# Patient Record
Sex: FEMALE | Race: WHITE | NOT HISPANIC OR LATINO | Employment: OTHER | URBAN - METROPOLITAN AREA
[De-identification: names, ages, dates, MRNs, and addresses within clinical notes are randomized per-mention and may not be internally consistent; named-entity substitution may affect disease eponyms.]

---

## 2017-02-03 ENCOUNTER — CLINICAL SUPPORT (OUTPATIENT)
Dept: ORTHOPEDIC SURGERY | Facility: CLINIC | Age: 72
End: 2017-02-03

## 2017-02-03 DIAGNOSIS — M25.561 ACUTE PAIN OF RIGHT KNEE: Primary | ICD-10-CM

## 2017-02-03 PROCEDURE — 20610 DRAIN/INJ JOINT/BURSA W/O US: CPT | Performed by: NURSE PRACTITIONER

## 2017-02-03 RX ORDER — BUPIVACAINE HYDROCHLORIDE 5 MG/ML
2 INJECTION, SOLUTION PERINEURAL
Status: COMPLETED | OUTPATIENT
Start: 2017-02-03 | End: 2017-02-03

## 2017-02-03 RX ORDER — METHYLPREDNISOLONE ACETATE 80 MG/ML
80 INJECTION, SUSPENSION INTRA-ARTICULAR; INTRALESIONAL; INTRAMUSCULAR; SOFT TISSUE
Status: COMPLETED | OUTPATIENT
Start: 2017-02-03 | End: 2017-02-03

## 2017-02-03 RX ORDER — LIDOCAINE HYDROCHLORIDE 10 MG/ML
4 INJECTION, SOLUTION INFILTRATION; PERINEURAL
Status: COMPLETED | OUTPATIENT
Start: 2017-02-03 | End: 2017-02-03

## 2017-02-03 RX ADMIN — METHYLPREDNISOLONE ACETATE 80 MG: 80 INJECTION, SUSPENSION INTRA-ARTICULAR; INTRALESIONAL; INTRAMUSCULAR; SOFT TISSUE at 15:34

## 2017-02-03 RX ADMIN — BUPIVACAINE HYDROCHLORIDE 2 ML: 5 INJECTION, SOLUTION PERINEURAL at 15:34

## 2017-02-03 RX ADMIN — LIDOCAINE HYDROCHLORIDE 4 ML: 10 INJECTION, SOLUTION INFILTRATION; PERINEURAL at 15:34

## 2017-02-03 NOTE — PROGRESS NOTES
2/3/2017    Lizzie Bose is here today for worsening knee pain. Pt has undergone injection of the knee in the past with good resolution of symptoms. Pt is requesting a repeat injection.     KNEE Injection Procedure Note:    Large Joint Arthrocentesis  Date/Time: 2/3/2017 3:34 PM  Consent given by: patient  Site marked: site marked  Timeout: Immediately prior to procedure a time out was called to verify the correct patient, procedure, equipment, support staff and site/side marked as required   Supporting Documentation  Indications: pain and joint swelling   Procedure Details  Location: knee - R knee  Preparation: Patient was prepped and draped in the usual sterile fashion  Needle size: 18 G  Approach: anterolateral  Medications administered: 4 mL lidocaine 1 %; 80 mg methylPREDNISolone acetate 80 MG/ML; 2 mL bupivacaine            At the conclusion of the injection I discussed the importance of continued quad strengthening exercises on a daily basis. I will see the patient back if the symptoms should fail to improve or worsen.    Karina Solo, APRN  2/3/2017

## 2017-03-20 ENCOUNTER — OFFICE VISIT (OUTPATIENT)
Dept: PSYCHIATRY | Facility: HOSPITAL | Age: 72
End: 2017-03-20

## 2017-03-20 DIAGNOSIS — F10.21 ALCOHOL DEPENDENCE IN REMISSION (HCC): ICD-10-CM

## 2017-03-20 DIAGNOSIS — F41.1 GENERALIZED ANXIETY DISORDER: Primary | ICD-10-CM

## 2017-03-20 PROCEDURE — 90791 PSYCH DIAGNOSTIC EVALUATION: CPT | Performed by: SOCIAL WORKER

## 2017-03-20 NOTE — PROGRESS NOTES
1:20pm-2:15pm  Initial note:  Client was referred from the [facility] where she has been seeing Modesta Pickard for 7 years.  Modesta will no longer be seeing clients so she referred her here.  She came to the [facility] after realizing that her drinking was out of control.  She has been sober for seven years, has a sponsor, but no longer goes to .  She has been retired for approximately 6 years but states that she has been too busy for meetings-always has other things to do.  Recommended that she consider a weekly meeting.  Client is  with 2 sons, 33 and 30.  She miscarried between the boys.  Always wanted a daughter.  Will see Modesta again later in the week and discuss plan for ongoing therapy.

## 2017-04-06 ENCOUNTER — OFFICE VISIT (OUTPATIENT)
Dept: PSYCHIATRY | Facility: HOSPITAL | Age: 72
End: 2017-04-06

## 2017-04-06 DIAGNOSIS — F10.21 ALCOHOL DEPENDENCE IN REMISSION (HCC): ICD-10-CM

## 2017-04-06 DIAGNOSIS — F41.1 GENERALIZED ANXIETY DISORDER: Primary | ICD-10-CM

## 2017-04-06 PROCEDURE — 90834 PSYTX W PT 45 MINUTES: CPT | Performed by: SOCIAL WORKER

## 2017-04-06 NOTE — PROGRESS NOTES
"1:00pm-1:50pm  Client states she got lost again coming to the office.  She was very disorganized and anxious.  Toone that she has an appointment with the referring therapist when she leaves here.  She is angry that she was not given more time to process their termination.  She also feels \"cast aside\" that maybe Modesta did not care about her as much as she thought she did.  Thoughts very chaotic.  Not sure she wants to enter this relationship given the circumstances but did make another appointment.  "

## 2017-04-20 ENCOUNTER — OFFICE VISIT (OUTPATIENT)
Dept: PSYCHIATRY | Facility: HOSPITAL | Age: 72
End: 2017-04-20

## 2017-04-20 DIAGNOSIS — F41.1 GENERALIZED ANXIETY DISORDER: Primary | ICD-10-CM

## 2017-04-20 DIAGNOSIS — F10.21 ALCOHOL DEPENDENCE IN REMISSION (HCC): ICD-10-CM

## 2017-04-20 PROCEDURE — 90834 PSYTX W PT 45 MINUTES: CPT | Performed by: SOCIAL WORKER

## 2017-04-20 NOTE — PROGRESS NOTES
1:05pm-1:55pm  Client still struggling with the loss of her therapist but will continue to see her for another month.  While on the trip out to see her son she noticed problems with her -cognitive impairment that scared her.  He had surgery in January, did have problems post anesthesia but she did not expect this.  He was better by the time they left.  Client feels pressure to do more than her energy level allows and is not giving self a break to do some of the things she would like to do-like spending time in the garden.

## 2017-05-04 ENCOUNTER — OFFICE VISIT (OUTPATIENT)
Dept: PSYCHIATRY | Facility: HOSPITAL | Age: 72
End: 2017-05-04

## 2017-05-04 DIAGNOSIS — F10.21 ALCOHOL DEPENDENCE IN REMISSION (HCC): ICD-10-CM

## 2017-05-04 DIAGNOSIS — F41.1 GENERALIZED ANXIETY DISORDER: Primary | ICD-10-CM

## 2017-05-04 PROCEDURE — 90834 PSYTX W PT 45 MINUTES: CPT | Performed by: SOCIAL WORKER

## 2017-05-18 ENCOUNTER — OFFICE VISIT (OUTPATIENT)
Dept: PSYCHIATRY | Facility: HOSPITAL | Age: 72
End: 2017-05-18

## 2017-05-18 DIAGNOSIS — F10.21 ALCOHOL DEPENDENCE IN REMISSION (HCC): ICD-10-CM

## 2017-05-18 DIAGNOSIS — F41.1 GENERALIZED ANXIETY DISORDER: Primary | ICD-10-CM

## 2017-05-18 PROCEDURE — 90834 PSYTX W PT 45 MINUTES: CPT | Performed by: SOCIAL WORKER

## 2017-06-02 ENCOUNTER — APPOINTMENT (OUTPATIENT)
Dept: PSYCHIATRY | Facility: HOSPITAL | Age: 72
End: 2017-06-02

## 2017-06-09 ENCOUNTER — OFFICE VISIT (OUTPATIENT)
Dept: PSYCHIATRY | Facility: HOSPITAL | Age: 72
End: 2017-06-09

## 2017-06-09 DIAGNOSIS — F10.21 ALCOHOL DEPENDENCE IN REMISSION (HCC): ICD-10-CM

## 2017-06-09 DIAGNOSIS — F41.1 GENERALIZED ANXIETY DISORDER: Primary | ICD-10-CM

## 2017-06-09 PROCEDURE — 90834 PSYTX W PT 45 MINUTES: CPT | Performed by: SOCIAL WORKER

## 2017-06-09 NOTE — PROGRESS NOTES
1:00pm-1:50pm  Client has continued to see her therapist at the [facility] but will wrap up in July.  She has had two sessions with her  and that has been helpful.  The next session will be individual.  Encouraged her to use that time to process her grief.

## 2020-01-16 ENCOUNTER — OFFICE VISIT (OUTPATIENT)
Dept: ORTHOPEDIC SURGERY | Facility: CLINIC | Age: 75
End: 2020-01-16

## 2020-01-16 VITALS — TEMPERATURE: 98.9 F | WEIGHT: 149.8 LBS | BODY MASS INDEX: 24.96 KG/M2 | HEIGHT: 65 IN

## 2020-01-16 DIAGNOSIS — M25.562 CHRONIC PAIN OF BOTH KNEES: Primary | ICD-10-CM

## 2020-01-16 DIAGNOSIS — G89.29 CHRONIC PAIN OF BOTH KNEES: Primary | ICD-10-CM

## 2020-01-16 DIAGNOSIS — M25.561 CHRONIC PAIN OF BOTH KNEES: Primary | ICD-10-CM

## 2020-01-16 PROBLEM — M54.50 LUMBAR PAIN: Status: ACTIVE | Noted: 2019-03-21

## 2020-01-16 PROCEDURE — 99214 OFFICE O/P EST MOD 30 MIN: CPT | Performed by: ORTHOPAEDIC SURGERY

## 2020-01-16 PROCEDURE — 73562 X-RAY EXAM OF KNEE 3: CPT | Performed by: ORTHOPAEDIC SURGERY

## 2020-01-16 RX ORDER — MELATONIN
1000 DAILY
COMMUNITY

## 2020-01-16 NOTE — PROGRESS NOTES
"Patient: Lizzie Bose  YOB: 1945 74 y.o. female  Medical Record Number: [id number]    Chief Complaint:   Chief Complaint   Patient presents with   • Left Knee - Establish Care   • Right Knee - Establish Care       History of Present Illness:Lizzie Bose is a 74 y.o. female who presents for follow-up of  RIGHT > LEFT KNEE PAIN -  SEVERE MEDIAL STABBING WORSE WITH wb -  LIMTS ADLS AND QUALITY OF LIFE - LAST INNJECTION NOT HELPFUL. hAS MODERATE ACHING OF MIDLINE LUMBAR.    Allergies:   Allergies   Allergen Reactions   • Atorvastatin        Medications:   Current Outpatient Medications   Medication Sig Dispense Refill   • amLODIPine-valsartan (EXFORGE) 5-160 MG per tablet Take 1 tablet by mouth.     • amphetamine-dextroamphetamine (ADDERALL) 10 MG tablet TAKE 1 TABLET BY MOUTH EVERY DAY IN THE AFTERNOON  0   • CALCIUM CARBONATE-VITAMIN D PO Take  by mouth.     • cholecalciferol (VITAMIN D3) 25 MCG (1000 UT) tablet Take 1,000 Units by mouth Daily.     • ezetimibe (ZETIA) 10 MG tablet Take  by mouth.     • metoprolol succinate XL (TOPROL-XL) 100 MG 24 hr tablet Take 100 mg by mouth.     • MULTIPLE VITAMIN PO Take  by mouth.     • PRISTIQ 50 MG 24 hr tablet      • OMEGA-3 FATTY ACIDS PO Take  by mouth.     • VYVANSE 40 MG capsule TAKE ONE CAPSULE BY MOUTH EVERY DAY IN THE MORNING  0     No current facility-administered medications for this visit.          The following portions of the patient's history were reviewed and updated as appropriate: allergies, current medications, past family history, past medical history, past social history, past surgical history and problem list.    Review of Systems:   A 14 point review of systems was performed. All systems negative except pertinent positives/negative listed in HPI above    Physical Exam:   Vitals:    01/16/20 1502   Temp: 98.9 °F (37.2 °C)   TempSrc: Temporal   Weight: 67.9 kg (149 lb 12.8 oz)   Height: 165.1 cm (65\")   PainSc:   7   PainLoc: Knee "       General: A and O x 3, ASA, NAD    SCLERA:    Normal    DENTITION:   Normal  Knee:  bilateral    ALIGNMENT:     Varus  ,   Patella  tracks  midline    GAIT:    Antalgic    SKIN:    No abnormality    RANGE OF MOTION:   4  -  110   DEG    STRENGTH:   4  / 5    LIGAMENTS:    No varus / valgus instability.   Negative  Lachman.    MENISCUS:     Negative   Jani       DISTAL PULSES:    Paplable    DISTAL SENSATION :   Intact    LYMPHATICS:     No   lymphadenopathy    OTHER:          - Positive   effusion      - Crepitance with ROM       Radiology:   Xrays: 3 views bilateral knee (AP, lateral, and sunrise) were ordered and reviewed for evaluation of knee pain demonstrating - advanced OA with bone on bone articulation, subchondral cysts, and periarticular osteophytes. In comparison to previous films there has been progression of the arthritic process  Comparison views: todays xrays were compared to previous xrays and show new findings as described above    Assessment/Plan:  r > l KNEE PAIN - SEVERE END STAGE oa.   Continuation of conservative management vs. TKA discussed.  The patient wishes to proceed with total knee replacement.  At this point the patient has failed the full compliment of conservative treatment and stating complete understanding of the risks/benefits/ anternatives wishes to proceed with surgical treatment.    Risk and benefits of surgery were reviewed.  Including, but not limited to, blood clots or pulmonary embolism, anesthesia risk, infection, fracture, skin/leg numbness, persistent pain/crepitance/popping/catching, failure of the implant, need for future surgeries, hematoma, possible nerve or blood vessel injury, need for transfusion, and potential risk of stroke,heart attack or death, among others.  The patient understands and wishes to proceed.     It was explained that if tissue has been repaired or reconstructed, there is also an increased chance of failure which may require further  management.  Following the completion of the discussion, the patient expressed understanding of this planned course of care, all their questions were answered and consent will be obtained preoperatively.    Operative Plan: RIGHT Smith and Nephjuan Oxinium Total Knee Replacement an overnight staywith home health rehab.  She has some back issues as well has seen Dr. Mandel who has previously replaced her hip.  He feels that she has sciatica and I think her symptoms are consistent with that.  I recommended that she see physical therapy for abdominal core strengthening and also work on her legs as well.  She has some issues to work through with her  and decisions to make when she is ready the next step will be for her to call us back and we can get her set up for right total knee replacement.      Krish Ramirez MD  1/16/2020   No

## 2020-01-27 ENCOUNTER — TELEPHONE (OUTPATIENT)
Dept: ORTHOPEDIC SURGERY | Facility: CLINIC | Age: 75
End: 2020-01-27

## 2020-02-10 ENCOUNTER — TELEPHONE (OUTPATIENT)
Dept: ORTHOPEDIC SURGERY | Facility: CLINIC | Age: 75
End: 2020-02-10

## 2020-02-11 DIAGNOSIS — M17.11 PRIMARY OSTEOARTHRITIS OF RIGHT KNEE: Primary | ICD-10-CM

## 2020-02-11 PROBLEM — M17.9 OA (OSTEOARTHRITIS) OF KNEE: Status: ACTIVE | Noted: 2020-02-11

## 2020-02-11 RX ORDER — MELOXICAM 15 MG/1
15 TABLET ORAL ONCE
Status: CANCELLED | OUTPATIENT
Start: 2020-05-06 | End: 2020-02-11

## 2020-02-11 RX ORDER — VANCOMYCIN HYDROCHLORIDE 1 G/200ML
15 INJECTION, SOLUTION INTRAVENOUS ONCE
Status: CANCELLED | OUTPATIENT
Start: 2020-05-06 | End: 2020-02-11

## 2020-02-11 RX ORDER — CEFAZOLIN SODIUM 2 G/100ML
2 INJECTION, SOLUTION INTRAVENOUS ONCE
Status: CANCELLED | OUTPATIENT
Start: 2020-05-06 | End: 2020-02-11

## 2020-02-11 RX ORDER — PREGABALIN 75 MG/1
150 CAPSULE ORAL ONCE
Status: CANCELLED | OUTPATIENT
Start: 2020-05-06 | End: 2020-02-11

## 2020-02-19 ENCOUNTER — CONSULT (OUTPATIENT)
Dept: ORTHOPEDIC SURGERY | Facility: CLINIC | Age: 75
End: 2020-02-19

## 2020-02-19 VITALS — WEIGHT: 145 LBS | BODY MASS INDEX: 24.16 KG/M2 | HEIGHT: 65 IN | TEMPERATURE: 97.2 F

## 2020-02-19 DIAGNOSIS — M54.50 LOW BACK PAIN, UNSPECIFIED BACK PAIN LATERALITY, UNSPECIFIED CHRONICITY, UNSPECIFIED WHETHER SCIATICA PRESENT: Primary | ICD-10-CM

## 2020-02-19 PROCEDURE — 72100 X-RAY EXAM L-S SPINE 2/3 VWS: CPT | Performed by: ORTHOPAEDIC SURGERY

## 2020-02-19 PROCEDURE — 99214 OFFICE O/P EST MOD 30 MIN: CPT | Performed by: ORTHOPAEDIC SURGERY

## 2020-02-19 RX ORDER — ZOLPIDEM TARTRATE 10 MG/1
10 TABLET ORAL NIGHTLY PRN
COMMUNITY
Start: 2020-01-23 | End: 2020-09-03 | Stop reason: SDUPTHER

## 2020-02-19 RX ORDER — MELOXICAM 15 MG/1
15 TABLET ORAL DAILY
COMMUNITY
Start: 2020-02-09

## 2020-02-19 NOTE — PROGRESS NOTES
New patient or new problem visit    Chief Complaint   Patient presents with   • Lumbar Spine - Pain       HPI: Low back pain left buttock pain, actually bilateral buttock pain worse with activity moderate to severe intermittent stabbing and aching.    PFSH: See chart- reviewed    Review of Systems   Constitutional: Negative for chills, fever and unexpected weight change.   HENT: Negative for trouble swallowing and voice change.    Eyes: Negative for visual disturbance.   Respiratory: Positive for apnea. Negative for cough and shortness of breath.    Cardiovascular: Negative for chest pain and leg swelling.   Gastrointestinal: Negative for abdominal pain, nausea and vomiting.   Endocrine: Negative for cold intolerance and heat intolerance.   Genitourinary: Negative for difficulty urinating, frequency and urgency.   Musculoskeletal: Positive for arthralgias and back pain.   Skin: Negative for rash and wound.   Allergic/Immunologic: Negative for immunocompromised state.   Neurological: Negative for weakness and numbness.   Hematological: Does not bruise/bleed easily.   Psychiatric/Behavioral: Negative for dysphoric mood. The patient is not nervous/anxious.        PE: Constitutional: Vital signs above-noted.  Awake, alert and oriented    Psychiatric: Affect and insight do not appear grossly disturbed.    Pulmonary: Breathing is unlabored, color is good.    Skin: Warm, dry and normal turgor    Cardiac: Pedal pulses intact.  No edema.    Eyesight and hearing appear adequate for examination purposes      Musculoskeletal:  There is no tenderness to percussion and palpation of the spine. Motion appears undisturbed.  Posture is unremarkable to coronal and sagittal inspection.    The skin about the area is intact.  There is no palpable or visible deformity.  There is no local spasm.       Neurologic:   Reflexes are 2+ and symmetrical in the patellae and achilles.   Motor function is undisturbed in quadriceps, EHL, and  gastrocnemius   sensation appears symmetrically intact to light touch.  In the bilateral lower extremities there is no evidence of atrophy.   Clonus is absent..  Gait appears undisturbed.  SLR test negative      MEDICAL DECISION MAKING    XRAY: Plain film x-rays of the lumbar spine show multilevel spondylosis and some L4-5 spondylolisthesis of mild extent.    Other: n/a    Impression: Lumbar spondylolisthesis lumbar spinal stenosis.  She does not really want to do therapy.    Plan: MRI scan perhaps with an eye toward epidural injection.  I will call her with results and recommendations.

## 2020-02-24 PROBLEM — M17.11 PRIMARY OSTEOARTHRITIS OF RIGHT KNEE: Status: ACTIVE | Noted: 2020-02-24

## 2020-02-28 ENCOUNTER — APPOINTMENT (OUTPATIENT)
Dept: MRI IMAGING | Facility: HOSPITAL | Age: 75
End: 2020-02-28

## 2020-03-08 ENCOUNTER — APPOINTMENT (OUTPATIENT)
Dept: MRI IMAGING | Facility: HOSPITAL | Age: 75
End: 2020-03-08

## 2020-03-12 ENCOUNTER — HOSPITAL ENCOUNTER (OUTPATIENT)
Dept: MRI IMAGING | Facility: HOSPITAL | Age: 75
Discharge: HOME | End: 2020-03-12
Admitting: ORTHOPAEDIC SURGERY

## 2020-03-12 DIAGNOSIS — M54.50 LOW BACK PAIN, UNSPECIFIED BACK PAIN LATERALITY, UNSPECIFIED CHRONICITY, UNSPECIFIED WHETHER SCIATICA PRESENT: ICD-10-CM

## 2020-03-12 PROCEDURE — 72148 MRI LUMBAR SPINE W/O DYE: CPT

## 2020-03-20 ENCOUNTER — TELEPHONE (OUTPATIENT)
Dept: ORTHOPEDIC SURGERY | Facility: CLINIC | Age: 75
End: 2020-03-20

## 2020-03-20 DIAGNOSIS — M54.50 LOW BACK PAIN, UNSPECIFIED BACK PAIN LATERALITY, UNSPECIFIED CHRONICITY, UNSPECIFIED WHETHER SCIATICA PRESENT: Primary | ICD-10-CM

## 2020-03-20 NOTE — TELEPHONE ENCOUNTER
----- Message from aMrio Silverio MD sent at 3/19/2020 11:34 AM EDT -----  Tell her that the lumbar MRI demonstrated widespread wear-and-tear change but very little in the way of pinched nerves except at the L4-5 level.  I recommend lumbar epidural injections, or simply living with this.  Surgery may someday be an option but definitely not at the present time.

## 2020-03-25 NOTE — TELEPHONE ENCOUNTER
Patient informed of results.  Epidurals have been ordered, however, she is aware they are currently not being performed but that someone from the hospital will call her.  She also mentioned that she is scheduled for have a TKR with [facility] in May.  I have instructed her to call when she finds out about her epidurals to make sure it's not too close to her surgery date.  I have mailed her a copy of the MRI report.

## 2020-04-23 ENCOUNTER — APPOINTMENT (OUTPATIENT)
Dept: PREADMISSION TESTING | Facility: HOSPITAL | Age: 75
End: 2020-04-23

## 2020-04-24 ENCOUNTER — APPOINTMENT (OUTPATIENT)
Dept: PREADMISSION TESTING | Facility: HOSPITAL | Age: 75
End: 2020-04-24

## 2020-04-29 ENCOUNTER — APPOINTMENT (OUTPATIENT)
Dept: PREADMISSION TESTING | Facility: HOSPITAL | Age: 75
End: 2020-04-29

## 2020-05-13 ENCOUNTER — PREP FOR SURGERY (OUTPATIENT)
Dept: OTHER | Facility: HOSPITAL | Age: 75
End: 2020-05-13

## 2020-05-29 ENCOUNTER — ANESTHESIA EVENT (OUTPATIENT)
Dept: PAIN MEDICINE | Facility: HOSPITAL | Age: 75
End: 2020-05-29

## 2020-05-29 ENCOUNTER — HOSPITAL ENCOUNTER (OUTPATIENT)
Dept: GENERAL RADIOLOGY | Facility: HOSPITAL | Age: 75
Discharge: HOME | End: 2020-05-29

## 2020-05-29 ENCOUNTER — HOSPITAL ENCOUNTER (OUTPATIENT)
Dept: PAIN MEDICINE | Facility: HOSPITAL | Age: 75
Discharge: HOME | End: 2020-05-29
Admitting: ANESTHESIOLOGY

## 2020-05-29 ENCOUNTER — ANESTHESIA (OUTPATIENT)
Dept: PAIN MEDICINE | Facility: HOSPITAL | Age: 75
End: 2020-05-29

## 2020-05-29 VITALS
HEIGHT: 65 IN | HEART RATE: 72 BPM | SYSTOLIC BLOOD PRESSURE: 139 MMHG | RESPIRATION RATE: 14 BRPM | OXYGEN SATURATION: 96 % | BODY MASS INDEX: 23.32 KG/M2 | TEMPERATURE: 98 F | DIASTOLIC BLOOD PRESSURE: 73 MMHG | WEIGHT: 140 LBS

## 2020-05-29 DIAGNOSIS — M54.50 LOW BACK PAIN, UNSPECIFIED BACK PAIN LATERALITY, UNSPECIFIED CHRONICITY, UNSPECIFIED WHETHER SCIATICA PRESENT: ICD-10-CM

## 2020-05-29 DIAGNOSIS — M54.50 LUMBAR PAIN: Primary | ICD-10-CM

## 2020-05-29 DIAGNOSIS — R52 PAIN: ICD-10-CM

## 2020-05-29 PROCEDURE — C1755 CATHETER, INTRASPINAL: HCPCS

## 2020-05-29 PROCEDURE — 25010000002 METHYLPREDNISOLONE PER 80 MG: Performed by: ANESTHESIOLOGY

## 2020-05-29 PROCEDURE — 77003 FLUOROGUIDE FOR SPINE INJECT: CPT

## 2020-05-29 PROCEDURE — 0 IOPAMIDOL 41 % SOLUTION: Performed by: ANESTHESIOLOGY

## 2020-05-29 RX ORDER — LIDOCAINE HYDROCHLORIDE 10 MG/ML
1 INJECTION, SOLUTION INFILTRATION; PERINEURAL ONCE AS NEEDED
Status: DISCONTINUED | OUTPATIENT
Start: 2020-05-29 | End: 2020-05-30 | Stop reason: HOSPADM

## 2020-05-29 RX ORDER — FENTANYL CITRATE 50 UG/ML
50 INJECTION, SOLUTION INTRAMUSCULAR; INTRAVENOUS AS NEEDED
Status: DISCONTINUED | OUTPATIENT
Start: 2020-05-29 | End: 2020-05-30 | Stop reason: HOSPADM

## 2020-05-29 RX ORDER — MIDAZOLAM HYDROCHLORIDE 1 MG/ML
1 INJECTION INTRAMUSCULAR; INTRAVENOUS AS NEEDED
Status: DISCONTINUED | OUTPATIENT
Start: 2020-05-29 | End: 2020-05-30 | Stop reason: HOSPADM

## 2020-05-29 RX ORDER — SODIUM CHLORIDE 0.9 % (FLUSH) 0.9 %
1-10 SYRINGE (ML) INJECTION AS NEEDED
Status: DISCONTINUED | OUTPATIENT
Start: 2020-05-29 | End: 2020-05-30 | Stop reason: HOSPADM

## 2020-05-29 RX ORDER — METHYLPREDNISOLONE ACETATE 80 MG/ML
80 INJECTION, SUSPENSION INTRA-ARTICULAR; INTRALESIONAL; INTRAMUSCULAR; SOFT TISSUE ONCE
Status: COMPLETED | OUTPATIENT
Start: 2020-05-29 | End: 2020-05-29

## 2020-05-29 RX ADMIN — IOPAMIDOL 10 ML: 408 INJECTION, SOLUTION INTRATHECAL at 12:04

## 2020-05-29 RX ADMIN — METHYLPREDNISOLONE ACETATE 80 MG: 80 INJECTION, SUSPENSION INTRA-ARTICULAR; INTRALESIONAL; INTRAMUSCULAR; SOFT TISSUE at 12:04

## 2020-05-29 NOTE — ANESTHESIA PROCEDURE NOTES
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)     * Lumbago (M54.5)     * Degeneration of lumbar intervertebral disc (M51.36)    Prep:  Pt Position:prone  Sterile Tech:sterile barrier, mask and gloves  Prep:chlorhexidine gluconate and isopropyl alcohol  Monitoring:blood pressure monitoring, continuous pulse oximetry and EKG  Procedure:Sedation: no     Approach:midline  Guidance: fluoroscopy  Location:lumbar  Level:L5-S1  Needle Type:Tuohy  Needle Gauge:20 G  Aspiration:negative  Test Dose:negative  Medications:  Depomedrol:80 mg  Preservative Free Saline:2mL  Isovue:2mL    Post Assessment:  Pt Tolerance:patient tolerated the procedure well with no apparent complications  Complications:no

## 2020-05-29 NOTE — H&P
[facility]    History and Physical    Patient Name: Lizzie Bose  :  1945  MRN:  [id number]  Date of Admission: 2020    Subjective     Patient is a 75 y.o. female presents with chief complaint of chronic low back, hips: bilateral and buttock pain.  Onset of symptoms was gradual starting several months ago.  Symptoms are associated/aggravated by nothing in particular. Symptoms improve with nothing    The following portions of the patients history were reviewed and updated as appropriate: current medications, allergies, past medical history, past surgical history, past family history, past social history and problem list    MRI of the spine shows multiple levels of degeneration            Objective     Past Medical History:   Past Medical History:   Diagnosis Date   • High blood pressure    • Hyperlipidemia    • Low back pain      Past Surgical History:   Past Surgical History:   Procedure Laterality Date   • APPENDECTOMY     • CATARACT EXTRACTION Bilateral    • HIP SURGERY Left     total hip replacement   • JOINT REPLACEMENT      tlkr     Family History:   Family History   Problem Relation Age of Onset   • Diabetes Brother    • Hypertension Brother    • Clotting disorder Son      Social History:   Social History     Tobacco Use   • Smoking status: Never Smoker   • Smokeless tobacco: Never Used   Substance Use Topics   • Alcohol use: No   • Drug use: Never       Vital Signs Range for the last 24 hours  Temperature:     Temp Source:     BP:     Pulse:     Respirations:     SPO2:     O2 Amount (l/min):     O2 Devices     Weight:           --------------------------------------------------------------------------------    Current Outpatient Medications   Medication Sig Dispense Refill   • amLODIPine-valsartan (EXFORGE) 5-160 MG per tablet Take 1 tablet by mouth.     • amphetamine-dextroamphetamine (ADDERALL) 10 MG tablet TAKE 1 TABLET BY MOUTH EVERY DAY IN THE AFTERNOON  0   • CALCIUM CARBONATE-VITAMIN D  PO Take  by mouth.     • cholecalciferol (VITAMIN D3) 25 MCG (1000 UT) tablet Take 1,000 Units by mouth Daily.     • ezetimibe (ZETIA) 10 MG tablet Take  by mouth.     • meloxicam (MOBIC) 15 MG tablet Take 15 mg by mouth Daily. PRN     • metoprolol succinate XL (TOPROL-XL) 100 MG 24 hr tablet Take 100 mg by mouth.     • MULTIPLE VITAMIN PO Take  by mouth.     • OMEGA-3 FATTY ACIDS PO Take  by mouth.     • PRISTIQ 50 MG 24 hr tablet      • VYVANSE 40 MG capsule TAKE ONE CAPSULE BY MOUTH EVERY DAY IN THE MORNING  0   • zolpidem (AMBIEN) 10 MG tablet        Current Facility-Administered Medications   Medication Dose Route Frequency Provider Last Rate Last Dose   • fentaNYL citrate (PF) (SUBLIMAZE) injection 50 mcg  50 mcg Intravenous PRN Render, Emmett Donis MD       • iopamidol (ISOVUE-M 200) injection 41%  12 mL Epidural Once in imaging Render, Emmett Donis MD       • lidocaine (XYLOCAINE) 1 % injection 1 mL  1 mL Intradermal Once PRN RenderEmmett MD       • methylPREDNISolone acetate (DEPO-medrol) injection 80 mg  80 mg Intra-articular Once Render, Emmett Donis MD       • midazolam (VERSED) injection 1 mg  1 mg Intravenous PRN Render, Emmett Donis MD       • sodium chloride 0.9 % flush 1-10 mL  1-10 mL Intravenous PRN Emmett Muse MD           --------------------------------------------------------------------------------  Assessment/Plan      Anesthesia Evaluation     NPO Solid Status: N/A             Airway   Mallampati: II  TM distance: >3 FB  Neck ROM: full  No difficulty expected  Dental - normal exam     Pulmonary - normal exam   (+) sleep apnea,   (-) not a smoker  Cardiovascular     Rhythm: regular    (+) hypertension, hyperlipidemia,     PE comment: Dorsal pedal pulses are palpable    Neuro/Psych  (-) left straight leg raise test, right straight leg raise test  GI/Hepatic/Renal/Endo      Musculoskeletal         PE comment: Hx of DIONICIO and planned TKA- some arthritic pain but otherwise good ROM  Abdominal     Substance History      OB/GYN          Other                   Diagnosis and Plan    Treatment Plan  ASA 3      Procedures: Lumbar Epidural Steroid Injection(LESI), With fluoroscopy,           Plan lumbar epidural steroid injection under fluoroscopic guidance.  Patient understands the risks and benefits including but not limited to bleeding, infection, dural puncture headache, inadvertent spinal anesthetic and allergic adverse reaction to local anesthetic or steroid.  She is also aware that long-term steroid administration can potentially lead to increased risk of lumbar compression fractures or avascular necrosis of the hips.    Diagnosis     * Lumbar neuritis [M54.16]     * Lumbago [M54.5]     * Degeneration of lumbar intervertebral disc [M51.36]

## 2020-07-01 NOTE — PROGRESS NOTES
Pre Op Ortho Assessment    [facility]     Patient Name: Lizzie Bose  MRN: [id number]  Today's Date: 7/1/2020        PRE-OPERATIVE ORTHOPEDIC ASSESSMENT     Row Name 07/01/20 1543       Question    What is your age group?  0    Gender?  1    How far on average can you walk? (a block is 200 meters)  1    Which gait aid do you use? (more often than not)  2    Do you use community supports: (home help, meals on wheels, district nursing)  1    Will you live with someone who can care for you after your operation?  0    Your Score (out of 12)  5       Discharge Disposition/Planning:    Discussed the predicted discharge disposition needed based on RAPT Assessment with the patient  Yes    Patient [facility]  HER HOME HAS NO STEPS INTO OR INSIDE    Outpatient Rehabilitation Facility of Choice:  other *see comment NO CHOICE YET    Home Health Services Preferred:  Other *see comment NO CHOICE YET    Post-operative Caregiver Name and Phone Number  Friend FRIENDS CAN HELP ONCE DC'D FROM REHAB .  HER HUSBANDHAS BEEN ILL AND IS IN [facility].        Subacute Inpatient Rehabilitation: Complete this section only if planning inpatient services at a sub-acute facility.     Subacute Facility Preferred  -- [facility]    Requires pre-certification for inpatient rehabilitation services?  Yes    Planned source of transportation to inpatient rehabilitation facility?  Other *see comment POSSIBLY A FRIEND.  SHE IS WORKING ON THAT    If choosing inpatient services at an Acute or Subacute Facility please list a subsequent back-up plan (in case the patient fails to qualify for inpatient rehabilitation).  Other *see comments NO BACK UP.   IS ILL AND SON LIVES OUT [facility].        Home Equipment    Does patient have a walker for home use?  Yes    Does patient have a 3 in 1 commode for home use?  Yes    Does patient have a shower chair for home use?  No    Does patient have an  elevated commode seat for home use?  No    Does patient have a cane for home use?  Yes    Is there any other medical equipment in the home?  No       Pre-Operative Class Attendance    Attended or scheduled to attend the pre-operative class within 1 year of total joint replacement?  Yes       Patient Education    Expected time of discharge discussed  Yes    Encouraged to attend pre-operative class  Yes    Education regarding predicted discharge disposition based on RAPT score  Yes    Patient receptive and voiced understanding of information given  Yes            Devon Palencia LPN

## 2020-07-09 ENCOUNTER — OFFICE VISIT (OUTPATIENT)
Dept: ORTHOPEDIC SURGERY | Facility: CLINIC | Age: 75
End: 2020-07-09

## 2020-07-09 VITALS
HEIGHT: 65 IN | DIASTOLIC BLOOD PRESSURE: 84 MMHG | WEIGHT: 146 LBS | TEMPERATURE: 98.7 F | BODY MASS INDEX: 24.32 KG/M2 | SYSTOLIC BLOOD PRESSURE: 132 MMHG

## 2020-07-09 DIAGNOSIS — M17.11 PRIMARY OSTEOARTHRITIS OF RIGHT KNEE: Primary | ICD-10-CM

## 2020-07-09 PROCEDURE — 77077 JOINT SURVEY SINGLE VIEW: CPT | Performed by: ORTHOPAEDIC SURGERY

## 2020-07-09 PROCEDURE — 73562 X-RAY EXAM OF KNEE 3: CPT | Performed by: NURSE PRACTITIONER

## 2020-07-09 PROCEDURE — S0260 H&P FOR SURGERY: HCPCS | Performed by: NURSE PRACTITIONER

## 2020-07-09 NOTE — H&P
Patient: Lizzie Bose    Date of Admission: 7/13/2020    YOB: 1945    Medical Record Number: [id number]    Admitting Physician: Dr. Krish Ramirez    Reason for Admission: End Stage Right Knee OA    History of Present Illness: 75 y.o. female presents with severe end stage knee osteoarthritis which has not been responsive to the full compliment of conservative measures. Despite conservative attempts, there is still severe, constant activity limiting pain. Given the severity of the pain, the patient has elected to proceed with knee replacement.    Allergies:   Allergies   Allergen Reactions   • Atorvastatin          Current Medications:  Home Medications:    Current Outpatient Medications on File Prior to Visit   Medication Sig   • amLODIPine-valsartan (EXFORGE) 5-160 MG per tablet Take 1 tablet by mouth.   • amphetamine-dextroamphetamine (ADDERALL) 10 MG tablet TAKE 1 TABLET BY MOUTH EVERY DAY IN THE AFTERNOON   • CALCIUM CARBONATE-VITAMIN D PO Take  by mouth.   • cholecalciferol (VITAMIN D3) 25 MCG (1000 UT) tablet Take 1,000 Units by mouth Daily.   • ezetimibe (ZETIA) 10 MG tablet Take  by mouth.   • meloxicam (MOBIC) 15 MG tablet Take 15 mg by mouth Daily. PRN   • metoprolol succinate XL (TOPROL-XL) 100 MG 24 hr tablet Take 100 mg by mouth.   • MULTIPLE VITAMIN PO Take  by mouth.   • OMEGA-3 FATTY ACIDS PO Take  by mouth.   • PRISTIQ 50 MG 24 hr tablet    • VYVANSE 40 MG capsule TAKE ONE CAPSULE BY MOUTH EVERY DAY IN THE MORNING   • zolpidem (AMBIEN) 10 MG tablet      No current facility-administered medications on file prior to visit.      PRN Meds:.    PMH:     Past Medical History:   Diagnosis Date   • High blood pressure    • Hyperlipidemia    • Low back pain        PF/Surg/Soc Hx:     Past Surgical History:   Procedure Laterality Date   • APPENDECTOMY     • CATARACT EXTRACTION Bilateral    • HIP SURGERY Left     total hip replacement   • JOINT REPLACEMENT      tlkr        Social History  "    Occupational History   • Not on file   Tobacco Use   • Smoking status: Never Smoker   • Smokeless tobacco: Never Used   Substance and Sexual Activity   • Alcohol use: No   • Drug use: Never   • Sexual activity: Not on file      Social History     Social History Narrative   • Not on file        Family History   Problem Relation Age of Onset   • Diabetes Brother    • Hypertension Brother    • Clotting disorder Son          Review of Systems:   A 14 point review of systems was performed, pertinent positives discussed above, all other systems are negative    Physical Exam: 75 y.o. female  Vital Signs :   Vitals:    07/09/20 1455   BP: 132/84   Temp: 98.7 °F (37.1 °C)   Weight: 66.2 kg (146 lb)   Height: 165.1 cm (65\")   PainSc:   5     General: Alert and Oriented x 3, No acute distress.  Psych: mood and affect appropriate; recent and remote memory intact  Eyes: conjunctiva clear; pupils equally round and reactive, sclera nonicteric  CV: no peripheral edema  Resp: normal respiratory effort  Skin: no rashes or wounds; normal turgor  Musculosketetal; pain and crepitance with knee range of motion  Vascular: palpable distal pulses    Xrays:  -3 views (AP, lateral, and sunrise) were ordered and reviewed demonstrating end-stage OA with bone on bone articulation.  -A full length AP xray was ordered and reviewed today for purposes of operative alignment demonstrating end stage arthritic findings. There are no previous full length films for review    Assessment:  End-stage Right knee osteoarthritis. Conservative measures have failed.      Plan:  The plan is to proceed with Right Total Knee Replacement. The patient voiced understanding of the risks, benefits, and alternative forms of treatment that were discussed with Dr Ramirez at the time of scheduling. 23     Karina Solo, APRN  7/9/2020        "

## 2020-07-09 NOTE — H&P (VIEW-ONLY)
Patient: Lizzie Bose    Date of Admission: 7/13/2020    YOB: 1945    Medical Record Number: [id number]    Admitting Physician: Dr. Krish Ramirez    Reason for Admission: End Stage Right Knee OA    History of Present Illness: 75 y.o. female presents with severe end stage knee osteoarthritis which has not been responsive to the full compliment of conservative measures. Despite conservative attempts, there is still severe, constant activity limiting pain. Given the severity of the pain, the patient has elected to proceed with knee replacement.    Allergies:   Allergies   Allergen Reactions   • Atorvastatin          Current Medications:  Home Medications:    Current Outpatient Medications on File Prior to Visit   Medication Sig   • amLODIPine-valsartan (EXFORGE) 5-160 MG per tablet Take 1 tablet by mouth.   • amphetamine-dextroamphetamine (ADDERALL) 10 MG tablet TAKE 1 TABLET BY MOUTH EVERY DAY IN THE AFTERNOON   • CALCIUM CARBONATE-VITAMIN D PO Take  by mouth.   • cholecalciferol (VITAMIN D3) 25 MCG (1000 UT) tablet Take 1,000 Units by mouth Daily.   • ezetimibe (ZETIA) 10 MG tablet Take  by mouth.   • meloxicam (MOBIC) 15 MG tablet Take 15 mg by mouth Daily. PRN   • metoprolol succinate XL (TOPROL-XL) 100 MG 24 hr tablet Take 100 mg by mouth.   • MULTIPLE VITAMIN PO Take  by mouth.   • OMEGA-3 FATTY ACIDS PO Take  by mouth.   • PRISTIQ 50 MG 24 hr tablet    • VYVANSE 40 MG capsule TAKE ONE CAPSULE BY MOUTH EVERY DAY IN THE MORNING   • zolpidem (AMBIEN) 10 MG tablet      No current facility-administered medications on file prior to visit.      PRN Meds:.    PMH:     Past Medical History:   Diagnosis Date   • High blood pressure    • Hyperlipidemia    • Low back pain        PF/Surg/Soc Hx:     Past Surgical History:   Procedure Laterality Date   • APPENDECTOMY     • CATARACT EXTRACTION Bilateral    • HIP SURGERY Left     total hip replacement   • JOINT REPLACEMENT      tlkr        Social History  "    Occupational History   • Not on file   Tobacco Use   • Smoking status: Never Smoker   • Smokeless tobacco: Never Used   Substance and Sexual Activity   • Alcohol use: No   • Drug use: Never   • Sexual activity: Not on file      Social History     Social History Narrative   • Not on file        Family History   Problem Relation Age of Onset   • Diabetes Brother    • Hypertension Brother    • Clotting disorder Son          Review of Systems:   A 14 point review of systems was performed, pertinent positives discussed above, all other systems are negative    Physical Exam: 75 y.o. female  Vital Signs :   Vitals:    07/09/20 1455   BP: 132/84   Temp: 98.7 °F (37.1 °C)   Weight: 66.2 kg (146 lb)   Height: 165.1 cm (65\")   PainSc:   5     General: Alert and Oriented x 3, No acute distress.  Psych: mood and affect appropriate; recent and remote memory intact  Eyes: conjunctiva clear; pupils equally round and reactive, sclera nonicteric  CV: no peripheral edema  Resp: normal respiratory effort  Skin: no rashes or wounds; normal turgor  Musculosketetal; pain and crepitance with knee range of motion  Vascular: palpable distal pulses    Xrays:  -3 views (AP, lateral, and sunrise) were ordered and reviewed demonstrating end-stage OA with bone on bone articulation.  -A full length AP xray was ordered and reviewed today for purposes of operative alignment demonstrating end stage arthritic findings. There are no previous full length films for review    Assessment:  End-stage Right knee osteoarthritis. Conservative measures have failed.      Plan:  The plan is to proceed with Right Total Knee Replacement. The patient voiced understanding of the risks, benefits, and alternative forms of treatment that were discussed with Dr Ramirez at the time of scheduling. 23     Karina Solo, APRN  7/9/2020        "

## 2020-07-10 ENCOUNTER — APPOINTMENT (OUTPATIENT)
Dept: PREADMISSION TESTING | Facility: HOSPITAL | Age: 75
End: 2020-07-10

## 2020-07-10 VITALS
TEMPERATURE: 98 F | SYSTOLIC BLOOD PRESSURE: 153 MMHG | BODY MASS INDEX: 24.32 KG/M2 | OXYGEN SATURATION: 99 % | HEART RATE: 88 BPM | DIASTOLIC BLOOD PRESSURE: 78 MMHG | WEIGHT: 146 LBS | HEIGHT: 65 IN | RESPIRATION RATE: 18 BRPM

## 2020-07-10 DIAGNOSIS — M17.11 PRIMARY OSTEOARTHRITIS OF RIGHT KNEE: ICD-10-CM

## 2020-07-10 LAB
ANION GAP SERPL CALCULATED.3IONS-SCNC: 13 MMOL/L (ref 5–15)
BILIRUB UR QL STRIP: NEGATIVE
BUN SERPL-MCNC: 29 MG/DL (ref 8–23)
BUN/CREAT SERPL: 36.3 (ref 7–25)
CALCIUM SPEC-SCNC: 9.8 MG/DL (ref 8.6–10.5)
CHLORIDE SERPL-SCNC: 104 MMOL/L (ref 98–107)
CLARITY UR: CLEAR
CO2 SERPL-SCNC: 20 MMOL/L (ref 22–29)
COLOR UR: YELLOW
CREAT SERPL-MCNC: 0.8 MG/DL (ref 0.57–1)
DEPRECATED RDW RBC AUTO: 44.7 FL (ref 37–54)
ERYTHROCYTE [DISTWIDTH] IN BLOOD BY AUTOMATED COUNT: 14.7 % (ref 12.3–15.4)
GFR SERPL CREATININE-BSD FRML MDRD: 70 ML/MIN/1.73
GLUCOSE SERPL-MCNC: 110 MG/DL (ref 65–99)
GLUCOSE UR STRIP-MCNC: NEGATIVE MG/DL
HCT VFR BLD AUTO: 35 % (ref 34–46.6)
HGB BLD-MCNC: 11.5 G/DL (ref 12–15.9)
HGB UR QL STRIP.AUTO: NEGATIVE
KETONES UR QL STRIP: NEGATIVE
LEUKOCYTE ESTERASE UR QL STRIP.AUTO: NEGATIVE
MCH RBC QN AUTO: 27.2 PG (ref 26.6–33)
MCHC RBC AUTO-ENTMCNC: 32.9 G/DL (ref 31.5–35.7)
MCV RBC AUTO: 82.7 FL (ref 79–97)
NITRITE UR QL STRIP: NEGATIVE
PH UR STRIP.AUTO: <=5 [PH] (ref 5–8)
PLATELET # BLD AUTO: 350 10*3/MM3 (ref 140–450)
PMV BLD AUTO: 9.2 FL (ref 6–12)
POTASSIUM SERPL-SCNC: 4.4 MMOL/L (ref 3.5–5.2)
PROT UR QL STRIP: NEGATIVE
RBC # BLD AUTO: 4.23 10*6/MM3 (ref 3.77–5.28)
SODIUM SERPL-SCNC: 137 MMOL/L (ref 136–145)
SP GR UR STRIP: 1.02 (ref 1–1.03)
UROBILINOGEN UR QL STRIP: NORMAL
WBC # BLD AUTO: 6.11 10*3/MM3 (ref 3.4–10.8)

## 2020-07-10 PROCEDURE — 85027 COMPLETE CBC AUTOMATED: CPT | Performed by: ORTHOPAEDIC SURGERY

## 2020-07-10 PROCEDURE — 93005 ELECTROCARDIOGRAM TRACING: CPT

## 2020-07-10 PROCEDURE — U0004 COV-19 TEST NON-CDC HGH THRU: HCPCS | Performed by: NURSE PRACTITIONER

## 2020-07-10 PROCEDURE — 80048 BASIC METABOLIC PNL TOTAL CA: CPT | Performed by: ORTHOPAEDIC SURGERY

## 2020-07-10 PROCEDURE — C9803 HOPD COVID-19 SPEC COLLECT: HCPCS | Performed by: NURSE PRACTITIONER

## 2020-07-10 PROCEDURE — 81003 URINALYSIS AUTO W/O SCOPE: CPT | Performed by: ORTHOPAEDIC SURGERY

## 2020-07-10 PROCEDURE — 93010 ELECTROCARDIOGRAM REPORT: CPT | Performed by: INTERNAL MEDICINE

## 2020-07-10 PROCEDURE — 36415 COLL VENOUS BLD VENIPUNCTURE: CPT

## 2020-07-10 ASSESSMENT — KOOS JR
KOOS JR SCORE: 14
KOOS JR SCORE: 52.465

## 2020-07-10 NOTE — DISCHARGE INSTRUCTIONS
Take the following medications the morning of surgery:  METOPROLOL AND PRISTIQ      General Instructions: CLEAR LIQUIDS UNTIL 9:00 AM MORNING OF SURGERY  • Do not eat solid food after midnight the night before surgery.  • You may drink clear liquids day of surgery but must stop at least one hour before your hospital arrival time.  • It is beneficial for you to have a clear drink that contains carbohydrates the day of surgery.  We suggest a 12 to 20 ounce bottle of Gatorade or Powerade for non-diabetic patients or a 12 to 20 ounce bottle of G2 or Powerade Zero for diabetic patients. (Pediatric patients, are not advised to drink a 12 to 20 ounce carbohydrate drink)    Clear liquids are liquids you can see through.  Nothing red in color.     Plain water                               Sports drinks  Sodas                                   Gelatin (Jell-O)  Fruit juices without pulp such as white grape juice and apple juice  Popsicles that contain no fruit or yogurt  Tea or coffee (no cream or milk added)  Gatorade / Powerade  G2 / Powerade Zero    • Infants may have breast milk up to four hours before surgery.  • Infants drinking formula may drink formula up to six hours before surgery.   • Patients who avoid smoking, chewing tobacco and alcohol for 4 weeks prior to surgery have a reduced risk of post-operative complications.  Quit smoking as many days before surgery as you can.  • Do not smoke, use chewing tobacco or drink alcohol the day of surgery.   • If applicable bring your C-PAP/ BI-PAP machine.  • Bring any papers given to you in the doctor’s office.  • Wear clean comfortable clothes.  • Do not wear contact lenses, false eyelashes or make-up.  Bring a case for your glasses.   • Bring crutches or walker if applicable.  • Remove all piercings.  Leave jewelry and any other valuables at home.  • Hair extensions with metal clips must be removed prior to surgery.  • The Pre-Admission Testing nurse will instruct you to  bring medications if unable to obtain an accurate list in Pre-Admission Testing.        If you were given a blood bank ID arm band remember to bring it with you the day of surgery.    Preventing a Surgical Site Infection:  • For 2 to 3 days before surgery, avoid shaving with a razor because the razor can irritate skin and make it easier to develop an infection.    • Any areas of open skin can increase the risk of a post-operative wound infection by allowing bacteria to enter and travel throughout the body.  Notify your surgeon if you have any skin wounds / rashes even if it is not near the expected surgical site.  The area will need assessed to determine if surgery should be delayed until it is healed.  • The night prior to surgery shower using a fresh bar of anti-bacterial soap (such as Dial) and clean washcloth.  Sleep in a clean bed with clean clothing.  Do not allow pets to sleep with you.  • Shower on the morning of surgery using a fresh bar of anti-bacterial soap (such as Dial) and clean washcloth.  Dry with a clean towel and dress in clean clothing.  • Ask your surgeon if you will be receiving antibiotics prior to surgery.  • Make sure you, your family, and all healthcare providers clean their hands with soap and water or an alcohol based hand  before caring for you or your wound.    Day of surgery: 7/13/2020 PT WAS INSTRUCTED PER OFFICE TO BE HERE AT 10 AM  Your arrival time is approximately two hours before your scheduled surgery time.  Upon arrival, a Pre-op nurse and Anesthesiologist will review your health history, obtain vital signs, and answer questions you may have.  The only belongings needed at this time will be a list of your home medications and if applicable your C-PAP/BI-PAP machine.  If you are staying overnight your family can leave the rest of your belongings in the car and bring them to your room later.  A Pre-op nurse will start an IV and you may receive medication in preparation  for surgery, including something to help you relax.  Your family will be able to see you in the Pre-op area.  Two visitors at a time will be allowed in the Pre-op room.  While you are in surgery your family should notify the waiting room  if they leave the waiting room area and provide a contact phone number.    Please be aware that surgery does come with discomfort.  We want to make every effort to control your discomfort so please discuss any uncontrolled symptoms with your nurse.   Your doctor will most likely have prescribed pain medications.      If you are going home after surgery you will receive individualized written care instructions before being discharged.  A responsible adult must drive you to and from the hospital on the day of your surgery and stay with you for 24 hours.    If you are staying overnight following surgery, you will be transported to your hospital room following the recovery period.  [facility] has all private rooms.    If you have any questions please call Pre-Admission Testing at [phone].  Deductibles and co-payments are collected on the day of service. Please be prepared to pay the required co-pay, deductible or deposit on the day of service as defined by your plan.    Patient Education for Self-Quarantine Process    Following your COVID testing, we strongly recommend that you do not leave your home after you have been tested for COVID except to get medical care. This includes not going to work, school or to public areas.  If this is not possible for you to do please limit your activities to only required outings.  Be sure to wear a mask when you are with other people, practice social distancing and wash your hands frequently.      The following items provide additional details to keep you safe.  • Wash your hands with soap and water frequently for at least 20 seconds.   • Avoid touching your eyes, nose and mouth with unwashed hands.  • Do not share  anything - utensils, towels, food from the same bowl.   • Have your own utensils, drinking glass, dishes, towels and bedding.   • Do not have visitors.   • Do use FaceTime to stay in touch with family and friends.  • You should stay in a specific room away from others if possible.   • Stay at least 6 feet away from others in the home if you cannot have a dedicated room to yourself.   • Do not snuggle with your pet. While the CDC says there is no evidence that pets can spread COVID-19 or be infected from humans, it is probably best to avoid “petting, snuggling, being kissed or licked and sharing food (during self-quarantine)”, according to the CDC.   • Sanitize household surfaces daily. Include all high touch areas (door handles, light switches, phones, countertops, etc.)  • Do not share a bathroom with others, if possible.   • Wear a mask around others in your home if you are unable to stay in a separate room or 6 feet apart. If  you are unable to wear a mask, have your family member wear a mask if they must be within 6 feet of you.   Call your surgeon immediately if you experience any of the following symptoms:  • Sore Throat  • Shortness of Breath or difficulty breathing  • Cough  • Chills  • Body soreness or muscle pain  • Headache  • Fever  • New loss of taste or smell  • Do not arrive for your surgery ill.  Your procedure will need to be rescheduled to another time.  You will need to call your physician before the day of surgery to avoid any unnecessary exposure to hospital staff as well as other patients.    CHLORHEXIDINE CLOTH INSTRUCTIONS  The morning of surgery follow these instructions using the Chlorhexidine cloths you've been given.  These steps reduce bacteria on the body.  Do not use the cloths near your eyes, ears mouth, genitalia or on open wounds.  Throw the cloths away after use but do not try to flush them down a toilet.      • Open and remove one cloth at a time from the package.    • Leave the  cloth unfolded and begin the bathing.  • Massage the skin with the cloths using gentle pressure to remove bacteria.  Do not scrub harshly.   • Follow the steps below with one 2% CHG cloth per area (6 total cloths).  • One cloth for neck, shoulders and chest.  • One cloth for both arms, hands, fingers and underarms (do underarms last).  • One cloth for the abdomen followed by groin.  • One cloth for right leg and foot including between the toes.  • One cloth for left leg and foot including between the toes.  • The last cloth is to be used for the back of the neck, back and buttocks.    Allow the CHG to air dry 3 minutes on the skin which will give it time to work and decrease the chance of irritation.  The skin may feel sticky until it is dry.  Do not rinse with water or any other liquid or you will lose the beneficial effects of the CHG.  If mild skin irritation occurs, do rinse the skin to remove the CHG.  Report this to the nurse at time of admission.  Do not apply lotions, creams, ointments, deodorants or perfumes after using the clothes. Dress in clean clothes before coming to the hospital.    BACTROBAN NASAL OINTMENT  There are many germs normally in your nose. Bactroban is an ointment that will help reduce these germs. Please follow these instructions for Bactroban use:      ____The day before surgery in the morning  Date________    ____The day before surgery in the evening              Date________    ____The day of surgery in the morning    Date________    **Squirt ½ package of Bactroban Ointment onto a cotton applicator and apply to inside of 1st nostril.  Squirt the remaining Bactroban and apply to the inside of the other nostril.

## 2020-07-11 LAB
REF LAB TEST METHOD: NORMAL
SARS-COV-2 RNA RESP QL NAA+PROBE: NOT DETECTED

## 2020-07-13 ENCOUNTER — HOSPITAL ENCOUNTER (INPATIENT)
Facility: HOSPITAL | Age: 75
LOS: 2 days | Discharge: HOME HEALTH | End: 2020-07-15
Attending: ORTHOPAEDIC SURGERY | Admitting: ORTHOPAEDIC SURGERY

## 2020-07-13 ENCOUNTER — ANESTHESIA (OUTPATIENT)
Dept: PERIOP | Facility: HOSPITAL | Age: 75
End: 2020-07-13

## 2020-07-13 ENCOUNTER — ANESTHESIA EVENT (OUTPATIENT)
Dept: PERIOP | Facility: HOSPITAL | Age: 75
End: 2020-07-13

## 2020-07-13 ENCOUNTER — APPOINTMENT (OUTPATIENT)
Dept: GENERAL RADIOLOGY | Facility: HOSPITAL | Age: 75
End: 2020-07-13

## 2020-07-13 DIAGNOSIS — M17.11 PRIMARY OSTEOARTHRITIS OF RIGHT KNEE: ICD-10-CM

## 2020-07-13 PROCEDURE — 73560 X-RAY EXAM OF KNEE 1 OR 2: CPT

## 2020-07-13 PROCEDURE — C1713 ANCHOR/SCREW BN/BN,TIS/BN: HCPCS | Performed by: ORTHOPAEDIC SURGERY

## 2020-07-13 PROCEDURE — 0SRC0J9 REPLACEMENT OF RIGHT KNEE JOINT WITH SYNTHETIC SUBSTITUTE, CEMENTED, OPEN APPROACH: ICD-10-PCS | Performed by: ORTHOPAEDIC SURGERY

## 2020-07-13 PROCEDURE — 27447 TOTAL KNEE ARTHROPLASTY: CPT | Performed by: NURSE PRACTITIONER

## 2020-07-13 PROCEDURE — 27447 TOTAL KNEE ARTHROPLASTY: CPT | Performed by: ORTHOPAEDIC SURGERY

## 2020-07-13 PROCEDURE — 25010000003 CEFAZOLIN IN DEXTROSE 2-4 GM/100ML-% SOLUTION: Performed by: NURSE PRACTITIONER

## 2020-07-13 PROCEDURE — 25010000002 DEXAMETHASONE PER 1 MG: Performed by: NURSE ANESTHETIST, CERTIFIED REGISTERED

## 2020-07-13 PROCEDURE — 25010000002 PHENYLEPHRINE PER 1 ML: Performed by: NURSE ANESTHETIST, CERTIFIED REGISTERED

## 2020-07-13 PROCEDURE — C1776 JOINT DEVICE (IMPLANTABLE): HCPCS | Performed by: ORTHOPAEDIC SURGERY

## 2020-07-13 PROCEDURE — 25010000003 CEFAZOLIN IN DEXTROSE 2-4 GM/100ML-% SOLUTION: Performed by: ORTHOPAEDIC SURGERY

## 2020-07-13 PROCEDURE — 25010000002 ONDANSETRON PER 1 MG: Performed by: NURSE ANESTHETIST, CERTIFIED REGISTERED

## 2020-07-13 PROCEDURE — 25010000003 BUPIVACAINE LIPOSOME 1.3 % SUSPENSION 20 ML VIAL: Performed by: ORTHOPAEDIC SURGERY

## 2020-07-13 PROCEDURE — 25010000002 PROPOFOL 10 MG/ML EMULSION: Performed by: NURSE ANESTHETIST, CERTIFIED REGISTERED

## 2020-07-13 PROCEDURE — 25010000002 VANCOMYCIN PER 500 MG: Performed by: ORTHOPAEDIC SURGERY

## 2020-07-13 PROCEDURE — C9290 INJ, BUPIVACAINE LIPOSOME: HCPCS | Performed by: ORTHOPAEDIC SURGERY

## 2020-07-13 PROCEDURE — 25010000002 KETOROLAC TROMETHAMINE PER 15 MG: Performed by: NURSE PRACTITIONER

## 2020-07-13 DEVICE — CMT BONE PALACOS R HI/VISC 1X40: Type: IMPLANTABLE DEVICE | Site: KNEE | Status: FUNCTIONAL

## 2020-07-13 DEVICE — LEGION CRUCIATE RETAINING HIGH                                    FLEX HIGHLY CROSS LINKED                                    POLYETHYLENE SIZE 3-4 9MM
Type: IMPLANTABLE DEVICE | Site: KNEE | Status: FUNCTIONAL
Brand: LEGION

## 2020-07-13 DEVICE — LEGION CRUCIATE RETAINING OXINIUM                                    FEMORAL SIZE 3 RIGHT
Type: IMPLANTABLE DEVICE | Site: KNEE | Status: FUNCTIONAL
Brand: LEGION

## 2020-07-13 DEVICE — GENESIS II NON-POROUS TIBIAL                                    BASEPLATE SIZE 3 RIGHT
Type: IMPLANTABLE DEVICE | Site: KNEE | Status: FUNCTIONAL
Brand: GENESIS II

## 2020-07-13 DEVICE — IMPLANTABLE DEVICE: Type: IMPLANTABLE DEVICE | Site: KNEE | Status: FUNCTIONAL

## 2020-07-13 DEVICE — GENESIS II BICONVEX PATELLA 29MM
Type: IMPLANTABLE DEVICE | Site: KNEE | Status: FUNCTIONAL
Brand: GENESIS II

## 2020-07-13 RX ORDER — HYDROMORPHONE HYDROCHLORIDE 1 MG/ML
0.5 INJECTION, SOLUTION INTRAMUSCULAR; INTRAVENOUS; SUBCUTANEOUS
Status: DISCONTINUED | OUTPATIENT
Start: 2020-07-13 | End: 2020-07-13 | Stop reason: HOSPADM

## 2020-07-13 RX ORDER — MELOXICAM 7.5 MG/1
7.5 TABLET ORAL DAILY
Status: DISCONTINUED | OUTPATIENT
Start: 2020-07-14 | End: 2020-07-15 | Stop reason: HOSPADM

## 2020-07-13 RX ORDER — ONDANSETRON 4 MG/1
4 TABLET, FILM COATED ORAL EVERY 6 HOURS PRN
Status: DISCONTINUED | OUTPATIENT
Start: 2020-07-13 | End: 2020-07-15 | Stop reason: HOSPADM

## 2020-07-13 RX ORDER — SODIUM CHLORIDE, SODIUM LACTATE, POTASSIUM CHLORIDE, CALCIUM CHLORIDE 600; 310; 30; 20 MG/100ML; MG/100ML; MG/100ML; MG/100ML
9 INJECTION, SOLUTION INTRAVENOUS CONTINUOUS
Status: DISCONTINUED | OUTPATIENT
Start: 2020-07-13 | End: 2020-07-13 | Stop reason: HOSPADM

## 2020-07-13 RX ORDER — HYDRALAZINE HYDROCHLORIDE 20 MG/ML
5 INJECTION INTRAMUSCULAR; INTRAVENOUS
Status: DISCONTINUED | OUTPATIENT
Start: 2020-07-13 | End: 2020-07-13 | Stop reason: HOSPADM

## 2020-07-13 RX ORDER — LABETALOL HYDROCHLORIDE 5 MG/ML
5 INJECTION, SOLUTION INTRAVENOUS
Status: DISCONTINUED | OUTPATIENT
Start: 2020-07-13 | End: 2020-07-13 | Stop reason: HOSPADM

## 2020-07-13 RX ORDER — FENTANYL CITRATE 50 UG/ML
50 INJECTION, SOLUTION INTRAMUSCULAR; INTRAVENOUS
Status: DISCONTINUED | OUTPATIENT
Start: 2020-07-13 | End: 2020-07-13 | Stop reason: HOSPADM

## 2020-07-13 RX ORDER — WOUND DRESSING ADHESIVE - LIQUID
LIQUID MISCELLANEOUS AS NEEDED
Status: DISCONTINUED | OUTPATIENT
Start: 2020-07-13 | End: 2020-07-13 | Stop reason: HOSPADM

## 2020-07-13 RX ORDER — MELOXICAM 15 MG/1
15 TABLET ORAL ONCE
Status: COMPLETED | OUTPATIENT
Start: 2020-07-13 | End: 2020-07-13

## 2020-07-13 RX ORDER — MAGNESIUM HYDROXIDE 1200 MG/15ML
LIQUID ORAL AS NEEDED
Status: DISCONTINUED | OUTPATIENT
Start: 2020-07-13 | End: 2020-07-13 | Stop reason: HOSPADM

## 2020-07-13 RX ORDER — OXYCODONE AND ACETAMINOPHEN 7.5; 325 MG/1; MG/1
1 TABLET ORAL ONCE AS NEEDED
Status: DISCONTINUED | OUTPATIENT
Start: 2020-07-13 | End: 2020-07-13 | Stop reason: HOSPADM

## 2020-07-13 RX ORDER — SODIUM CHLORIDE 0.9 % (FLUSH) 0.9 %
3 SYRINGE (ML) INJECTION EVERY 12 HOURS SCHEDULED
Status: DISCONTINUED | OUTPATIENT
Start: 2020-07-13 | End: 2020-07-13 | Stop reason: HOSPADM

## 2020-07-13 RX ORDER — CEFAZOLIN SODIUM 2 G/100ML
2 INJECTION, SOLUTION INTRAVENOUS ONCE
Status: COMPLETED | OUTPATIENT
Start: 2020-07-13 | End: 2020-07-13

## 2020-07-13 RX ORDER — BUPIVACAINE HYDROCHLORIDE 7.5 MG/ML
INJECTION, SOLUTION EPIDURAL; RETROBULBAR
Status: COMPLETED | OUTPATIENT
Start: 2020-07-13 | End: 2020-07-13

## 2020-07-13 RX ORDER — PROMETHAZINE HYDROCHLORIDE 25 MG/ML
6.25 INJECTION, SOLUTION INTRAMUSCULAR; INTRAVENOUS
Status: DISCONTINUED | OUTPATIENT
Start: 2020-07-13 | End: 2020-07-13 | Stop reason: HOSPADM

## 2020-07-13 RX ORDER — PROPOFOL 10 MG/ML
VIAL (ML) INTRAVENOUS AS NEEDED
Status: DISCONTINUED | OUTPATIENT
Start: 2020-07-13 | End: 2020-07-13 | Stop reason: SURG

## 2020-07-13 RX ORDER — PROMETHAZINE HYDROCHLORIDE 25 MG/1
25 SUPPOSITORY RECTAL ONCE AS NEEDED
Status: DISCONTINUED | OUTPATIENT
Start: 2020-07-13 | End: 2020-07-13 | Stop reason: HOSPADM

## 2020-07-13 RX ORDER — DESVENLAFAXINE SUCCINATE 50 MG/1
50 TABLET, EXTENDED RELEASE ORAL DAILY
Status: DISCONTINUED | OUTPATIENT
Start: 2020-07-14 | End: 2020-07-15 | Stop reason: HOSPADM

## 2020-07-13 RX ORDER — UREA 10 %
3 LOTION (ML) TOPICAL NIGHTLY PRN
Status: DISCONTINUED | OUTPATIENT
Start: 2020-07-13 | End: 2020-07-15 | Stop reason: HOSPADM

## 2020-07-13 RX ORDER — FLUMAZENIL 0.1 MG/ML
0.2 INJECTION INTRAVENOUS AS NEEDED
Status: DISCONTINUED | OUTPATIENT
Start: 2020-07-13 | End: 2020-07-13 | Stop reason: HOSPADM

## 2020-07-13 RX ORDER — NALOXONE HCL 0.4 MG/ML
0.2 VIAL (ML) INJECTION AS NEEDED
Status: DISCONTINUED | OUTPATIENT
Start: 2020-07-13 | End: 2020-07-13 | Stop reason: HOSPADM

## 2020-07-13 RX ORDER — SODIUM CHLORIDE 0.9 % (FLUSH) 0.9 %
3-10 SYRINGE (ML) INJECTION AS NEEDED
Status: DISCONTINUED | OUTPATIENT
Start: 2020-07-13 | End: 2020-07-13 | Stop reason: HOSPADM

## 2020-07-13 RX ORDER — ACETAMINOPHEN 10 MG/ML
1000 INJECTION, SOLUTION INTRAVENOUS ONCE
Status: COMPLETED | OUTPATIENT
Start: 2020-07-13 | End: 2020-07-13

## 2020-07-13 RX ORDER — PROMETHAZINE HYDROCHLORIDE 25 MG/ML
12.5 INJECTION, SOLUTION INTRAMUSCULAR; INTRAVENOUS ONCE AS NEEDED
Status: DISCONTINUED | OUTPATIENT
Start: 2020-07-13 | End: 2020-07-13 | Stop reason: HOSPADM

## 2020-07-13 RX ORDER — HYDROCODONE BITARTRATE AND ACETAMINOPHEN 7.5; 325 MG/1; MG/1
2 TABLET ORAL EVERY 4 HOURS PRN
Status: DISCONTINUED | OUTPATIENT
Start: 2020-07-13 | End: 2020-07-15 | Stop reason: HOSPADM

## 2020-07-13 RX ORDER — PREGABALIN 75 MG/1
150 CAPSULE ORAL ONCE
Status: COMPLETED | OUTPATIENT
Start: 2020-07-13 | End: 2020-07-13

## 2020-07-13 RX ORDER — PANTOPRAZOLE SODIUM 40 MG/1
40 TABLET, DELAYED RELEASE ORAL EVERY MORNING
Status: DISCONTINUED | OUTPATIENT
Start: 2020-07-14 | End: 2020-07-15 | Stop reason: HOSPADM

## 2020-07-13 RX ORDER — LIDOCAINE HYDROCHLORIDE 10 MG/ML
0.5 INJECTION, SOLUTION EPIDURAL; INFILTRATION; INTRACAUDAL; PERINEURAL ONCE AS NEEDED
Status: DISCONTINUED | OUTPATIENT
Start: 2020-07-13 | End: 2020-07-13 | Stop reason: HOSPADM

## 2020-07-13 RX ORDER — HYDROCODONE BITARTRATE AND ACETAMINOPHEN 7.5; 325 MG/1; MG/1
1 TABLET ORAL EVERY 4 HOURS PRN
Status: DISCONTINUED | OUTPATIENT
Start: 2020-07-13 | End: 2020-07-15 | Stop reason: HOSPADM

## 2020-07-13 RX ORDER — HYDROCODONE BITARTRATE AND ACETAMINOPHEN 7.5; 325 MG/1; MG/1
1 TABLET ORAL ONCE AS NEEDED
Status: DISCONTINUED | OUTPATIENT
Start: 2020-07-13 | End: 2020-07-13 | Stop reason: HOSPADM

## 2020-07-13 RX ORDER — DIPHENHYDRAMINE HCL 25 MG
25 CAPSULE ORAL
Status: DISCONTINUED | OUTPATIENT
Start: 2020-07-13 | End: 2020-07-13 | Stop reason: HOSPADM

## 2020-07-13 RX ORDER — CEFAZOLIN SODIUM 2 G/100ML
2 INJECTION, SOLUTION INTRAVENOUS EVERY 8 HOURS
Status: COMPLETED | OUTPATIENT
Start: 2020-07-13 | End: 2020-07-14

## 2020-07-13 RX ORDER — PROPOFOL 10 MG/ML
VIAL (ML) INTRAVENOUS CONTINUOUS PRN
Status: DISCONTINUED | OUTPATIENT
Start: 2020-07-13 | End: 2020-07-13 | Stop reason: SURG

## 2020-07-13 RX ORDER — DEXAMETHASONE SODIUM PHOSPHATE 4 MG/ML
INJECTION, SOLUTION INTRA-ARTICULAR; INTRALESIONAL; INTRAMUSCULAR; INTRAVENOUS; SOFT TISSUE AS NEEDED
Status: DISCONTINUED | OUTPATIENT
Start: 2020-07-13 | End: 2020-07-13 | Stop reason: SURG

## 2020-07-13 RX ORDER — FAMOTIDINE 10 MG/ML
20 INJECTION, SOLUTION INTRAVENOUS ONCE
Status: COMPLETED | OUTPATIENT
Start: 2020-07-13 | End: 2020-07-13

## 2020-07-13 RX ORDER — ONDANSETRON 2 MG/ML
INJECTION INTRAMUSCULAR; INTRAVENOUS AS NEEDED
Status: DISCONTINUED | OUTPATIENT
Start: 2020-07-13 | End: 2020-07-13 | Stop reason: SURG

## 2020-07-13 RX ORDER — PROMETHAZINE HYDROCHLORIDE 25 MG/1
25 TABLET ORAL ONCE AS NEEDED
Status: DISCONTINUED | OUTPATIENT
Start: 2020-07-13 | End: 2020-07-13 | Stop reason: HOSPADM

## 2020-07-13 RX ORDER — ACETAMINOPHEN 325 MG/1
650 TABLET ORAL ONCE AS NEEDED
Status: DISCONTINUED | OUTPATIENT
Start: 2020-07-13 | End: 2020-07-13 | Stop reason: HOSPADM

## 2020-07-13 RX ORDER — ONDANSETRON 2 MG/ML
4 INJECTION INTRAMUSCULAR; INTRAVENOUS ONCE AS NEEDED
Status: DISCONTINUED | OUTPATIENT
Start: 2020-07-13 | End: 2020-07-13 | Stop reason: HOSPADM

## 2020-07-13 RX ORDER — ONDANSETRON 2 MG/ML
4 INJECTION INTRAMUSCULAR; INTRAVENOUS EVERY 6 HOURS PRN
Status: DISCONTINUED | OUTPATIENT
Start: 2020-07-13 | End: 2020-07-15 | Stop reason: HOSPADM

## 2020-07-13 RX ORDER — TRANEXAMIC ACID 100 MG/ML
INJECTION, SOLUTION INTRAVENOUS AS NEEDED
Status: DISCONTINUED | OUTPATIENT
Start: 2020-07-13 | End: 2020-07-13 | Stop reason: SURG

## 2020-07-13 RX ORDER — EPHEDRINE SULFATE 50 MG/ML
5 INJECTION, SOLUTION INTRAVENOUS ONCE AS NEEDED
Status: DISCONTINUED | OUTPATIENT
Start: 2020-07-13 | End: 2020-07-13 | Stop reason: HOSPADM

## 2020-07-13 RX ORDER — ASPIRIN 81 MG/1
81 TABLET ORAL EVERY 12 HOURS SCHEDULED
Status: DISCONTINUED | OUTPATIENT
Start: 2020-07-14 | End: 2020-07-15 | Stop reason: HOSPADM

## 2020-07-13 RX ORDER — DIPHENHYDRAMINE HYDROCHLORIDE 50 MG/ML
12.5 INJECTION INTRAMUSCULAR; INTRAVENOUS
Status: DISCONTINUED | OUTPATIENT
Start: 2020-07-13 | End: 2020-07-13 | Stop reason: HOSPADM

## 2020-07-13 RX ORDER — METOPROLOL SUCCINATE 50 MG/1
50 TABLET, EXTENDED RELEASE ORAL DAILY
Status: DISCONTINUED | OUTPATIENT
Start: 2020-07-14 | End: 2020-07-15 | Stop reason: HOSPADM

## 2020-07-13 RX ORDER — LIDOCAINE HYDROCHLORIDE 20 MG/ML
INJECTION, SOLUTION INFILTRATION; PERINEURAL AS NEEDED
Status: DISCONTINUED | OUTPATIENT
Start: 2020-07-13 | End: 2020-07-13 | Stop reason: SURG

## 2020-07-13 RX ORDER — MIDAZOLAM HYDROCHLORIDE 1 MG/ML
1 INJECTION INTRAMUSCULAR; INTRAVENOUS
Status: DISCONTINUED | OUTPATIENT
Start: 2020-07-13 | End: 2020-07-13 | Stop reason: HOSPADM

## 2020-07-13 RX ORDER — VANCOMYCIN HYDROCHLORIDE 1 G/200ML
15 INJECTION, SOLUTION INTRAVENOUS ONCE
Status: COMPLETED | OUTPATIENT
Start: 2020-07-13 | End: 2020-07-13

## 2020-07-13 RX ORDER — KETOROLAC TROMETHAMINE 30 MG/ML
15 INJECTION, SOLUTION INTRAMUSCULAR; INTRAVENOUS EVERY 8 HOURS
Status: COMPLETED | OUTPATIENT
Start: 2020-07-13 | End: 2020-07-14

## 2020-07-13 RX ADMIN — PHENYLEPHRINE HYDROCHLORIDE 100 MCG: 10 INJECTION INTRAVENOUS at 14:30

## 2020-07-13 RX ADMIN — CEFAZOLIN SODIUM 2 G: 2 INJECTION, SOLUTION INTRAVENOUS at 21:57

## 2020-07-13 RX ADMIN — PHENYLEPHRINE HYDROCHLORIDE 100 MCG: 10 INJECTION INTRAVENOUS at 14:00

## 2020-07-13 RX ADMIN — MUPIROCIN 1 APPLICATION: 20 OINTMENT TOPICAL at 21:57

## 2020-07-13 RX ADMIN — LIDOCAINE HYDROCHLORIDE 3 ML: 20 INJECTION, SOLUTION INFILTRATION; PERINEURAL at 13:42

## 2020-07-13 RX ADMIN — BUPIVACAINE HYDROCHLORIDE 1.3 ML: 7.5 INJECTION, SOLUTION EPIDURAL; RETROBULBAR at 13:37

## 2020-07-13 RX ADMIN — FAMOTIDINE 20 MG: 10 INJECTION INTRAVENOUS at 12:07

## 2020-07-13 RX ADMIN — MELOXICAM 15 MG: 15 TABLET ORAL at 10:59

## 2020-07-13 RX ADMIN — PROPOFOL 50 MG: 10 INJECTION, EMULSION INTRAVENOUS at 13:42

## 2020-07-13 RX ADMIN — KETOROLAC TROMETHAMINE 15 MG: 30 INJECTION, SOLUTION INTRAMUSCULAR at 15:53

## 2020-07-13 RX ADMIN — TRANEXAMIC ACID 1000 MG: 100 INJECTION, SOLUTION INTRAVENOUS at 14:37

## 2020-07-13 RX ADMIN — PREGABALIN 150 MG: 75 CAPSULE ORAL at 10:59

## 2020-07-13 RX ADMIN — PHENYLEPHRINE HYDROCHLORIDE 100 MCG: 10 INJECTION INTRAVENOUS at 14:18

## 2020-07-13 RX ADMIN — PHENYLEPHRINE HYDROCHLORIDE 200 MCG: 10 INJECTION INTRAVENOUS at 14:12

## 2020-07-13 RX ADMIN — PHENYLEPHRINE HYDROCHLORIDE 100 MCG: 10 INJECTION INTRAVENOUS at 14:36

## 2020-07-13 RX ADMIN — CEFAZOLIN SODIUM 2 G: 2 INJECTION, SOLUTION INTRAVENOUS at 13:32

## 2020-07-13 RX ADMIN — SODIUM CHLORIDE, POTASSIUM CHLORIDE, SODIUM LACTATE AND CALCIUM CHLORIDE: 600; 310; 30; 20 INJECTION, SOLUTION INTRAVENOUS at 15:04

## 2020-07-13 RX ADMIN — PROPOFOL 100 MCG/KG/MIN: 10 INJECTION, EMULSION INTRAVENOUS at 13:42

## 2020-07-13 RX ADMIN — ACETAMINOPHEN 1000 MG: 10 INJECTION, SOLUTION INTRAVENOUS at 13:48

## 2020-07-13 RX ADMIN — VANCOMYCIN HYDROCHLORIDE 1000 MG: 1 INJECTION, SOLUTION INTRAVENOUS at 11:10

## 2020-07-13 RX ADMIN — ONDANSETRON HYDROCHLORIDE 4 MG: 2 SOLUTION INTRAMUSCULAR; INTRAVENOUS at 13:42

## 2020-07-13 RX ADMIN — HYDROCODONE BITARTRATE AND ACETAMINOPHEN 1 TABLET: 7.5; 325 TABLET ORAL at 23:29

## 2020-07-13 RX ADMIN — DEXAMETHASONE SODIUM PHOSPHATE 8 MG: 4 INJECTION INTRA-ARTICULAR; INTRALESIONAL; INTRAMUSCULAR; INTRAVENOUS; SOFT TISSUE at 13:42

## 2020-07-13 RX ADMIN — KETOROLAC TROMETHAMINE 15 MG: 30 INJECTION, SOLUTION INTRAMUSCULAR at 23:28

## 2020-07-13 RX ADMIN — SODIUM CHLORIDE, POTASSIUM CHLORIDE, SODIUM LACTATE AND CALCIUM CHLORIDE 9 ML/HR: 600; 310; 30; 20 INJECTION, SOLUTION INTRAVENOUS at 10:44

## 2020-07-13 RX ADMIN — PHENYLEPHRINE HYDROCHLORIDE 100 MCG: 10 INJECTION INTRAVENOUS at 14:21

## 2020-07-13 RX ADMIN — HYDROCODONE BITARTRATE AND ACETAMINOPHEN 1 TABLET: 7.5; 325 TABLET ORAL at 18:52

## 2020-07-13 NOTE — PROGRESS NOTES
Discharge Planning Assessment  [facility]     Patient Name: Lizzie Bose  MRN: [id number]  Today's Date: 7/13/2020    Admit Date: 7/13/2020    Discharge Needs Assessment     Row Name 07/13/20 1646       Living Environment    Lives With  alone    Name(s) of Who Lives With Patient  Spouse is in [place]     Current Living Arrangements  home/apartment/condo    Family Caregiver if Needed  child(bel), adult    Family Caregiver Names  Catalino/son     Quality of Family Relationships  involved       Resource/Environmental Concerns    Resource/Environmental Concerns  none       Transition Planning    Patient/Family Anticipates Transition to  inpatient rehabilitation facility       Discharge Needs Assessment    Readmission Within the Last 30 Days  no previous admission in last 30 days    Concerns to be Addressed  adjustment to diagnosis/illness    Equipment Currently Used at Home  cane, straight;walker, standard;shower chair    Discharge Facility/Level of Care Needs  home with home health;rehabilitation facility;nursing facility, skilled        Discharge Plan     Row Name 07/13/20 0765       Plan    Plan  referral to [facility]     Provided Post Acute Provider List?  N/A    N/A Provider List Comment  referral to [facility] per pre-op assessment     Patient/Family in Agreement with Plan  yes    Plan Comments  Facesheet and dc plan verified per Devon/ pre-op assessment. Pt lives alone, she would like to dc to short-term rehab prior to returning home. She registered w/ [facility] prior to surgery. Epic referral sent to [facility]; [facility] will f/u 7/14 for bed availability.         Destination      Service Provider Request Status Selected Services Address Phone Number Fax Number    [facility] Pending - Request Sent N/A [address] [facility] [id number] [phone]      Durable Medical Equipment      Coordination has not been started for this encounter.      Dialysis/Infusion      Coordination  has not been started for this encounter.      Home Medical Care      Coordination has not been started for this encounter.      Therapy      Coordination has not been started for this encounter.      Community Resources      Coordination has not been started for this encounter.          Demographic Summary    No documentation.       Functional Status    No documentation.       Psychosocial    No documentation.       Abuse/Neglect    No documentation.       Legal    No documentation.       Substance Abuse    No documentation.       Patient Forms    No documentation.           Susanna Beard RN

## 2020-07-13 NOTE — ANESTHESIA PREPROCEDURE EVALUATION
Anesthesia Evaluation     Patient summary reviewed and Nursing notes reviewed                Airway   Mallampati: II  Dental    (+) implants    Pulmonary    (+) sleep apnea,   Cardiovascular     ECG reviewed  Rhythm: irregular  Rate: normal    (+) hypertension, dysrhythmias PVC, hyperlipidemia,       Neuro/Psych  (+) psychiatric history ADD and Depression,     GI/Hepatic/Renal/Endo - negative ROS     Musculoskeletal     Abdominal    Substance History - negative use     OB/GYN negative ob/gyn ROS         Other   arthritis,                      Anesthesia Plan    ASA 3     spinal       Anesthetic plan, all risks, benefits, and alternatives have been provided, discussed and informed consent has been obtained with: patient and other.

## 2020-07-13 NOTE — DISCHARGE PLACEMENT REQUEST
"Lizzie Bose (75 y.o. Female)     Date of Birth Social Security Number Address Home Phone MRN    1945  [id number] [address] [phone] [id number]    [employer] Mar[employer] Status          None        Admission Date Admission Type Admitting Provider Attending Provider Department, Room/Bed    7/13/20 Elective Krish Ramirez MD Brown, Reid B, MD [address], P779/1    Discharge Date Discharge Disposition Discharge Destination                       Attending Provider:  Krish Ramirez MD    Allergies:  Atorvastatin    Isolation:  None   Infection:  None   Code Status:  Not on file    Ht:  160 cm (63\")   Wt:  64.2 kg (141 lb 7 oz)    Admission Cmt:  None   Principal Problem:  Primary osteoarthritis of right knee [M17.11] More...                 Active Insurance as of 7/13/2020     Primary Coverage     Payor Plan Insurance Group Employer/Plan Group    MEDICARE MEDICARE A & B      Payor Plan Address Payor Plan Phone Number Payor Plan Fax Number Effective Dates    [address] [phone]  4/1/2008 - None Entered    [facility] [id number]       Subscriber Name Subscriber Birth Date Member ID       LIZZIE BOSE 1945 [id number]           Secondary Coverage     Payor Plan Insurance Group Employer/Plan Group    [address] [place] [id number]     Payor Plan Address Payor Plan Phone Number Payor Plan Fax Number Effective Dates    [address]   1/10/2016 - None Entered    [facility] [id number]       Subscriber Name Subscriber Birth Date Member ID       JOSE GDEVON G 9/27/1942 [id number]                 Emergency Contacts      (Rel.) Home Phone Work Phone Mobile Phone    Devon Bose (Spouse) -- -- [phone]    JOSE GTOBIND (Son) -- -- [phone]              "

## 2020-07-13 NOTE — ANESTHESIA POSTPROCEDURE EVALUATION
Patient: Lizzie Bose    Procedure Summary     Date:  07/13/20 Room / Location:  [facility] OR [address] [place] [address] MAIN OR    Anesthesia Start:  1326 Anesthesia Stop:  1516    Procedure:  TOTAL KNEE ARTHROPLASTY (Right Knee) Diagnosis:       Primary osteoarthritis of right knee      (Primary osteoarthritis of right knee [M17.11])    Surgeon:  Krish Ramirez MD Provider:  Hammad Mark MD    Anesthesia Type:  spinal ASA Status:  3          Anesthesia Type: spinal    Vitals  Vitals Value Taken Time   /69 7/13/2020  4:00 PM   Temp 37.2 °C (98.9 °F) 7/13/2020  3:12 PM   Pulse 64 7/13/2020  4:05 PM   Resp 16 7/13/2020  4:00 PM   SpO2 97 % 7/13/2020  4:05 PM   Vitals shown include unvalidated device data.        Post Anesthesia Care and Evaluation    Patient location during evaluation: bedside  Patient participation: complete - patient participated  Level of consciousness: awake and alert  Pain management: adequate  Airway patency: patent  Anesthetic complications: No anesthetic complications  PONV Status: controlled  Cardiovascular status: acceptable  Respiratory status: acceptable  Hydration status: acceptable

## 2020-07-13 NOTE — PLAN OF CARE
Problem: Patient Care Overview  Goal: Plan of Care Review  Outcome: Ongoing (interventions implemented as appropriate)  Flowsheets  Taken 7/13/2020 1732 by Latisha Fernando, RN  Progress: no change  Outcome Summary: pt admitted to unit from PACU at this time SP RTK. Pt not up with PT, ambulation aid. Pain controlled at this time. up to BSC. pt plans to D/C home with HH im am. NVI. NATASHA in place, Green light flashing. Educated on the importance of BP monitoring and the use of medications as ordered for HO HTN. Verbalized understanding. will cont to monitor.  Taken 7/13/2020 1615 by Lucia Sweeney, RN  Plan of Care Reviewed With: patient

## 2020-07-13 NOTE — ANESTHESIA PROCEDURE NOTES
Spinal Block      Patient reassessed immediately prior to procedure    Patient location during procedure: OR  Indication:at surgeon's request, post-op pain management and procedure for pain  Performed By  Anesthesiologist: Hammad Mark MD  CRNA: Magalie Leblanc CRNA  Preanesthetic Checklist  Completed: patient identified, pre-op evaluation, timeout performed, IV checked, risks and benefits discussed and monitors and equipment checked  Spinal Block Prep:  Patient Position:sitting  Sterile Tech:cap, gloves, mask and sterile barriers  Prep:Chloraprep  Patient Monitoring:blood pressure monitoring and continuous pulse oximetry  Spinal Block Procedure  Approach:midline  Guidance:landmark technique  Location:L4-L5  Needle Type:Sprotte  Needle Gauge:24 G  Placement of Spinal needle event:cerebrospinal fluid aspirated  Paresthesia: no  Fluid Appearance:clear  Medications: bupivacaine PF (MARCAINE) 0.75 % injection, 1.3 mL  Med Administered at 7/13/2020 1:37 PM   Post Assessment  Patient Tolerance:patient tolerated the procedure well with no apparent complications  Complications no

## 2020-07-14 LAB
HCT VFR BLD AUTO: 28.9 % (ref 34–46.6)
HGB BLD-MCNC: 9.5 G/DL (ref 12–15.9)

## 2020-07-14 PROCEDURE — 25010000003 CEFAZOLIN IN DEXTROSE 2-4 GM/100ML-% SOLUTION: Performed by: NURSE PRACTITIONER

## 2020-07-14 PROCEDURE — 97161 PT EVAL LOW COMPLEX 20 MIN: CPT

## 2020-07-14 PROCEDURE — 85018 HEMOGLOBIN: CPT | Performed by: NURSE PRACTITIONER

## 2020-07-14 PROCEDURE — 85014 HEMATOCRIT: CPT | Performed by: NURSE PRACTITIONER

## 2020-07-14 PROCEDURE — 25010000002 KETOROLAC TROMETHAMINE PER 15 MG: Performed by: NURSE PRACTITIONER

## 2020-07-14 PROCEDURE — 97110 THERAPEUTIC EXERCISES: CPT

## 2020-07-14 RX ORDER — ASPIRIN 81 MG/1
81 TABLET ORAL EVERY 12 HOURS SCHEDULED
Qty: 60 TABLET | Refills: 0 | Status: SHIPPED | OUTPATIENT
Start: 2020-07-14 | End: 2020-08-06

## 2020-07-14 RX ORDER — HYDROCODONE BITARTRATE AND ACETAMINOPHEN 7.5; 325 MG/1; MG/1
TABLET ORAL
Qty: 60 TABLET | Refills: 0 | Status: SHIPPED | OUTPATIENT
Start: 2020-07-14 | End: 2020-07-22 | Stop reason: SDUPTHER

## 2020-07-14 RX ORDER — POLYETHYLENE GLYCOL 3350 17 G/17G
17 POWDER, FOR SOLUTION ORAL 2 TIMES DAILY
Qty: 14 PACKET | Refills: 0 | Status: SHIPPED | OUTPATIENT
Start: 2020-07-14 | End: 2020-07-21

## 2020-07-14 RX ORDER — PANTOPRAZOLE SODIUM 40 MG/1
40 TABLET, DELAYED RELEASE ORAL EVERY MORNING
Qty: 14 TABLET | Refills: 0 | Status: SHIPPED | OUTPATIENT
Start: 2020-07-15 | End: 2020-07-29

## 2020-07-14 RX ORDER — ONDANSETRON 4 MG/1
4 TABLET, FILM COATED ORAL EVERY 6 HOURS PRN
Qty: 10 TABLET | Refills: 0 | Status: SHIPPED | OUTPATIENT
Start: 2020-07-14 | End: 2020-07-22 | Stop reason: SDUPTHER

## 2020-07-14 RX ADMIN — HYDROCODONE BITARTRATE AND ACETAMINOPHEN 1 TABLET: 7.5; 325 TABLET ORAL at 16:20

## 2020-07-14 RX ADMIN — MELOXICAM 7.5 MG: 7.5 TABLET ORAL at 08:32

## 2020-07-14 RX ADMIN — KETOROLAC TROMETHAMINE 15 MG: 30 INJECTION, SOLUTION INTRAMUSCULAR at 08:31

## 2020-07-14 RX ADMIN — AMLODIPINE BESYLATE: 5 TABLET ORAL at 08:32

## 2020-07-14 RX ADMIN — METOPROLOL SUCCINATE 50 MG: 50 TABLET, EXTENDED RELEASE ORAL at 08:32

## 2020-07-14 RX ADMIN — POLYETHYLENE GLYCOL 3350 17 G: 17 POWDER, FOR SOLUTION ORAL at 08:31

## 2020-07-14 RX ADMIN — DESVENLAFAXINE SUCCINATE 50 MG: 50 TABLET, EXTENDED RELEASE ORAL at 08:32

## 2020-07-14 RX ADMIN — POLYETHYLENE GLYCOL 3350 17 G: 17 POWDER, FOR SOLUTION ORAL at 22:12

## 2020-07-14 RX ADMIN — Medication 3 MG: at 02:25

## 2020-07-14 RX ADMIN — PANTOPRAZOLE SODIUM 40 MG: 40 TABLET, DELAYED RELEASE ORAL at 05:39

## 2020-07-14 RX ADMIN — CEFAZOLIN SODIUM 2 G: 2 INJECTION, SOLUTION INTRAVENOUS at 05:39

## 2020-07-14 RX ADMIN — ASPIRIN 81 MG: 81 TABLET, COATED ORAL at 08:32

## 2020-07-14 RX ADMIN — HYDROCODONE BITARTRATE AND ACETAMINOPHEN 1 TABLET: 7.5; 325 TABLET ORAL at 05:39

## 2020-07-14 RX ADMIN — ASPIRIN 81 MG: 81 TABLET, COATED ORAL at 22:11

## 2020-07-14 RX ADMIN — HYDROCODONE BITARTRATE AND ACETAMINOPHEN 1 TABLET: 7.5; 325 TABLET ORAL at 22:12

## 2020-07-14 RX ADMIN — KETOROLAC TROMETHAMINE 15 MG: 30 INJECTION, SOLUTION INTRAMUSCULAR at 16:27

## 2020-07-14 RX ADMIN — MUPIROCIN 1 APPLICATION: 20 OINTMENT TOPICAL at 08:32

## 2020-07-14 NOTE — DISCHARGE SUMMARY
Patient Name: Lizzie Bose  Patient YOB: 1945    Date of Admission:  7/13/2020  Date of Discharge:  7/14/2020  Discharge Diagnosis: TOTAL KNEE ARTHROPLASTY  Presenting Problem/History of Present Illness: Primary osteoarthritis of right knee [M17.11]  OA (osteoarthritis) of knee [M17.10]  Admitting Physician: Dr Krish Ramirez  Consults:   Consults     No orders found for last 30 day(s).          DETAILS OF HOSPITAL STAY:  Patient is a 75 y.o. female was admitted to the floor following the above procedure and underwent an uncomplicated hospital stay.  Patient did well with physical therapy and was ambulating well without problems.  On the day of discharge the wound was clean, dry and intact and calf was soft and non tender and Homans sign was negative.  Patient was tolerating  without problems.  Patient will be discharged home.    Condition on Discharge:  Stable    Vital Signs  Temp:  [97.3 °F (36.3 °C)-98.9 °F (37.2 °C)] 97.7 °F (36.5 °C)  Heart Rate:  [60-77] 68  Resp:  [16-18] 16  BP: (108-145)/(64-97) 108/69    LABS:      Admission on 07/13/2020   Component Date Value Ref Range Status   • Hemoglobin 07/14/2020 9.5* 12.0 - 15.9 g/dL Final   • Hematocrit 07/14/2020 28.9* 34.0 - 46.6 % Final       No results found.    Discharge Medications     Discharge Medications      New Medications      Instructions Start Date   aspirin 81 MG EC tablet   81 mg, Oral, Every 12 Hours Scheduled      HYDROcodone-acetaminophen 7.5-325 MG per tablet  Commonly known as:  NORCO   1-2 po q 4-6 hr prn pain      ondansetron 4 MG tablet  Commonly known as:  ZOFRAN   4 mg, Oral, Every 6 Hours PRN      pantoprazole 40 MG EC tablet  Commonly known as:  PROTONIX   40 mg, Oral, Every Morning   Start Date:  July 15, 2020     polyethylene glycol 17 g packet  Commonly known as:  MIRALAX   17 g, Oral, 2 Times Daily         Continue These Medications      Instructions Start Date   amLODIPine-valsartan 5-160 MG per tablet  Commonly known  as:  EXFORGE   1 tablet, Oral, Daily      amphetamine-dextroamphetamine 10 MG tablet  Commonly known as:  ADDERALL   HOLD PRIOR TO SURG      cholecalciferol 25 MCG (1000 UT) tablet  Commonly known as:  VITAMIN D3   1,000 Units, Oral, Daily      meloxicam 15 MG tablet  Commonly known as:  MOBIC   15 mg, Oral, Daily, PRN/INSTRUCTED PT TO FOLLOW MD INSTRUCTIONS REGARDING HOLDING FOR SURGERY      metoprolol succinate  MG 24 hr tablet  Commonly known as:  TOPROL-XL   50 mg, Oral, Daily      Pristiq 50 MG 24 hr tablet  Generic drug:  desvenlafaxine   50 mg, Oral, Daily      Zetia 10 MG tablet  Generic drug:  ezetimibe   10 mg, Oral, Nightly      zolpidem 10 MG tablet  Commonly known as:  AMBIEN   10 mg, Oral, Nightly PRN         Stop These Medications    CALCIUM CARBONATE-VITAMIN D PO     CHLORHEXIDINE GLUCONATE CLOTH EX     MULTIPLE VITAMIN PO     mupirocin 2 % nasal ointment  Commonly known as:  BACTROBAN     OMEGA-3 FATTY ACIDS PO            Activity at Discharge:     Discharge Instructions: Patient is to continue with physical therapy exercises daily and continue working with the physical therapist as ordered. Patient may weight bear as tolerated. Apply ice regularly. Patient may ice for long periods of time as long as ice is not directly on the skin. Patient instructed on frequent calf pumping exercises.  Patient also instructed on incentive spirometer during hospitalization and encouraged to continue to use at home regularly.    Dressing: The dressing is designed to be left in place until you return to the office in 2 weeks.  The suction unit should stop functioning at 7 days and the green light will switch to yellow.  At that point the suction unit and tubing can be disconnected at the port closest to the dressing.  The suction unit and tubing may be discarded.  You may shower immediately upon return home, you will need to turn the pump off by depressing the orange button once and then you may disconnect the  pump and tubing at the connection port.  After showering, shake off the excess water and reattach the tubing.  Restart the pump by depressing the orange button one time and you will notice the green light flashing again.  If the dressing becomes disloged or saturated it should be changed. Please refer to the NATASHA information sheet if you have any questions about the dressing.  If you have a home health nurse or therapist they can be contacted to assist with dressing change or repair. You may also call the NATASHA dressing hotline for questions related to the dressing ([phone]). If there still other problems or questions related to the dressing despite these measures then you can contact Lola at our office [phone].  If for some reason the NATASHA dressing is removed, after 7 days the wound can be gently cleaned with antibacterial soap then allowed to dry and covered with a dry sterile dressing. The wound should be covered at all times except while showering.  Patient may change dressings daily and prn using sterile 4x4 and paper tape, and should call if any unusual drainage, redness or swelling.*  Follow up appointment in 2 weeks - patient to call the office at [phone] to schedule.  Patient will be discharged on aspirin 81mg BID x weeks, then daily x 4weeks    Discharge Diagnosis:    Patient Active Problem List   Diagnosis   • PAT (obstructive sleep apnea)   • Lumbar pain   • OA (osteoarthritis) of knee   • Primary osteoarthritis of right knee       Follow-up Appointments  No future appointments.  Additional Instructions for the Follow-ups that You Need to Schedule     Referral to home health   As directed      PT to see for Home PT protocol. Daily for first week then 3x/ wk for second week. Please transition to OP PT as soon as Pt is ready. Do not continue home PT if  Pt is capable of transitioning to OP Pt.  Please assist in arranging OP PT.  Staples to be removed at f/u appointment in 2 weeks.    Order  Comments:  PT to see for Home PT protocol. Daily for first week then 3x/ wk for second week. Please transition to OP PT as soon as Pt is ready. Do not continue home PT if  Pt is capable of transitioning to OP Pt.  Please assist in arranging OP PT.  Staples to be removed at f/u appointment in 2 weeks.     Face to Face Visit Date:  7/14/2020    Follow-up provider for Plan of Care?:  I will be treating the patient on an ongoing basis.  Please send me the Plan of Care for signature.    Follow-up provider:  FARHEEN RAMIREZ [[id number]]    Reason/Clinical Findings:  post op knee replacement    Describe mobility limitations that make leaving home difficult:  pain, swelling, weakness, limited mobility, difficulty ambulating without assistive device    Nursing/Therapeutic Services Requested:  Physicial Therapy                  Farheen Ramirez MD  07/14/20  07:36

## 2020-07-14 NOTE — PLAN OF CARE
Problem: Patient Care Overview  Goal: Plan of Care Review  Outcome: Ongoing (interventions implemented as appropriate)  Flowsheets (Taken 7/14/2020 8121)  Progress: improving  Plan of Care Reviewed With: patient  Note:   VSS. Pain controlled with PO pain med. Pt sleeping between care today. Up  to chair and worked with PT today. Ambulates with assist and walker. Voiding per BRP. Discussed bp med and monitoring r/t HTN. Discharging home tomorrow with HH.

## 2020-07-14 NOTE — PLAN OF CARE
Problem: Patient Care Overview  Goal: Plan of Care Review  Outcome: Ongoing (interventions implemented as appropriate)  Flowsheets (Taken 7/14/2020 7943)  Progress: improving  Plan of Care Reviewed With: patient  Outcome Summary: HTN well controlled. pain well controlled.     Problem: Fall Risk (Adult)  Goal: Identify Related Risk Factors and Signs and Symptoms  Outcome: Ongoing (interventions implemented as appropriate)

## 2020-07-14 NOTE — OP NOTE
Name: Lizzie Bose  YOB: 1945    DATE OF SURGERY: 7/13/2020    PREOPERATIVE DIAGNOSIS: Right knee end-stage osteoarthritis    POSTOPERATIVE DIAGNOSIS: Right knee end-stage osteoarthritis    PROCEDURE PERFORMED: Right total knee replacement    SURGEON: Krish Ramirez M.D.    ASSISTANT: PATITO CASSIDY    IMPLANTS: [employer]:     Implant Name Type Inv. Item Serial No.  Lot No. LRB No. Used   CMT BONE PALACOS R HI/VISC 1X40 - [id number] Implant CMT BONE PALACOS R HI/VISC 1X40  [facility] [id number] Right 1   PAT GEN2 BICONVEX [id number] - [id number] Implant PAT GEN2 BICONVEX [id number]  [employer] [id number] Right 1   BASE TIB/KN GEN2 NONPOR TI SZ3 RT - [id number] Implant BASE TIB/KN GEN2 NONPOR TI SZ3 RT  [employer] [id number] Right 1   COMP FEM LEGION OXINIUM CR SZ3 RT - [id number] Implant COMP FEM LEGION OXINIUM CR SZ3 RT  [employer] [id number] Right 1   INSRT ART LEGION CR HF XLPE SZ3TO4 9MM - [id number] Implant INSRT ART LEGION CR HF XLPE SZ3TO4 9MM  [employer] [id number] Right 1       Estimated Blood Loss: 200cc  Specimens : none  Complications: none    DESCRIPTION OF PROCEDURE: The patient was taken to the operating room and placed in the supine position. A sequential compression device was carefully placed on the non-operative leg. Preoperative antibiotics were administered. Surgical time out was performed. After adequate induction of anesthesia, the leg was prepped and draped in the usual sterile fashion, exsanguinated with an Esmarch bandage and the tourniquet inflated to 250 mmHg. A midline incision was performed followed by a medial parapatellar arthrotomy. The patella was subluxed laterally.  A portion of the fat pad, ACL, and anterior horns of the meniscus were excised. The drill hole was placed in the distal femur and the canal was the irrigated and suctioned. The IM davy was placed and a 5 degree distal valgus cut was performed on the femur. The  femur was then sized with a sizing guide. The femoral cutting block was placed and all femoral cuts were performed. The proximal tibia was exposed. We used the extramedullary tibial cutting guide set for removal of 9mm of bone off the high side. The tibial cut was performed. The posterior horns of the menisci were excised. The posterior osteophytes were removed. Flexion extension blocks were then used to balance the knee. The tibial cut surface was then sized with the sizing templates and the tibial and femoral trial were then placed. The knee was placed in full extension and then the tibial tray rotation was then matched to the femoral rotation and marked.    Attention was then placed to the patella. The patella was noted to track centrally through range of motion. The patella was then sized with the trials. The thickness of the patella was then measured. The patella was resurfaced and the surrounding osteophytes were removed. The preoperative thickness was reproduced. The patella tracked centrally through range of motion.   At this point all trial components were removed, the knee was copiously irrigated with pulsed lavage, and the knee was injected with anesthetic cocktail solution. The cut surfaces were then dried with clean lap sponges, and the components were cemented tibia, followed by femur, then patella. The knee was held in full extension and all excess cement was removed. The knee was held still until the cement had completely hardened. We then placed the trial polyethylene spacer which resulted in full extension and excellent flexion-extension balance. We placed the final polyethylene spacer.   The knee was then copiously irrigated. The tourniquet was then released. There was excellent hemostasis. We placed a one-eighth inch Hemovac drain. We closed the knee in multiple layers in standard fashion. Sterile dressing were applied. At the end of the case, the sponge and needle counts were reported as being  correct. There were no known complications. The patient was then transported to the recovery room.      Krish Ramirez M.D.

## 2020-07-14 NOTE — PROGRESS NOTES
Case Management Discharge Note      Final Note: Pt has decided to dc home w/ the help of her son instead of rehab. Epic referral sent to [facility] per pt's request. Ambika notified. Pt lives in a single story home. She states she has a walker, BSC and cane.     Provided Post Acute Provider List?: N/A  N/A Provider List Comment: referral to [facility] per pre-op assessment     Destination - Selection Complete      Service Provider Request Status Selected Services Address Phone Number Fax Number    [facility] [address] [place] [id number] [phone]      Durable Medical Equipment      No service has been selected for the patient.      Dialysis/Infusion      No service has been selected for the patient.      Home Medical Care      No service has been selected for the patient.      Therapy      No service has been selected for the patient.      Community Resources      No service has been selected for the patient.             Final Discharge Disposition Code: 06 - home with home health care

## 2020-07-14 NOTE — PLAN OF CARE
Problem: Patient Care Overview  Goal: Plan of Care Review  Flowsheets (Taken 7/14/2020 1101)  Outcome Summary: Pt. has currently met all inpt. P.T. goals and is independent with general, functional mobility.  Plan is to discharge home with HHPT.  Nursing notified.   Patient was wearing a face mask during this therapy encounter. Therapist used appropriate personal protective equipment including mask and gloves.  Mask used was standard procedure mask. Appropriate PPE was worn during the entire therapy session. Hand hygiene was completed before and after therapy session. Patient is not in enhanced droplet precautions.

## 2020-07-14 NOTE — NURSING NOTE
Spoke to Dr. Ramirez r/t pt not feeling comfortable for d/c home today as she lives alone. Ok to hold discharge until tomorrow per MD.

## 2020-07-14 NOTE — DISCHARGE PLACEMENT REQUEST
"Lizzie Bose (75 y.o. Female)     Date of Birth Social Security Number Address Home Phone MRN    1945  [id number] [address] [phone] [id number]    [employer] Marital Status          None        Admission Date Admission Type Admitting Provider Attending Provider Department, Room/Bed    7/13/20 Elective Krish Ramirez MD Brown, Reid B, MD [address], P779/1    Discharge Date Discharge Disposition Discharge Destination         [place] [facility]              Attending Provider:  Krish Ramirez MD    Allergies:  Atorvastatin    Isolation:  None   Infection:  None   Code Status:  CPR    Ht:  160 cm (62.99\")   Wt:  64.2 kg (141 lb 8.6 oz)    Admission Cmt:  None   Principal Problem:  Primary osteoarthritis of right knee [M17.11] More...                 Active Insurance as of 7/13/2020     Primary Coverage     Payor Plan Insurance Group Employer/Plan Group    MEDICARE MEDICARE A & B      Payor Plan Address Payor Plan Phone Number Payor Plan Fax Number Effective Dates    [address] [phone]  4/1/2008 - None Entered    [facility] [id number]       Subscriber Name Subscriber Birth Date Member ID       LIZZIE BOSE 1945 [id number]           Secondary Coverage     Payor Plan Insurance Group Employer/Plan Group    [facility] [id number]     Payor Plan Address Payor Plan Phone Number Payor Plan Fax Number Effective Dates    [address]   1/10/2016 - None Entered    [facility] [id number]       Subscriber Name Subscriber Birth Date Member ID       DEVON BOSE 9/27/1942 [id number]                 Emergency Contacts      (Rel.) Home Phone Work Phone Mobile Phone    Devon Bose (Spouse) -- -- [phone]    JOSE GYISEL (Son) -- -- [phone]              "

## 2020-07-14 NOTE — THERAPY DISCHARGE NOTE
Patient Name: Lizzie Bose  : 1945    MRN: [id number]                              Today's Date: 2020       Admit Date: 2020    Visit Dx:     ICD-10-CM ICD-9-CM   1. Primary osteoarthritis of right knee M17.11 715.16     Patient Active Problem List   Diagnosis   • PAT (obstructive sleep apnea)   • Lumbar pain   • OA (osteoarthritis) of knee   • Primary osteoarthritis of right knee     Past Medical History:   Diagnosis Date   • ADD (attention deficit disorder)    • Arthritis    • Depression    • High blood pressure    • History of Clostridioides difficile infection    • Hyperlipidemia    • Low back pain    • Right knee pain    • Sleep apnea     NO USING MACHINE     Past Surgical History:   Procedure Laterality Date   • APPENDECTOMY     • CARPAL TUNNEL RELEASE Right    • CATARACT EXTRACTION Bilateral    • HAMMER TOE REPAIR Left    • HIP ARTHROPLASTY Left    • TOTAL KNEE ARTHROPLASTY Right 2020    Procedure: TOTAL KNEE ARTHROPLASTY;  Surgeon: Krish Ramirez MD;  Location: [facility];  Service: Orthopedics;  Laterality: Right;     General Information     Row Name 20 1057          PT Evaluation Time/Intention    Document Type  discharge evaluation/summary  -MS     Mode of Treatment  physical therapy;individual therapy  -MS     Row Name 20 1057          General Information    Patient Profile Reviewed?  yes  -MS     Prior Level of Function  independent:  -MS     Existing Precautions/Restrictions  -- Exit alarm   -MS     Barriers to Rehab  none identified  -MS     Row Name 20 1057          Cognitive Assessment/Intervention- PT/OT    Orientation Status (Cognition)  oriented x 3  -MS     Row Name 20 1057          Safety Issues, Functional Mobility    Comment, Safety Issues/Impairments (Mobility)  Gait belt used for safety.   -MS       User Key  (r) = Recorded By, (t) = Taken By, (c) = Cosigned By    Initials Name Provider Type    Hammad Ritchie, PT Physical Therapist         Mobility     Row Name 07/14/20 1058          Bed Mobility Assessment/Treatment    Bed Mobility Assessment/Treatment  supine-sit;sit-supine  -MS     Supine-Sit [place] (Bed Mobility)  independent  -MS     Sit-Supine [place] (Bed Mobility)  independent  -MS     Row Name 07/14/20 1058          Sit-Stand Transfer    Sit-Stand [place] (Transfers)  conditional independence  -MS     Assistive Device (Sit-Stand Transfers)  walker, front-wheeled  -MS     Row Name 07/14/20 1058          Gait/Stairs Assessment/Training    [place] Level (Gait)  supervision  -MS     Assistive Device (Gait)  walker, front-wheeled  -MS     Distance in Feet (Gait)  100 feet  -MS     Pattern (Gait)  step-through  -MS     Deviations/Abnormal Patterns (Gait)  antalgic;andrea decreased  -MS     [place] Level (Stairs)  stand by assist  -MS     Number of Steps (Stairs)  1, backwards with use of walker  -MS     Ascending Technique (Stairs)  step-to-step  -MS     Descending Technique (Stairs)  step-to-step  -MS     Row Name 07/14/20 1058          Mobility Assessment/Intervention    Extremity Weight-bearing Status  right lower extremity  -MS     Right Lower Extremity (Weight-bearing Status)  weight-bearing as tolerated (WBAT)  -MS       User Key  (r) = Recorded By, (t) = Taken By, (c) = Cosigned By    Initials Name Provider Type    Hammad Ritchie L, PT Physical Therapist        Obj/Interventions     Row Name 07/14/20 1059          General ROM    GENERAL ROM COMMENTS  BUE/LLE (WFL's); Right knee AROM (5, 110)  -MS     Row Name 07/14/20 1059          MMT (Manual Muscle Testing)    General MMT Comments  BUE/LLE (3+/5)  -MS     Row Name 07/14/20 1059          Therapeutic Exercise    Comment (Therapeutic Exercise)  Right TKR ther. ex. program x 10 reps completed (Pt. independent with all ther. ex. )  -MS       User Key  (r) = Recorded By, (t) = Taken By, (c) = Cosigned By    Initials Name Provider Type    Hammad Ritchie  L, PT Physical Therapist        Goals/Plan    No documentation.       Clinical Impression     Row Name 07/14/20 1059          Pain Assessment    Additional Documentation  Pain Scale: Numbers Pre/Post-Treatment (Group)  -MS     Row Name 07/14/20 1059          Pain Scale: Numbers Pre/Post-Treatment    Pain Scale: Numbers, Pretreatment  3/10  -MS     Pain Scale: Numbers, Post-Treatment  3/10  -MS     Pain Location - Side  Right  -MS     Pain Location  knee  -MS     Row Name 07/14/20 1059          Plan of Care Review    Plan of Care Reviewed With  patient  -MS     Row Name 07/14/20 1059          Positioning and Restraints    Pre-Treatment Position  in bed  -MS     Post Treatment Position  bed  -MS     In Bed  notified nsg;supine;call light within reach;encouraged to call for assist;exit alarm on Ice pack to Right knee.   -MS       User Key  (r) = Recorded By, (t) = Taken By, (c) = Cosigned By    Initials Name Provider Type    Hammad Ritchie, PT Physical Therapist        Outcome Measures     Row Name 07/14/20 1100          How much help from another person do you currently need...    Turning from your back to your side while in flat bed without using bedrails?  4  -MS     Moving from lying on back to sitting on the side of a flat bed without bedrails?  4  -MS     Moving to and from a bed to a chair (including a wheelchair)?  4  -MS     Standing up from a chair using your arms (e.g., wheelchair, bedside chair)?  4  -MS     Climbing 3-5 steps with a railing?  3  -MS     To walk in hospital room?  4  -MS     AM-PAC 6 Clicks Score (PT)  23  -MS     Row Name 07/14/20 1100          Functional Assessment    Outcome Measure Options  AM-PAC 6 Clicks Basic Mobility (PT)  -MS       User Key  (r) = Recorded By, (t) = Taken By, (c) = Cosigned By    Initials Name Provider Type    Hammad Ritchie, PT Physical Therapist        Physical Therapy Education                 Title: PT OT SLP Therapies (Done)     Topic: Physical  Therapy (Done)     Point: Mobility training (Done)     Description:   Instruct learner(s) on safety and technique for assisting patient out of bed, chair or wheelchair.  Instruct in the proper use of assistive devices, such as walker, crutches, cane or brace.              Patient Friendly Description:   It's important to get you on your feet again, but we need to do so in a way that is safe for you. Falling has serious consequences, and your personal safety is the most important thing of all.        When it's time to get out of bed, one of us or a family member will sit next to you on the bed to give you support.     If your doctor or nurse tells you to use a walker, crutches, a cane, or a brace, be sure you use it every time you get out of bed, even if you think you don't need it.    Learning Progress Summary           Patient Acceptance, E,D, VU,DU by MS at 7/14/2020 1101                   Point: Home exercise program (Done)     Description:   Instruct learner(s) on appropriate technique for monitoring, assisting and/or progressing patient with therapeutic exercises and activities.              Learning Progress Summary           Patient Acceptance, E,D, VU,DU by MS at 7/14/2020 1101                   Point: Body mechanics (Done)     Description:   Instruct learner(s) on proper positioning and spine alignment for patient and/or caregiver during mobility tasks and/or exercises.              Learning Progress Summary           Patient Acceptance, E,D, VU,DU by MS at 7/14/2020 1101                   Point: Precautions (Done)     Description:   Instruct learner(s) on prescribed precautions during mobility and gait tasks              Learning Progress Summary           Patient Acceptance, E,D, VU,DU by MS at 7/14/2020 1101                               User Key     Initials Effective Dates Name Provider Type Discipline    MS 04/03/18 -  Hammad Dickens, PT Physical Therapist PT              PT Recommendation and  Plan     Outcome Summary/Treatment Plan (PT)  Anticipated Equipment Needs at Discharge (PT): (Pt. reports she already owns a Rwx for home use. )  Anticipated Discharge Disposition (PT): home with assist, home with home health  Plan of Care Reviewed With: patient  Outcome Summary: Pt. has currently met all inpt. P.T. goals and is independent with general, functional mobility.  Plan is to discharge home with HHPT.  Nursing notified.     Time Calculation:   PT Charges     Row Name 07/14/20 1102             Time Calculation    Start Time  0940  -MS      Stop Time  1000  -MS      Time Calculation (min)  20 min  -MS      PT Received On  07/14/20  -MS         Time Calculation- PT    Total Timed Code Minutes- PT  18 minute(s)  -MS        User Key  (r) = Recorded By, (t) = Taken By, (c) = Cosigned By    Initials Name Provider Type    Hammad Ritchie, PT Physical Therapist        Therapy Charges for Today     Code Description Service Date Service Provider Modifiers Qty    57818843395 HC PT EVAL LOW COMPLEXITY 1 7/14/2020 Hammad Dickens, PT GP 1    43366127814 HC PT THER PROC EA 15 MIN 7/14/2020 Hammad Dickens, PT GP 1          PT G-Codes  Outcome Measure Options: AM-PAC 6 Clicks Basic Mobility (PT)  AM-PAC 6 Clicks Score (PT): 23    PT Discharge Summary  Anticipated Discharge Disposition (PT): home with assist, home with home health  Reason for Discharge: All goals achieved, Discharge from facility  Outcomes Achieved: Refer to plan of care for updates on goals achieved  Discharge Destination: Home with assist, Home with home health    Hammad Dickens, PT  7/14/2020

## 2020-07-15 VITALS
OXYGEN SATURATION: 93 % | RESPIRATION RATE: 16 BRPM | WEIGHT: 141.54 LBS | BODY MASS INDEX: 25.08 KG/M2 | DIASTOLIC BLOOD PRESSURE: 53 MMHG | SYSTOLIC BLOOD PRESSURE: 109 MMHG | HEIGHT: 63 IN | HEART RATE: 68 BPM | TEMPERATURE: 97.1 F

## 2020-07-15 LAB
HCT VFR BLD AUTO: 28.7 % (ref 34–46.6)
HGB BLD-MCNC: 9.2 G/DL (ref 12–15.9)

## 2020-07-15 PROCEDURE — 85014 HEMATOCRIT: CPT | Performed by: NURSE PRACTITIONER

## 2020-07-15 PROCEDURE — 85018 HEMOGLOBIN: CPT | Performed by: NURSE PRACTITIONER

## 2020-07-15 RX ADMIN — HYDROCODONE BITARTRATE AND ACETAMINOPHEN 2 TABLET: 7.5; 325 TABLET ORAL at 06:11

## 2020-07-15 RX ADMIN — ASPIRIN 81 MG: 81 TABLET, COATED ORAL at 07:36

## 2020-07-15 RX ADMIN — DESVENLAFAXINE SUCCINATE 50 MG: 50 TABLET, EXTENDED RELEASE ORAL at 07:36

## 2020-07-15 RX ADMIN — MELOXICAM 7.5 MG: 7.5 TABLET ORAL at 07:36

## 2020-07-15 RX ADMIN — POLYETHYLENE GLYCOL 3350 17 G: 17 POWDER, FOR SOLUTION ORAL at 07:36

## 2020-07-15 RX ADMIN — PANTOPRAZOLE SODIUM 40 MG: 40 TABLET, DELAYED RELEASE ORAL at 06:12

## 2020-07-15 RX ADMIN — HYDROCODONE BITARTRATE AND ACETAMINOPHEN 2 TABLET: 7.5; 325 TABLET ORAL at 02:02

## 2020-07-15 NOTE — PLAN OF CARE
Problem: Patient Care Overview  Goal: Plan of Care Review  Outcome: Ongoing (interventions implemented as appropriate)  Flowsheets (Taken 7/15/2020 0142)  Progress: improving  Plan of Care Reviewed With: patient  Outcome Summary: POD 1 RTKA. VSS< pain well controlled with prn norco, denies any GI upset. ambulates very well with walker, stand by assist  and gait belt. plans to d/c home with  7/15. will continue to monitor.  Goal: Individualization and Mutuality  Outcome: Ongoing (interventions implemented as appropriate)  Goal: Discharge Needs Assessment  Outcome: Ongoing (interventions implemented as appropriate)  Goal: Interprofessional Rounds/Family Conf  Outcome: Ongoing (interventions implemented as appropriate)     Problem: Pain, Chronic (Adult)  Goal: Acceptable Pain/Comfort Level and Functional Ability  Outcome: Ongoing (interventions implemented as appropriate)  Flowsheets (Taken 7/15/2020 0142)  Acceptable Pain/Comfort Level and Functional Ability: making progress toward outcome     Problem: Fall Risk (Adult)  Goal: Absence of Fall  Outcome: Ongoing (interventions implemented as appropriate)  Flowsheets (Taken 7/15/2020 0142)  Absence of Fall: achieves outcome     Problem: Knee Arthroplasty (Total, Partial) (Adult)  Goal: Signs and Symptoms of Listed Potential Problems Will be Absent, Minimized or Managed (Knee Arthroplasty)  Outcome: Ongoing (interventions implemented as appropriate)  Flowsheets (Taken 7/15/2020 0142)  Problems Assessed (Knee Arthroplasty): all  Problems Present (Knee Arthroplasty): pain  Goal: Anesthesia/Sedation Recovery  Outcome: Ongoing (interventions implemented as appropriate)  Flowsheets (Taken 7/15/2020 0142)  Anesthesia/Sedation Recovery: recovered to baseline; criteria met for discharge

## 2020-07-15 NOTE — PLAN OF CARE
Problem: Patient Care Overview  Goal: Plan of Care Review  Outcome: Ongoing (interventions implemented as appropriate)  Flowsheets  Taken 7/15/2020 0142 by Beni Perez, RN  Progress: improving  Plan of Care Reviewed With: patient  Taken 7/15/2020 1212 by Gisell Lujan, RN  Outcome Summary: Pain controlled with PO pain medication. Ambulates with assist x1 and walker. VSS. Voiding without difficulty. DC home with home health today. educated on BP monitoring.

## 2020-07-15 NOTE — PROGRESS NOTES
Orthopedic Progress Note      Patient: Lizzie Bose    YOB: 1945    Medical Record Number: [id number]    Attending Physician: Krish Ramirez MD    Date of Admission: 7/13/2020  9:59 AM    Admitting Dx:  Primary osteoarthritis of right knee [M17.11]  OA (osteoarthritis) of knee [M17.10]    Status Post: TOTAL KNEE ARTHROPLASTY    Post Operative Day Number: 2    Current Problem List:   Patient Active Problem List   Diagnosis   • PAT (obstructive sleep apnea)   • Lumbar pain   • OA (osteoarthritis) of knee   • Primary osteoarthritis of right knee         Past Medical History:   Diagnosis Date   • ADD (attention deficit disorder)    • Arthritis    • Depression    • High blood pressure    • History of Clostridioides difficile infection    • Hyperlipidemia    • Low back pain    • Right knee pain    • Sleep apnea     NO USING MACHINE       Current Medications:  Scheduled Meds:  amLODIPine-valsartan (EXFORGE) 5-160 mg combo dose  Oral Daily   aspirin 81 mg Oral Q12H   desvenlafaxine 50 mg Oral Daily   meloxicam 7.5 mg Oral Daily   metoprolol succinate XL 50 mg Oral Daily   pantoprazole 40 mg Oral QAM   polyethylene glycol 17 g Oral BID     PRN Meds:.HYDROcodone-acetaminophen  •  HYDROcodone-acetaminophen  •  melatonin  •  ondansetron **OR** ondansetron    SUBJECTIVE: 75 y.o.  female.  Awake and alert.  No complaints.    OBJECTIVE:   Vitals:    07/14/20 1915 07/14/20 2240 07/15/20 0245 07/15/20 0700   BP: 108/62 90/53 91/57 109/53   BP Location: Right arm Left arm Left arm Left arm   Patient Position: Sitting Lying Lying Lying   Pulse: 60 66 62 68   Resp: 16 16 16 16   Temp: 97.5 °F (36.4 °C) 98 °F (36.7 °C) 97.7 °F (36.5 °C) 97.1 °F (36.2 °C)   TempSrc: Skin Skin Skin Skin   SpO2: 95% 94% 96% 93%   Weight:       Height:         I/O last 3 completed shifts:  In: 1160 [P.O.:960; IV Piggyback:200]  Out: 650 [Urine:400; Drains:250]    Diagnostic Tests:   Lab Results (last 24 hours)     Procedure Component  Value Units Date/Time    Hemoglobin & Hematocrit, Blood [[id number]]  (Abnormal) Collected:  07/15/20 0352    Specimen:  Blood Updated:  07/15/20 0430     Hemoglobin 9.2 g/dL      Hematocrit 28.7 %           PHYSICAL EXAM: Right knee Yrn dressing remains dry and intact.  Calf is soft and nontender.  Has good motion sensation to her foot and ankle.  Pain well controlled.  Patient is voiding well.  Hemoglobin stable at 9.2.     ASSESSMENT & PLAN:  Patient is decided to go home with home health rather than to skilled nursing facility.  Aspirin for DVT prophylaxis.  Return to the office in 2 weeks to see Dr. Ramirez    Date: 7/15/2020    Lola Zeng RN

## 2020-07-20 ENCOUNTER — TELEPHONE (OUTPATIENT)
Dept: ORTHOPEDIC SURGERY | Facility: CLINIC | Age: 75
End: 2020-07-20

## 2020-07-20 NOTE — TELEPHONE ENCOUNTER
FYI:FAHAD FROM [facility] HAS TRIED ALL DAY TO SEE AND TALK TO THIS PATIENT. SHE IS NOT ANSWERING HER PHONE OR HER DOOR. SHE SAW HER THURS AND FRI LAST WEEK AND HAD AN APT TO SEE HER TODAY. JUST WANTED TO GIVE YOU AN FYI ON WHAT IS GOING ON. I ALSO LEFT MESSAGES ON BOTH HER PHONES TO CALL US BACK.

## 2020-07-22 DIAGNOSIS — M17.11 PRIMARY OSTEOARTHRITIS OF RIGHT KNEE: ICD-10-CM

## 2020-07-23 RX ORDER — HYDROCODONE BITARTRATE AND ACETAMINOPHEN 7.5; 325 MG/1; MG/1
TABLET ORAL
Qty: 60 TABLET | Refills: 0 | Status: SHIPPED | OUTPATIENT
Start: 2020-07-23

## 2020-07-23 RX ORDER — ONDANSETRON 4 MG/1
4 TABLET, FILM COATED ORAL EVERY 6 HOURS PRN
Qty: 10 TABLET | Refills: 0 | Status: SHIPPED | OUTPATIENT
Start: 2020-07-23 | End: 2020-08-02

## 2020-07-27 DIAGNOSIS — Z96.651 S/P TKR (TOTAL KNEE REPLACEMENT), RIGHT: Primary | ICD-10-CM

## 2020-07-27 DIAGNOSIS — M17.11 PRIMARY OSTEOARTHRITIS OF RIGHT KNEE: ICD-10-CM

## 2020-07-30 ENCOUNTER — OFFICE VISIT (OUTPATIENT)
Dept: ORTHOPEDIC SURGERY | Facility: CLINIC | Age: 75
End: 2020-07-30

## 2020-07-30 VITALS — WEIGHT: 140 LBS | TEMPERATURE: 97 F | HEIGHT: 65 IN | BODY MASS INDEX: 23.32 KG/M2

## 2020-07-30 DIAGNOSIS — Z96.651 STATUS POST RIGHT KNEE REPLACEMENT: Primary | ICD-10-CM

## 2020-07-30 DIAGNOSIS — M17.12 PRIMARY OSTEOARTHRITIS OF LEFT KNEE: ICD-10-CM

## 2020-07-30 PROCEDURE — 99024 POSTOP FOLLOW-UP VISIT: CPT | Performed by: ORTHOPAEDIC SURGERY

## 2020-07-30 NOTE — PROGRESS NOTES
Lizzie Bose : 1945 MRN: [id number] DATE: 2020    DIAGNOSIS: 2 week follow up right total knee, knee pain - advanced OA      SUBJECTIVE:Patient returns today for 2 week follow up of right total knee replacement. Patient reports doing well with no unusual complaints. Appears to be progressing appropriately. Left knee -  Severe medial OA-  Limiting activities    OBJECTIVE:   Exam:. The incision is healing appropriately. No sign of infection. Range of motion is progressing as expected. The calf is soft and nontender with a negative Homans sign.  Knee:  left    ALIGNMENT:     Varus  ,   Patella  tracks  midline    GAIT:    Antalgic    SKIN:    No abnormality    RANGE OF MOTION:   3  -  120   DEG    STRENGTH:   4  / 5    LIGAMENTS:    No varus / valgus instability.   Negative  Lachman.    MENISCUS:     Negative   Jani       DISTAL PULSES:    Paplable    DISTAL SENSATION :   Intact    LYMPHATICS:     No   lymphadenopathy    OTHER:          - Positive   effusion      - Crepitance with ROM     ASSESSMENT: 2 week status post right knee replacement. Left knee advanced OA    PLAN: 1) Staples removed and steri strips applied   2) Order given for PT   3) Discontinue JAVIER hose   4) Continue ice PRN   5) aspirin 81 mg orally every day for 1 month   6) Follow up in 6 weeks with repeat Xrays of right knee (3views)   7) will reasess left knee at that time - TKA vs injection    Krish Ramirez MD  2020

## 2020-08-06 RX ORDER — ASPIRIN 81 MG/1
TABLET ORAL
Qty: 60 TABLET | Refills: 0 | Status: SHIPPED | OUTPATIENT
Start: 2020-08-06 | End: 2020-09-01

## 2020-08-11 ENCOUNTER — TELEPHONE (OUTPATIENT)
Dept: INTERNAL MEDICINE | Facility: CLINIC | Age: 75
End: 2020-08-11

## 2020-08-11 NOTE — TELEPHONE ENCOUNTER
Patient called in and is needing a refill for   zolpidem (AMBIEN) 10 MG tablet      Please send to pharmacy   [facility]/pharmacy #[id number] - [place], [place] - [address] AT [address] [place] - [phone]  - [phone] FX Phone:  [phone] Fax:  [phone]    Address:  [address], [place] [address]            If any questions, please call [phone]

## 2020-09-01 RX ORDER — ASPIRIN 81 MG/1
TABLET ORAL
Qty: 60 TABLET | Refills: 0 | Status: SHIPPED | OUTPATIENT
Start: 2020-09-01

## 2020-09-03 DIAGNOSIS — E55.9 VITAMIN D DEFICIENCY: ICD-10-CM

## 2020-09-03 DIAGNOSIS — I10 HTN (HYPERTENSION), BENIGN: ICD-10-CM

## 2020-09-03 DIAGNOSIS — E78.5 HYPERLIPIDEMIA, UNSPECIFIED HYPERLIPIDEMIA TYPE: ICD-10-CM

## 2020-09-03 DIAGNOSIS — F51.04 CHRONIC INSOMNIA: Primary | ICD-10-CM

## 2020-09-03 DIAGNOSIS — G47.33 OSA (OBSTRUCTIVE SLEEP APNEA): ICD-10-CM

## 2020-09-03 DIAGNOSIS — G47.33 OSA (OBSTRUCTIVE SLEEP APNEA): Primary | ICD-10-CM

## 2020-09-03 RX ORDER — ZOLPIDEM TARTRATE 10 MG/1
10 TABLET ORAL NIGHTLY PRN
Qty: 30 TABLET | Refills: 0 | Status: SHIPPED | OUTPATIENT
Start: 2020-09-03 | End: 2020-10-08

## 2020-09-04 PROBLEM — E55.9 VITAMIN D DEFICIENCY: Status: ACTIVE | Noted: 2020-09-04

## 2020-09-04 PROBLEM — I10 BENIGN ESSENTIAL HYPERTENSION: Status: ACTIVE | Noted: 2020-09-04

## 2020-09-04 PROBLEM — E78.2 MIXED HYPERLIPIDEMIA: Status: ACTIVE | Noted: 2020-09-04

## 2020-09-10 PROBLEM — E16.1 HYPERINSULINISM: Status: ACTIVE | Noted: 2020-09-10

## 2020-09-10 PROBLEM — E03.9 ACQUIRED HYPOTHYROIDISM: Status: ACTIVE | Noted: 2020-09-10

## 2020-09-10 PROBLEM — M17.12 PRIMARY OSTEOARTHRITIS OF LEFT KNEE: Status: ACTIVE | Noted: 2020-02-11

## 2020-09-10 PROBLEM — F51.04 CHRONIC INSOMNIA: Status: ACTIVE | Noted: 2020-09-10

## 2020-09-10 PROBLEM — F90.2 ATTENTION DEFICIT HYPERACTIVITY DISORDER (ADHD), COMBINED TYPE: Status: ACTIVE | Noted: 2020-09-10

## 2020-09-10 PROBLEM — M81.0 AGE-RELATED OSTEOPOROSIS WITHOUT CURRENT PATHOLOGICAL FRACTURE: Status: ACTIVE | Noted: 2020-09-10

## 2020-09-10 LAB
25(OH)D3+25(OH)D2 SERPL-MCNC: 43.9 NG/ML (ref 30–100)
ALBUMIN SERPL-MCNC: 4.1 G/DL (ref 3.7–4.7)
ALBUMIN/GLOB SERPL: 1.3 {RATIO} (ref 1.2–2.2)
ALP SERPL-CCNC: 80 IU/L (ref 39–117)
ALT SERPL-CCNC: 17 IU/L (ref 0–32)
AST SERPL-CCNC: 30 IU/L (ref 0–40)
BASOPHILS # BLD AUTO: 0 X10E3/UL (ref 0–0.2)
BASOPHILS NFR BLD AUTO: 1 %
BILIRUB SERPL-MCNC: 0.2 MG/DL (ref 0–1.2)
BUN SERPL-MCNC: 20 MG/DL (ref 8–27)
BUN/CREAT SERPL: 27 (ref 12–28)
CALCIUM SERPL-MCNC: 9.7 MG/DL (ref 8.7–10.3)
CHLORIDE SERPL-SCNC: 104 MMOL/L (ref 96–106)
CHOLEST SERPL-MCNC: 228 MG/DL (ref 100–199)
CO2 SERPL-SCNC: 24 MMOL/L (ref 20–29)
CREAT SERPL-MCNC: 0.74 MG/DL (ref 0.57–1)
EOSINOPHIL # BLD AUTO: 0.2 X10E3/UL (ref 0–0.4)
EOSINOPHIL NFR BLD AUTO: 5 %
ERYTHROCYTE [DISTWIDTH] IN BLOOD BY AUTOMATED COUNT: 15.6 % (ref 11.7–15.4)
GLOBULIN SER CALC-MCNC: 3.2 G/DL (ref 1.5–4.5)
GLUCOSE SERPL-MCNC: 111 MG/DL (ref 65–99)
HCT VFR BLD AUTO: 35.7 % (ref 34–46.6)
HDLC SERPL-MCNC: 54 MG/DL
HGB BLD-MCNC: 11 G/DL (ref 11.1–15.9)
IMM GRANULOCYTES # BLD AUTO: 0 X10E3/UL (ref 0–0.1)
IMM GRANULOCYTES NFR BLD AUTO: 0 %
LDLC SERPL CALC-MCNC: 136 MG/DL (ref 0–99)
LDLC/HDLC SERPL: 2.5 RATIO (ref 0–3.2)
LYMPHOCYTES # BLD AUTO: 1.5 X10E3/UL (ref 0.7–3.1)
LYMPHOCYTES NFR BLD AUTO: 31 %
MCH RBC QN AUTO: 25.6 PG (ref 26.6–33)
MCHC RBC AUTO-ENTMCNC: 30.8 G/DL (ref 31.5–35.7)
MCV RBC AUTO: 83 FL (ref 79–97)
MONOCYTES # BLD AUTO: 0.5 X10E3/UL (ref 0.1–0.9)
MONOCYTES NFR BLD AUTO: 11 %
NEUTROPHILS # BLD AUTO: 2.5 X10E3/UL (ref 1.4–7)
NEUTROPHILS NFR BLD AUTO: 52 %
PLATELET # BLD AUTO: 418 X10E3/UL (ref 150–450)
POTASSIUM SERPL-SCNC: 4.9 MMOL/L (ref 3.5–5.2)
PROT SERPL-MCNC: 7.3 G/DL (ref 6–8.5)
RBC # BLD AUTO: 4.3 X10E6/UL (ref 3.77–5.28)
SODIUM SERPL-SCNC: 141 MMOL/L (ref 134–144)
TRIGL SERPL-MCNC: 213 MG/DL (ref 0–149)
VLDLC SERPL CALC-MCNC: 38 MG/DL (ref 5–40)
WBC # BLD AUTO: 4.9 X10E3/UL (ref 3.4–10.8)

## 2020-09-15 ENCOUNTER — OFFICE VISIT (OUTPATIENT)
Dept: INTERNAL MEDICINE | Facility: CLINIC | Age: 75
End: 2020-09-15

## 2020-09-15 VITALS
DIASTOLIC BLOOD PRESSURE: 60 MMHG | HEIGHT: 65 IN | BODY MASS INDEX: 24.32 KG/M2 | WEIGHT: 146 LBS | SYSTOLIC BLOOD PRESSURE: 112 MMHG | HEART RATE: 80 BPM

## 2020-09-15 DIAGNOSIS — E16.1 HYPERINSULINISM: ICD-10-CM

## 2020-09-15 DIAGNOSIS — E55.9 VITAMIN D DEFICIENCY: ICD-10-CM

## 2020-09-15 DIAGNOSIS — R06.09 DYSPNEA ON EXERTION: ICD-10-CM

## 2020-09-15 DIAGNOSIS — I10 BENIGN ESSENTIAL HYPERTENSION: ICD-10-CM

## 2020-09-15 DIAGNOSIS — F51.04 CHRONIC INSOMNIA: ICD-10-CM

## 2020-09-15 DIAGNOSIS — E78.2 MIXED HYPERLIPIDEMIA: ICD-10-CM

## 2020-09-15 DIAGNOSIS — M81.0 AGE-RELATED OSTEOPOROSIS WITHOUT CURRENT PATHOLOGICAL FRACTURE: ICD-10-CM

## 2020-09-15 DIAGNOSIS — G47.33 OSA (OBSTRUCTIVE SLEEP APNEA): Primary | ICD-10-CM

## 2020-09-15 LAB — HBA1C MFR BLD: 5.9 %

## 2020-09-15 PROCEDURE — 83036 HEMOGLOBIN GLYCOSYLATED A1C: CPT | Performed by: INTERNAL MEDICINE

## 2020-09-15 PROCEDURE — 99214 OFFICE O/P EST MOD 30 MIN: CPT | Performed by: INTERNAL MEDICINE

## 2020-09-15 RX ORDER — ZOLPIDEM TARTRATE 5 MG/1
5 TABLET ORAL NIGHTLY PRN
COMMUNITY
End: 2020-10-08 | Stop reason: SDUPTHER

## 2020-09-15 NOTE — PROGRESS NOTES
Subjective   Lizzie Bose is a 75 y.o. female.     No chief complaint on file.      History of Present Illness   This is a 75-year-old female with a past medical history significant for benign essential hypertension, hyperlipidemia, obstructive sleep apnea, osteoporosis, hyperinsulinism, chronic insomnia, attention deficit hyperactivity disorder that is currently being followed by psychiatry, osteoarthritis, status post left total knee replacement in August 2011, status post appendectomy in 1953, history of C. difficile colitis, status post bilateral cataract implants, status post bone loss in the jaw which required bone graft for implants who presents today for a routine follow-up on labs and medications.  The patient is currently not experiencing any side effects or problems with her current medications.  The patient is status post right total knee replacement in July 2020.  She is currently in physical therapy 2 times per week and doing well with her therapy.  The patient states that she is easily short of breath with minimal exertion even when getting in and out of a car.  She states that she is currently not using her CPAP but does have this available at home.  Depression has remained relatively stable on her current dose of Pristiq for which she follows with psychiatry-Dr. Langley.  Her psychiatrist is also currently managing her ADD.  Patient is due for a DEXA scan and we did discuss calcium in the diet as well as a calcium and vitamin D supplement.  She does have Ambien on hand but uses this very infrequently and has not noted any side effects or problems with the use of Ambien.  No other new voiced complaints.    The following portions of the patient's history were reviewed and updated as appropriate: allergies, current medications, past family history, past medical history, past social history, past surgical history and problem list.    Depression Screen:  No flowsheet data found.    Assessment/Plan    Diagnoses and all orders for this visit:    PAT (obstructive sleep apnea)    Benign essential hypertension    Mixed hyperlipidemia    Vitamin D deficiency    Age-related osteoporosis without current pathological fracture    Hyperinsulinism    Chronic insomnia    #1.  Benign essential hypertension-blood pressure is well controlled, would recommend that we continue current treatment.  2.  Hyperlipidemia-LDL goal less than 130, total cholesterol 228, HDL 54, triglycerides 213 and , recommend diet and exercise, specifically decreasing saturated fats and trans-fats in the diet and increasing exercise as tolerated.  3.  Obstructive sleep apnea-currently not using her CPAP, we did discuss the need to resume its use and the increased risk of noncompliance with the CPAP, this noncompliance can include problems with hypertension, pulmonary hypertension, cardiac arrhythmias as well as cognitive issues.  4.  Depression-patient is currently on Pristiq for which she follows with her psychiatrist, Dr. Langley, currently stable.  5.  Attention deficit hyperactivity disorder-currently following with psychiatry, Dr. Langley.  6.  Osteoporosis-patient is due for a DEXA scan, we did discuss the importance of calcium in the diet as well as calcium and vitamin D supplements as indicated and the importance of weightbearing exercise, would recommend that a DEXA scan be rechecked in 2 years.  7.  Hyperinsulinism-fasting blood sugar was 111, will check an A1c today, would recommend continued decrease sweets sugars and carbohydrates.  A1c was 5.9  8.  Chronic insomnia-currently using Ambien 5 mg 1 tablet p.o. nightly as needed for insomnia, patient has tolerated this without any difficulties.  The patient is stable on the current medication, there have been no harmful side effects or inappropriate use.  The patient was educated on side effects, physical dependence, addiction, pregnancy, risk of overdose, storage, disposal of  medications and the proper use of the medication.  Information on the medication was given to the patient.  YE has been queried.  Contract has been signed.  9.  Vitamin D deficiency-level is 43.9, continue current treatment with vitamin D3.  #10.  Dyspnea on exertion-we will check a 2D echocardiogram, CBC was reviewed and only showed very mild anemia which would be consistent with her recent surgical procedure.    Follow-up in 6 months for a Medicare wellness with lab-CMP, CBC, lipid, vitamin D, A1c         Past Medical History:   Diagnosis Date   • ADD (attention deficit disorder)    • Arthritis    • Depression    • High blood pressure    • History of Clostridioides difficile infection    • Hyperlipidemia    • Low back pain    • Right knee pain    • Sleep apnea     NO USING MACHINE       Past Surgical History:   Procedure Laterality Date   • APPENDECTOMY     • CARPAL TUNNEL RELEASE Right    • CATARACT EXTRACTION Bilateral    • HAMMER TOE REPAIR Left    • HIP ARTHROPLASTY Left    • TOTAL KNEE ARTHROPLASTY Right 7/13/2020    Procedure: TOTAL KNEE ARTHROPLASTY;  Surgeon: Krish Ramirez MD;  Location: [facility];  Service: Orthopedics;  Laterality: Right;       Family History   Problem Relation Age of Onset   • Diabetes Brother    • Hypertension Brother    • Clotting disorder Son    • Malig Hyperthermia Neg Hx        Social History     Socioeconomic History   • Marital status:      Spouse name: Not on file   • Number of children: Not on file   • Years of education: Not on file   • Highest education level: Not on file   Tobacco Use   • Smoking status: Never Smoker   • Smokeless tobacco: Never Used   Substance and Sexual Activity   • Alcohol use: No   • Drug use: Never       Current Outpatient Medications   Medication Sig Dispense Refill   • amLODIPine-valsartan (EXFORGE) 5-160 MG per tablet Take 1 tablet by mouth Daily.     • amphetamine-dextroamphetamine (ADDERALL) 10 MG tablet HOLD PRIOR TO SURG  0   •  ASPIRIN 81 MG EC tablet TAKE 1 TABLET BY MOUTH EVERY 12 (TWELVE) HOURS FOR 60 DOSES. 60 tablet 0   • cholecalciferol (VITAMIN D3) 25 MCG (1000 UT) tablet Take 1,000 Units by mouth Daily.     • ezetimibe (ZETIA) 10 MG tablet Take 10 mg by mouth Every Night.     • HYDROcodone-acetaminophen (NORCO) 7.5-325 MG per tablet 1-2 po q 4-6 hr prn pain 60 tablet 0   • meloxicam (MOBIC) 15 MG tablet Take 15 mg by mouth Daily. PRN/INSTRUCTED PT TO FOLLOW MD INSTRUCTIONS REGARDING HOLDING FOR SURGERY     • metoprolol succinate XL (TOPROL-XL) 100 MG 24 hr tablet Take 50 mg by mouth Daily.     • PRISTIQ 50 MG 24 hr tablet Take 50 mg by mouth Daily.     • zolpidem (AMBIEN) 10 MG tablet Take 1 tablet by mouth At Night As Needed for Sleep. 30 tablet 0     No current facility-administered medications for this visit.      Facility-Administered Medications Ordered in Other Visits   Medication Dose Route Frequency Provider Last Rate Last Dose   • Chlorhexidine Gluconate 2 % pads 2 each  2 pad Apply externally BID Krish Ramirez MD           Review of Systems    Objective   There were no vitals taken for this visit.    Physical Exam  Constitutional:  Patient appears well-developed, well-nourished and in no acute distress.  Neck:  The neck is supple and symmetric.  The trachea is midline with no masses.  Exam of the thyroid reveals no thyromegaly or masses.  Pulmonary:  Respiratory effort is normal with no increased work of breathing or respiratory distress.  Lungs are clear to auscultation bilaterally.  Cardiovascular:  Auscultation of the heart rate and rhythm is normal.  S1 and S2 are normal without rubs, gallops or murmurs.  Carotid pulses are 2+ bilaterally without bruit.  Examination of the extremities reveals no edema.  Neurologic:  Cranial nerves II-XII are grossly intact.  Psychiatric:  Patient's judgment and insight are unimpaired.  Patient's mood and affect are normal.      Recent Results (from the past 2016 hour(s))   Basic  Metabolic Panel    Collection Time: 07/10/20  4:27 PM    Specimen: Blood   Result Value Ref Range    Glucose 110 (H) 65 - 99 mg/dL    BUN 29 (H) 8 - 23 mg/dL    Creatinine 0.80 0.57 - 1.00 mg/dL    Sodium 137 136 - 145 mmol/L    Potassium 4.4 3.5 - 5.2 mmol/L    Chloride 104 98 - 107 mmol/L    CO2 20.0 (L) 22.0 - 29.0 mmol/L    Calcium 9.8 8.6 - 10.5 mg/dL    eGFR Non African Amer 70 >60 mL/min/1.73    BUN/Creatinine Ratio 36.3 (H) 7.0 - 25.0    Anion Gap 13.0 5.0 - 15.0 mmol/L   CBC (No Diff)    Collection Time: 07/10/20  4:27 PM    Specimen: Blood   Result Value Ref Range    WBC 6.11 3.40 - 10.80 10*3/mm3    RBC 4.23 3.77 - 5.28 10*6/mm3    Hemoglobin 11.5 (L) 12.0 - 15.9 g/dL    Hematocrit 35.0 34.0 - 46.6 %    MCV 82.7 79.0 - 97.0 fL    MCH 27.2 26.6 - 33.0 pg    MCHC 32.9 31.5 - 35.7 g/dL    RDW 14.7 12.3 - 15.4 %    RDW-SD 44.7 37.0 - 54.0 fl    MPV 9.2 6.0 - 12.0 fL    Platelets 350 140 - 450 10*3/mm3   Urinalysis With Culture If Indicated - Urine, Random Void    Collection Time: 07/10/20  4:27 PM    Specimen: Urine, Random Void   Result Value Ref Range    Color, UA Yellow Yellow, Straw    Appearance, UA Clear Clear    pH, UA <=5.0 5.0 - 8.0    Specific Gravity, UA 1.022 1.005 - 1.030    Glucose, UA Negative Negative    Ketones, UA Negative Negative    Bilirubin, UA Negative Negative    Blood, UA Negative Negative    Protein, UA Negative Negative    Leuk Esterase, UA Negative Negative    Nitrite, UA Negative Negative    Urobilinogen, UA 0.2 E.U./dL 0.2 - 1.0 E.U./dL   COVID-19,BIOTAP, NP/OP SWAB IN TRANSPORT MEDIA OR SALINE 24-36 HR TAT - Swab, Nasopharynx    Collection Time: 07/10/20  4:55 PM    Specimen: Nasopharynx; Swab   Result Value Ref Range    Reference Lab Report      COVID19 Not Detected Not Detected - Ref. Range   Hemoglobin & Hematocrit, Blood    Collection Time: 07/14/20  4:27 AM    Specimen: Blood   Result Value Ref Range    Hemoglobin 9.5 (L) 12.0 - 15.9 g/dL    Hematocrit 28.9 (L) 34.0 - 46.6  %   Hemoglobin & Hematocrit, Blood    Collection Time: 07/15/20  3:52 AM    Specimen: Blood   Result Value Ref Range    Hemoglobin 9.2 (L) 12.0 - 15.9 g/dL    Hematocrit 28.7 (L) 34.0 - 46.6 %   Vitamin D 25 hydroxy    Collection Time: 09/09/20  1:09 PM    Specimen: Blood   Result Value Ref Range    25 Hydroxy, Vitamin D 43.9 30.0 - 100.0 ng/mL   Lipid Panel With LDL / HDL Ratio    Collection Time: 09/09/20  1:09 PM    Specimen: Blood   Result Value Ref Range    Total Cholesterol 228 (H) 100 - 199 mg/dL    Triglycerides 213 (H) 0 - 149 mg/dL    HDL Cholesterol 54 >39 mg/dL    VLDL Cholesterol Domenic 38 5 - 40 mg/dL    LDL Chol Calc (NIH) 136 (H) 0 - 99 mg/dL    LDL/HDL RATIO 2.5 0.0 - 3.2 ratio   Comprehensive Metabolic Panel    Collection Time: 09/09/20  1:09 PM    Specimen: Blood   Result Value Ref Range    Glucose 111 (H) 65 - 99 mg/dL    BUN 20 8 - 27 mg/dL    Creatinine 0.74 0.57 - 1.00 mg/dL    eGFR Non African Am 78 >59 mL/min/1.73    eGFR African Am 90 >59 mL/min/1.73    BUN/Creatinine Ratio 27 12 - 28    Sodium 141 134 - 144 mmol/L    Potassium 4.9 3.5 - 5.2 mmol/L    Chloride 104 96 - 106 mmol/L    Total CO2 24 20 - 29 mmol/L    Calcium 9.7 8.7 - 10.3 mg/dL    Total Protein 7.3 6.0 - 8.5 g/dL    Albumin 4.1 3.7 - 4.7 g/dL    Globulin 3.2 1.5 - 4.5 g/dL    A/G Ratio 1.3 1.2 - 2.2    Total Bilirubin 0.2 0.0 - 1.2 mg/dL    Alkaline Phosphatase 80 39 - 117 IU/L    AST (SGOT) 30 0 - 40 IU/L    ALT (SGPT) 17 0 - 32 IU/L   CBC & Differential    Collection Time: 09/09/20  1:09 PM    Specimen: Blood   Result Value Ref Range    WBC 4.9 3.4 - 10.8 x10E3/uL    RBC 4.30 3.77 - 5.28 x10E6/uL    Hemoglobin 11.0 (L) 11.1 - 15.9 g/dL    Hematocrit 35.7 34.0 - 46.6 %    MCV 83 79 - 97 fL    MCH 25.6 (L) 26.6 - 33.0 pg    MCHC 30.8 (L) 31.5 - 35.7 g/dL    RDW 15.6 (H) 11.7 - 15.4 %    Platelets 418 150 - 450 x10E3/uL    Neutrophil Rel % 52 Not Estab. %    Lymphocyte Rel % 31 Not Estab. %    Monocyte Rel % 11 Not Estab. %     Eosinophil Rel % 5 Not Estab. %    Basophil Rel % 1 Not Estab. %    Neutrophils Absolute 2.5 1.4 - 7.0 x10E3/uL    Lymphocytes Absolute 1.5 0.7 - 3.1 x10E3/uL    Monocytes Absolute 0.5 0.1 - 0.9 x10E3/uL    Eosinophils Absolute 0.2 0.0 - 0.4 x10E3/uL    Basophils Absolute 0.0 0.0 - 0.2 x10E3/uL    Immature Granulocyte Rel % 0 Not Estab. %    Immature Grans Absolute 0.0 0.0 - 0.1 x10E3/uL

## 2020-09-17 LAB
FERRITIN SERPL-MCNC: 22 NG/ML (ref 15–150)
IRON SATN MFR SERPL: 6 % (ref 15–55)
IRON SERPL-MCNC: 27 UG/DL (ref 27–139)
TIBC SERPL-MCNC: 446 UG/DL (ref 250–450)
UIBC SERPL-MCNC: 419 UG/DL (ref 118–369)
WRITTEN AUTHORIZATION: NORMAL

## 2020-10-07 DIAGNOSIS — F51.04 CHRONIC INSOMNIA: ICD-10-CM

## 2020-10-08 RX ORDER — ZOLPIDEM TARTRATE 10 MG/1
10 TABLET ORAL NIGHTLY PRN
Qty: 30 TABLET | Refills: 1 | Status: SHIPPED | OUTPATIENT
Start: 2020-10-08

## 2020-10-14 RX ORDER — EZETIMIBE 10 MG/1
10 TABLET ORAL NIGHTLY
Qty: 90 TABLET | Refills: 1 | Status: SHIPPED | OUTPATIENT
Start: 2020-10-14

## 2020-11-11 DIAGNOSIS — I10 ESSENTIAL (PRIMARY) HYPERTENSION: ICD-10-CM

## 2020-11-12 RX ORDER — METOPROLOL SUCCINATE 100 MG/1
50 TABLET, EXTENDED RELEASE ORAL DAILY
Qty: 90 TABLET | Refills: 0 | Status: SHIPPED | OUTPATIENT
Start: 2020-11-12 | End: 2020-12-18 | Stop reason: SDUPTHER

## 2020-11-12 RX ORDER — AMLODIPINE AND VALSARTAN 5; 160 MG/1; MG/1
TABLET ORAL
Qty: 30 TABLET | Refills: 5 | Status: SHIPPED | OUTPATIENT
Start: 2020-11-12

## 2020-12-16 DIAGNOSIS — F51.04 CHRONIC INSOMNIA: ICD-10-CM

## 2020-12-18 RX ORDER — METOPROLOL SUCCINATE 100 MG/1
50 TABLET, EXTENDED RELEASE ORAL DAILY
Qty: 15 TABLET | Refills: 0 | Status: SHIPPED | OUTPATIENT
Start: 2020-12-18

## 2020-12-23 RX ORDER — ZOLPIDEM TARTRATE 10 MG/1
10 TABLET ORAL NIGHTLY PRN
Qty: 30 TABLET | Refills: 1 | OUTPATIENT
Start: 2020-12-23

## 2020-12-29 ENCOUNTER — TELEPHONE (OUTPATIENT)
Dept: INTERNAL MEDICINE | Facility: CLINIC | Age: 75
End: 2020-12-29

## 2020-12-29 NOTE — TELEPHONE ENCOUNTER
Patient needs a refill for   zolpidem (AMBIEN) 10 MG tablet [[id number]]        Please send this to:    [facility]/pharmacy #[id number] - [place], [place] - [address] AT [facility] - [phone]  - [phone] FX   Phone:  [phone]   Fax:  [phone]   Address:  [address], [place] [address]

## 2021-09-24 NOTE — TELEPHONE ENCOUNTER
No, per Dr. Ramirez, would recommend that we do one knee at a time   Problem: Mobility Impaired (Adult and Pediatric)  Goal: *Acute Goals and Plan of Care (Insert Text)  Outcome: Progressing Towards Goal  Note:     LTG:     (1.)Mr. Lucinda Perea will ambulate with INDEPENDENT for 650 feet with the least restrictive device within 7 treatment day(s). (2.) Mr. Lucinda Perea will go up and down 12 steps with SBA within 7 treatment days. ________________________________________________________________________________________________      PHYSICAL THERAPY: Daily Note and AM 9/24/2021  INPATIENT: PT Visit Days : 2  Payor: Eugenia Stanley / Plan: Emir Bridges / Product Type: [employer] /       NAME/AGE/GENDER: Rody Gilbert is a 75 y.o. male   PRIMARY DIAGNOSIS: Admission for antineoplastic chemotherapy [Z51.11] <principal problem not specified> <principal problem not specified>       ICD-10: Treatment Diagnosis:    · Other abnormalities of gait and mobility (R26.89)   Precaution/Allergies:  Campath [alemtuzumab]      ASSESSMENT:     Mr. Lucinda Perea presents with decreased functional endurance due to a new onset of increased shortness of breath. He will benefit from PT here to increase gait distance as well as to insure he is independent with stairs. He ambulated with PT this afternoon for 150ft with SBA. Cueing to take standing rest breaks as well as to limit distance as needed. 9/24- up in room with wife present. Ambulated 560 ft without AD, pushing IV pole. SOB but able to chat during gait. 1 sitting rest break. This section established at most recent assessment   PROBLEM LIST (Impairments causing functional limitations):  1. Decreased Balance  2. Decreased Activity Tolerance   INTERVENTIONS PLANNED: (Benefits and precautions of physical therapy have been discussed with the patient.)  1. Bed Mobility  2. Gait Training  3. Therapeutic Exercise/Strengthening  4.  Transfer Training     TREATMENT PLAN: Frequency/Duration: daily until goals met  Rehabilitation Potential For Stated Goals: Excellent     REHAB RECOMMENDATIONS (at time of discharge pending progress):    Placement: It is my opinion, based on this patient's performance to date, that Mr. Jennie Davidson may benefit from being discharged with NO further skilled therapy due to the high likelihood of returning to baseline. Equipment:    None at this time              HISTORY:   History of Present Injury/Illness (Reason for Referral):  Mr. Jennie Davidson is a 75 y.o. male admitted on 9/22/2021 with a primary diagnosis of relapsed T-cell pro-lymphocytic leukemia. His PMH includes DVT (no longer on AC), GERD, and LEE. In 12/2018 he was diagnosed with T-Cell Pro-Lymphocytic Leukemia, in 1/2019 he was started on Alemtuzumab and received it for 8 weeks and achieved a MA, in 4/2019 he was switched to Pentostatin and received 14 doses and achieved a CR. Last week, he was noted to have leukocytosis with WBC 124k/ALC 120k on PCP labs and was sent to ED. At that time he report LE edema and was noted to have b/l cervical LAD. Follow up was arranged with Dr. Anabell Lamas. Peripheral flow +relapsed T-cell pro-lymphocytic leukemia. S/p BMbx 9/20, path pending. He returned today with c/o continued swelling and pain in BLE. Also reports GENTILE. CXR with borderline cardiomegaly. pBNP 730. WBC 115k/ANC 2.3/ALC 111k, Hgb 7.6, Plt 16k. He is being admitted for further evaluation and treatment. Past Medical History/Comorbidities:   Mr. Jennie Davidson  has a past medical history of Back pain, Cervical radiculopathy, Claustrophobia, DVT (deep venous thrombosis) ([address] (06/2019), GERD (gastroesophageal reflux disease), H/O seasonal allergies, Hyperlipidemia, Impotence, Insomnia, Lateral epicondylitis, Leukemia ([address] Obesity, and Sleep apnea.   Mr. Jennie Davidson  has a past surgical history that includes hx circumcision; hx colonoscopy (2017); hx wisdom teeth extraction; ir insert tunl cvc w port over 5 years (6/19/2019); hx orthopaedic (Right); and ir remove tunl cvad w port/pump (2020). Social History/Living Environment:   Home Environment: Private residence  One/Two Story Residence: One story  Living Alone: No  Support Systems: Spouse/Significant Other (wife/Nisha [phone])  Patient Expects to be Discharged to[de-identified] House  Current DME Used/Available at Home: CPAP  Prior Level of Function/Work/Activity:  Independent prior to admit     Number of Personal Factors/Comorbidities that affect the Plan of Care: 0: LOW COMPLEXITY   EXAMINATION:   Most Recent Physical Functioning:   Gross Assessment:  AROM: Within functional limits  Strength: Within functional limits  Sensation: Intact               Posture:     Balance:  Sitting: Intact  Standing: Intact Bed Mobility:  Rolling: Independent  Supine to Sit: Independent  Sit to Supine: Independent  Scooting: Independent  Wheelchair Mobility:     Transfers:  Sit to Stand: Independent  Stand to Sit: Independent  Bed to Chair: Independent  Gait:     Speed/Crista: Pace decreased (<100 feet/min)  Gait Abnormalities: Decreased step clearance;Trunk sway increased  Distance (ft): 560 Feet (ft)  Assistive Device:  (pushing IV pole)  Ambulation - Level of Assistance: Stand-by assistance;Supervision  Interventions: Verbal cues; Safety awareness training      Body Structures Involved:  1. None Body Functions Affected:  1. Movement Related Activities and Participation Affected:  1. Mobility   Number of elements that affect the Plan of Care: 1-2: LOW COMPLEXITY   CLINICAL PRESENTATION:   Presentation: Stable and uncomplicated: LOW COMPLEXITY   CLINICAL DECISION MAKIN [place] [id number] AM-PAC 6 Clicks   Basic Mobility Inpatient Short Form  How much difficulty does the patient currently have. .. Unable A Lot A Little None   1. Turning over in bed (including adjusting bedclothes, sheets and blankets)? [] 1   [] 2   [] 3   [x] 4   2.   Sitting down on and standing up from a chair with arms ( e.g., wheelchair, bedside commode, etc.)   [] 1   [] 2 [] 3   [x] 4   3. Moving from lying on back to sitting on the side of the bed? [] 1   [] 2   [] 3   [x] 4   How much help from another person does the patient currently need. .. Total A Lot A Little None   4. Moving to and from a bed to a chair (including a wheelchair)? [] 1   [] 2   [] 3   [x] 4   5. Need to walk in hospital room? [] 1   [] 2   [x] 3   [] 4   6. Climbing 3-5 steps with a railing? [] 1   [] 2   [x] 3   [] 4   © 2007, Trustees of [address] [place] [address] [address] [address], under license to [employer]. All rights reserved      Score:  Initial: 22 Most Recent: X (Date: -- )    Interpretation of Tool:  Represents activities that are increasingly more difficult (i.e. Bed mobility, Transfers, Gait). Medical Necessity:     · Patient is expected to demonstrate progress in   · balance and functional technique  ·  to   · increase independence with giat and transfers  · . Reason for Services/Other Comments:  · Patient continues to require skilled intervention due to   · Limited functional endurance  · . Use of outcome tool(s) and clinical judgement create a POC that gives a: Clear prediction of patient's progress: LOW COMPLEXITY            TREATMENT:   (In addition to Assessment/Re-Assessment sessions the following treatments were rendered)   Pre-treatment Symptoms/Complaints:  none  Pain: Initial:   Pain Intensity 1: 0  Post Session:  0     Therapeutic Activity: (    25 minutes): Therapeutic activities including Ambulation on level ground to improve mobility, strength, and balance. Required minimal Verbal cues; Safety awareness training to promote dynamic balance in standing. Braces/Orthotics/Lines/Etc:   · IV  Treatment/Session Assessment:    · Response to Treatment:  SOB at rest which increases with activity but patient able to pace himself appropriatly.   · Interdisciplinary Collaboration:   o Physical Therapist  · After treatment position/precautions:   o Up in room     · Compliance with Program/Exercises: Compliant all of the time, Will assess as treatment progresses  · Recommendations/Intent for next treatment session: \"Next visit will focus on advancements to more challenging activities\".   Total Treatment Duration:  PT Patient Time In/Time Out  Time In: 0955  Time Out: 8986 Dorcas Phillips, PT

## 2021-12-27 PROBLEM — I10 BENIGN ESSENTIAL HYPERTENSION: Chronic | Status: ACTIVE | Noted: 2020-09-04

## 2022-03-25 PROBLEM — F90.2 ATTENTION DEFICIT HYPERACTIVITY DISORDER (ADHD), COMBINED TYPE: Chronic | Status: ACTIVE | Noted: 2020-09-10

## 2022-03-25 PROBLEM — E78.5 DYSLIPIDEMIA: Status: ACTIVE | Noted: 2022-03-25

## 2022-03-25 PROBLEM — E78.5 DYSLIPIDEMIA: Chronic | Status: ACTIVE | Noted: 2022-03-25

## 2022-03-25 PROBLEM — R06.09 DYSPNEA ON EXERTION: Chronic | Status: ACTIVE | Noted: 2020-09-15

## 2023-08-26 ENCOUNTER — APPOINTMENT (OUTPATIENT)
Dept: CT IMAGING | Facility: HOSPITAL | Age: 78
DRG: 435 | End: 2023-08-26
Payer: MEDICARE

## 2023-08-26 ENCOUNTER — HOSPITAL ENCOUNTER (INPATIENT)
Facility: HOSPITAL | Age: 78
LOS: 4 days | Discharge: HOME | DRG: 435 | End: 2023-08-31
Attending: EMERGENCY MEDICINE | Admitting: INTERNAL MEDICINE
Payer: MEDICARE

## 2023-08-26 DIAGNOSIS — N94.9 ADNEXAL CYST: ICD-10-CM

## 2023-08-26 DIAGNOSIS — F51.04 CHRONIC INSOMNIA: ICD-10-CM

## 2023-08-26 DIAGNOSIS — K83.1 BILIARY OBSTRUCTION: ICD-10-CM

## 2023-08-26 DIAGNOSIS — R74.8 ELEVATED LIVER ENZYMES: ICD-10-CM

## 2023-08-26 DIAGNOSIS — R00.0 TACHYCARDIA: ICD-10-CM

## 2023-08-26 DIAGNOSIS — K86.89 PANCREATIC MASS: ICD-10-CM

## 2023-08-26 DIAGNOSIS — R17 JAUNDICE: Primary | ICD-10-CM

## 2023-08-26 LAB
ALBUMIN SERPL-MCNC: 3.7 G/DL (ref 3.5–5.2)
ALBUMIN/GLOB SERPL: 1.2 G/DL
ALP SERPL-CCNC: 284 U/L (ref 39–117)
ALT SERPL W P-5'-P-CCNC: 144 U/L (ref 1–33)
AMMONIA BLD-SCNC: 32 UMOL/L (ref 11–51)
AMPHET+METHAMPHET UR QL: NEGATIVE
ANION GAP SERPL CALCULATED.3IONS-SCNC: 19 MMOL/L (ref 5–15)
APTT PPP: 32.3 SECONDS (ref 22.7–35.4)
AST SERPL-CCNC: 163 U/L (ref 1–32)
BACTERIA UR QL AUTO: ABNORMAL /HPF
BARBITURATES UR QL SCN: NEGATIVE
BENZODIAZ UR QL SCN: NEGATIVE
BILIRUB SERPL-MCNC: 33.2 MG/DL (ref 0–1.2)
BILIRUB UR QL STRIP: ABNORMAL
BUN SERPL-MCNC: 14 MG/DL (ref 8–23)
BUN/CREAT SERPL: 15.1 (ref 7–25)
CALCIUM SPEC-SCNC: 9.2 MG/DL (ref 8.6–10.5)
CANNABINOIDS SERPL QL: NEGATIVE
CHLORIDE SERPL-SCNC: 97 MMOL/L (ref 98–107)
CLARITY UR: CLEAR
CO2 SERPL-SCNC: 17 MMOL/L (ref 22–29)
COCAINE UR QL: NEGATIVE
COLOR UR: ABNORMAL
CREAT SERPL-MCNC: 0.93 MG/DL (ref 0.57–1)
EGFRCR SERPLBLD CKD-EPI 2021: 62.3 ML/MIN/1.73
ETHANOL BLD-MCNC: <10 MG/DL (ref 0–10)
ETHANOL UR QL: <0.01 %
FENTANYL UR-MCNC: NEGATIVE NG/ML
GLOBULIN UR ELPH-MCNC: 3.2 GM/DL
GLUCOSE SERPL-MCNC: 131 MG/DL (ref 65–99)
GLUCOSE UR STRIP-MCNC: NEGATIVE MG/DL
HGB UR QL STRIP.AUTO: NEGATIVE
HYALINE CASTS UR QL AUTO: ABNORMAL /LPF
INR PPP: 1.72 (ref 0.9–1.1)
KETONES UR QL STRIP: NEGATIVE
LEUKOCYTE ESTERASE UR QL STRIP.AUTO: ABNORMAL
METHADONE UR QL SCN: NEGATIVE
NITRITE UR QL STRIP: NEGATIVE
OPIATES UR QL: NEGATIVE
OXYCODONE UR QL SCN: NEGATIVE
PH UR STRIP.AUTO: 6.5 [PH] (ref 5–8)
POTASSIUM SERPL-SCNC: 3.5 MMOL/L (ref 3.5–5.2)
PROT SERPL-MCNC: 6.9 G/DL (ref 6–8.5)
PROT UR QL STRIP: NEGATIVE
PROTHROMBIN TIME: 20.4 SECONDS (ref 11.7–14.2)
RBC # UR STRIP: ABNORMAL /HPF
REF LAB TEST METHOD: ABNORMAL
SODIUM SERPL-SCNC: 133 MMOL/L (ref 136–145)
SP GR UR STRIP: <1.005 (ref 1–1.03)
SQUAMOUS #/AREA URNS HPF: ABNORMAL /HPF
TROPONIN T SERPL HS-MCNC: 11 NG/L
UROBILINOGEN UR QL STRIP: ABNORMAL
WBC # UR STRIP: ABNORMAL /HPF

## 2023-08-26 PROCEDURE — 80307 DRUG TEST PRSMV CHEM ANLYZR: CPT | Performed by: EMERGENCY MEDICINE

## 2023-08-26 PROCEDURE — 81001 URINALYSIS AUTO W/SCOPE: CPT | Performed by: EMERGENCY MEDICINE

## 2023-08-26 PROCEDURE — 80053 COMPREHEN METABOLIC PANEL: CPT | Performed by: EMERGENCY MEDICINE

## 2023-08-26 PROCEDURE — 74176 CT ABD & PELVIS W/O CONTRAST: CPT

## 2023-08-26 PROCEDURE — 93010 ELECTROCARDIOGRAM REPORT: CPT | Performed by: INTERNAL MEDICINE

## 2023-08-26 PROCEDURE — 99285 EMERGENCY DEPT VISIT HI MDM: CPT

## 2023-08-26 PROCEDURE — 85610 PROTHROMBIN TIME: CPT | Performed by: EMERGENCY MEDICINE

## 2023-08-26 PROCEDURE — 85730 THROMBOPLASTIN TIME PARTIAL: CPT | Performed by: EMERGENCY MEDICINE

## 2023-08-26 PROCEDURE — 93005 ELECTROCARDIOGRAM TRACING: CPT | Performed by: EMERGENCY MEDICINE

## 2023-08-26 PROCEDURE — 84484 ASSAY OF TROPONIN QUANT: CPT | Performed by: EMERGENCY MEDICINE

## 2023-08-26 PROCEDURE — 36415 COLL VENOUS BLD VENIPUNCTURE: CPT

## 2023-08-26 PROCEDURE — 82140 ASSAY OF AMMONIA: CPT | Performed by: EMERGENCY MEDICINE

## 2023-08-26 PROCEDURE — 82077 ASSAY SPEC XCP UR&BREATH IA: CPT | Performed by: EMERGENCY MEDICINE

## 2023-08-26 RX ORDER — SODIUM CHLORIDE 0.9 % (FLUSH) 0.9 %
10 SYRINGE (ML) INJECTION AS NEEDED
Status: DISCONTINUED | OUTPATIENT
Start: 2023-08-26 | End: 2023-08-31 | Stop reason: HOSPADM

## 2023-08-26 RX ADMIN — SODIUM CHLORIDE, POTASSIUM CHLORIDE, SODIUM LACTATE AND CALCIUM CHLORIDE 1000 ML: 600; 310; 30; 20 INJECTION, SOLUTION INTRAVENOUS at 22:00

## 2023-08-27 ENCOUNTER — APPOINTMENT (OUTPATIENT)
Dept: MRI IMAGING | Facility: HOSPITAL | Age: 78
DRG: 435 | End: 2023-08-27
Payer: MEDICARE

## 2023-08-27 ENCOUNTER — APPOINTMENT (OUTPATIENT)
Dept: CT IMAGING | Facility: HOSPITAL | Age: 78
DRG: 435 | End: 2023-08-27
Payer: MEDICARE

## 2023-08-27 PROBLEM — R74.8 ELEVATED LIVER ENZYMES: Status: ACTIVE | Noted: 2023-08-27

## 2023-08-27 PROBLEM — K83.1 BILIARY OBSTRUCTION: Status: ACTIVE | Noted: 2023-08-27

## 2023-08-27 LAB
ANION GAP SERPL CALCULATED.3IONS-SCNC: 16 MMOL/L (ref 5–15)
BASOPHILS # BLD AUTO: 0.03 10*3/MM3 (ref 0–0.2)
BASOPHILS NFR BLD AUTO: 0.6 % (ref 0–1.5)
BUN SERPL-MCNC: 12 MG/DL (ref 8–23)
BUN/CREAT SERPL: 13.5 (ref 7–25)
CALCIUM SPEC-SCNC: 8.9 MG/DL (ref 8.6–10.5)
CHLORIDE SERPL-SCNC: 103 MMOL/L (ref 98–107)
CO2 SERPL-SCNC: 19 MMOL/L (ref 22–29)
CREAT SERPL-MCNC: 0.89 MG/DL (ref 0.57–1)
DEPRECATED RDW RBC AUTO: 60.3 FL (ref 37–54)
EGFRCR SERPLBLD CKD-EPI 2021: 65.6 ML/MIN/1.73
EOSINOPHIL # BLD AUTO: 0.03 10*3/MM3 (ref 0–0.4)
EOSINOPHIL NFR BLD AUTO: 0.6 % (ref 0.3–6.2)
ERYTHROCYTE [DISTWIDTH] IN BLOOD BY AUTOMATED COUNT: 20.4 % (ref 12.3–15.4)
GLUCOSE SERPL-MCNC: 118 MG/DL (ref 65–99)
HCT VFR BLD AUTO: 36.4 % (ref 34–46.6)
HGB BLD-MCNC: 13.2 G/DL (ref 12–15.9)
IMM GRANULOCYTES # BLD AUTO: 0.02 10*3/MM3 (ref 0–0.05)
IMM GRANULOCYTES NFR BLD AUTO: 0.4 % (ref 0–0.5)
INR PPP: 2.01 (ref 0.9–1.1)
LYMPHOCYTES # BLD AUTO: 0.76 10*3/MM3 (ref 0.7–3.1)
LYMPHOCYTES NFR BLD AUTO: 15.4 % (ref 19.6–45.3)
MAGNESIUM SERPL-MCNC: 3.4 MG/DL (ref 1.6–2.4)
MCH RBC QN AUTO: 29.9 PG (ref 26.6–33)
MCHC RBC AUTO-ENTMCNC: 36.3 G/DL (ref 31.5–35.7)
MCV RBC AUTO: 82.5 FL (ref 79–97)
MONOCYTES # BLD AUTO: 0.6 10*3/MM3 (ref 0.1–0.9)
MONOCYTES NFR BLD AUTO: 12.1 % (ref 5–12)
NEUTROPHILS NFR BLD AUTO: 3.5 10*3/MM3 (ref 1.7–7)
NEUTROPHILS NFR BLD AUTO: 70.9 % (ref 42.7–76)
NRBC BLD AUTO-RTO: 0 /100 WBC (ref 0–0.2)
PLATELET # BLD AUTO: 330 10*3/MM3 (ref 140–450)
PMV BLD AUTO: 9.6 FL (ref 6–12)
POTASSIUM SERPL-SCNC: 2.8 MMOL/L (ref 3.5–5.2)
PROTHROMBIN TIME: 23.1 SECONDS (ref 11.7–14.2)
QT INTERVAL: 267 MS
QTC INTERVAL: 398 MS
RBC # BLD AUTO: 4.41 10*6/MM3 (ref 3.77–5.28)
SODIUM SERPL-SCNC: 138 MMOL/L (ref 136–145)
WBC NRBC COR # BLD: 4.94 10*3/MM3 (ref 3.4–10.8)

## 2023-08-27 PROCEDURE — 70450 CT HEAD/BRAIN W/O DYE: CPT

## 2023-08-27 PROCEDURE — 85610 PROTHROMBIN TIME: CPT | Performed by: NURSE PRACTITIONER

## 2023-08-27 PROCEDURE — 63710000001 DIPHENHYDRAMINE PER 50 MG: Performed by: NURSE PRACTITIONER

## 2023-08-27 PROCEDURE — 36415 COLL VENOUS BLD VENIPUNCTURE: CPT | Performed by: NURSE PRACTITIONER

## 2023-08-27 PROCEDURE — 85025 COMPLETE CBC W/AUTO DIFF WBC: CPT | Performed by: NURSE PRACTITIONER

## 2023-08-27 PROCEDURE — 0 GADOBENATE DIMEGLUMINE 529 MG/ML SOLUTION: Performed by: INTERNAL MEDICINE

## 2023-08-27 PROCEDURE — A9577 INJ MULTIHANCE: HCPCS | Performed by: INTERNAL MEDICINE

## 2023-08-27 PROCEDURE — 83735 ASSAY OF MAGNESIUM: CPT | Performed by: INTERNAL MEDICINE

## 2023-08-27 PROCEDURE — 99222 1ST HOSP IP/OBS MODERATE 55: CPT | Performed by: INTERNAL MEDICINE

## 2023-08-27 PROCEDURE — 80048 BASIC METABOLIC PNL TOTAL CA: CPT | Performed by: NURSE PRACTITIONER

## 2023-08-27 PROCEDURE — 74183 MRI ABD W/O CNTR FLWD CNTR: CPT

## 2023-08-27 RX ORDER — METHYLPHENIDATE HYDROCHLORIDE 10 MG/1
10 TABLET ORAL 2 TIMES DAILY
COMMUNITY
End: 2023-08-31 | Stop reason: HOSPADM

## 2023-08-27 RX ORDER — BISACODYL 10 MG
10 SUPPOSITORY, RECTAL RECTAL DAILY PRN
Status: DISCONTINUED | OUTPATIENT
Start: 2023-08-27 | End: 2023-08-31 | Stop reason: HOSPADM

## 2023-08-27 RX ORDER — SODIUM CHLORIDE 9 MG/ML
100 INJECTION, SOLUTION INTRAVENOUS CONTINUOUS
Status: DISCONTINUED | OUTPATIENT
Start: 2023-08-27 | End: 2023-08-27

## 2023-08-27 RX ORDER — HYDROXYZINE HYDROCHLORIDE 25 MG/1
25 TABLET, FILM COATED ORAL 3 TIMES DAILY PRN
Status: DISCONTINUED | OUTPATIENT
Start: 2023-08-27 | End: 2023-08-31 | Stop reason: HOSPADM

## 2023-08-27 RX ORDER — SODIUM CHLORIDE 9 MG/ML
40 INJECTION, SOLUTION INTRAVENOUS AS NEEDED
Status: DISCONTINUED | OUTPATIENT
Start: 2023-08-27 | End: 2023-08-31 | Stop reason: HOSPADM

## 2023-08-27 RX ORDER — POLYETHYLENE GLYCOL 3350 17 G/17G
17 POWDER, FOR SOLUTION ORAL DAILY PRN
Status: DISCONTINUED | OUTPATIENT
Start: 2023-08-27 | End: 2023-08-31 | Stop reason: HOSPADM

## 2023-08-27 RX ORDER — AMOXICILLIN 250 MG
2 CAPSULE ORAL 2 TIMES DAILY
Status: DISCONTINUED | OUTPATIENT
Start: 2023-08-27 | End: 2023-08-31 | Stop reason: HOSPADM

## 2023-08-27 RX ORDER — POTASSIUM CHLORIDE 750 MG/1
40 TABLET, FILM COATED, EXTENDED RELEASE ORAL EVERY 4 HOURS
Status: COMPLETED | OUTPATIENT
Start: 2023-08-27 | End: 2023-08-27

## 2023-08-27 RX ORDER — ONDANSETRON 2 MG/ML
4 INJECTION INTRAMUSCULAR; INTRAVENOUS EVERY 6 HOURS PRN
Status: DISCONTINUED | OUTPATIENT
Start: 2023-08-27 | End: 2023-08-31 | Stop reason: HOSPADM

## 2023-08-27 RX ORDER — METOPROLOL SUCCINATE 25 MG/1
25 TABLET, EXTENDED RELEASE ORAL
Status: DISCONTINUED | OUTPATIENT
Start: 2023-08-27 | End: 2023-08-31 | Stop reason: HOSPADM

## 2023-08-27 RX ORDER — SODIUM CHLORIDE 0.9 % (FLUSH) 0.9 %
10 SYRINGE (ML) INJECTION EVERY 12 HOURS SCHEDULED
Status: DISCONTINUED | OUTPATIENT
Start: 2023-08-27 | End: 2023-08-31 | Stop reason: HOSPADM

## 2023-08-27 RX ORDER — DIPHENHYDRAMINE HCL 25 MG
25 CAPSULE ORAL ONCE
Status: COMPLETED | OUTPATIENT
Start: 2023-08-27 | End: 2023-08-27

## 2023-08-27 RX ORDER — BISACODYL 5 MG/1
5 TABLET, DELAYED RELEASE ORAL DAILY PRN
Status: DISCONTINUED | OUTPATIENT
Start: 2023-08-27 | End: 2023-08-31 | Stop reason: HOSPADM

## 2023-08-27 RX ORDER — SODIUM CHLORIDE, SODIUM LACTATE, POTASSIUM CHLORIDE, CALCIUM CHLORIDE 600; 310; 30; 20 MG/100ML; MG/100ML; MG/100ML; MG/100ML
100 INJECTION, SOLUTION INTRAVENOUS CONTINUOUS
Status: DISCONTINUED | OUTPATIENT
Start: 2023-08-27 | End: 2023-08-31

## 2023-08-27 RX ORDER — SODIUM CHLORIDE 0.9 % (FLUSH) 0.9 %
10 SYRINGE (ML) INJECTION AS NEEDED
Status: DISCONTINUED | OUTPATIENT
Start: 2023-08-27 | End: 2023-08-31 | Stop reason: HOSPADM

## 2023-08-27 RX ORDER — NITROGLYCERIN 0.4 MG/1
0.4 TABLET SUBLINGUAL
Status: DISCONTINUED | OUTPATIENT
Start: 2023-08-27 | End: 2023-08-31 | Stop reason: HOSPADM

## 2023-08-27 RX ADMIN — SODIUM CHLORIDE 100 ML/HR: 9 INJECTION, SOLUTION INTRAVENOUS at 02:33

## 2023-08-27 RX ADMIN — DIPHENHYDRAMINE HYDROCHLORIDE 25 MG: 25 CAPSULE ORAL at 03:09

## 2023-08-27 RX ADMIN — HYDROXYZINE HYDROCHLORIDE 25 MG: 25 TABLET ORAL at 16:22

## 2023-08-27 RX ADMIN — Medication 10 ML: at 21:00

## 2023-08-27 RX ADMIN — SODIUM CHLORIDE, POTASSIUM CHLORIDE, SODIUM LACTATE AND CALCIUM CHLORIDE 100 ML/HR: 600; 310; 30; 20 INJECTION, SOLUTION INTRAVENOUS at 16:22

## 2023-08-27 RX ADMIN — SENNOSIDES AND DOCUSATE SODIUM 2 TABLET: 50; 8.6 TABLET ORAL at 02:34

## 2023-08-27 RX ADMIN — GADOBENATE DIMEGLUMINE 12 ML: 529 INJECTION, SOLUTION INTRAVENOUS at 15:07

## 2023-08-27 RX ADMIN — Medication 10 ML: at 11:58

## 2023-08-27 RX ADMIN — POTASSIUM CHLORIDE 40 MEQ: 750 TABLET, EXTENDED RELEASE ORAL at 20:59

## 2023-08-27 RX ADMIN — POTASSIUM CHLORIDE 40 MEQ: 750 TABLET, EXTENDED RELEASE ORAL at 16:22

## 2023-08-27 RX ADMIN — Medication 10 ML: at 02:34

## 2023-08-27 RX ADMIN — METOPROLOL SUCCINATE 25 MG: 25 TABLET, EXTENDED RELEASE ORAL at 20:59

## 2023-08-27 RX ADMIN — SENNOSIDES AND DOCUSATE SODIUM 2 TABLET: 50; 8.6 TABLET ORAL at 20:59

## 2023-08-27 RX ADMIN — POTASSIUM CHLORIDE 40 MEQ: 750 TABLET, EXTENDED RELEASE ORAL at 11:53

## 2023-08-27 NOTE — ED NOTES
.Nursing report ED to floor  Lizzie Bose  78 y.o.  female    HPI :   Chief Complaint   Patient presents with    Hypertension    Rapid Heart Rate       Admitting doctor:   Taran Fierro MD    Admitting diagnosis:   The primary encounter diagnosis was Jaundice. A diagnosis of Biliary obstruction was also pertinent to this visit.    Code status:   Current Code Status       Date Active Code Status Order ID Comments User Context       8/27/2023 0017 CPR (Attempt to Resuscitate) [id number]  Nimisha Kumar, APRN ED        Question Answer    Code Status (Patient has no pulse and is not breathing) CPR (Attempt to Resuscitate)    Medical Interventions (Patient has pulse or is breathing) Full Support                    Allergies:   Atorvastatin    Isolation:   No active isolations    Intake and Output    Intake/Output Summary (Last 24 hours) at 8/27/2023 0041  Last data filed at 8/26/2023 2109  Gross per 24 hour   Intake 300 ml   Output --   Net 300 ml       Weight:   There were no vitals filed for this visit.    Most recent vitals:   Vitals:    08/26/23 2110   BP: (!) 172/101   Pulse: (!) 124   Resp: 18   Temp: 97.9 °F (36.6 °C)   SpO2: 98%       Active LDAs/IV Access:   Lines, Drains & Airways       Active LDAs       Name Placement date Placement time Site Days    Peripheral IV 07/13/20 1044 Left;Posterior Hand 07/13/20  1044  Hand  1139    Peripheral IV 08/26/23 2109 Anterior;Left;Proximal Forearm 08/26/23 2109  Forearm  less than 1                    Labs (abnormal labs have a star):   Labs Reviewed   COMPREHENSIVE METABOLIC PANEL - Abnormal; Notable for the following components:       Result Value    Glucose 131 (*)     Sodium 133 (*)     Chloride 97 (*)     CO2 17.0 (*)     ALT (SGPT) 144 (*)     AST (SGOT) 163 (*)     Alkaline Phosphatase 284 (*)     Total Bilirubin 33.2 (*)     Anion Gap 19.0 (*)     All other components within normal limits    Narrative:     GFR Normal >60  Chronic Kidney Disease  <60  Kidney Failure <15    The GFR formula is only valid for adults with stable renal function between ages 18 and 70.   PROTIME-INR - Abnormal; Notable for the following components:    Protime 20.4 (*)     INR 1.72 (*)     All other components within normal limits   URINALYSIS W/ MICROSCOPIC IF INDICATED (NO CULTURE) - Abnormal; Notable for the following components:    Color, UA Dark Yellow (*)     Specific Gravity, UA <1.005 (*)     Bilirubin, UA Large (3+) (*)     Leuk Esterase, UA Moderate (2+) (*)     All other components within normal limits   SINGLE HSTROPONIN T - Abnormal; Notable for the following components:    HS Troponin T 11 (*)     All other components within normal limits    Narrative:     High Sensitive Troponin T Reference Range:  <10.0 ng/L- Negative Female for AMI  <15.0 ng/L- Negative Male for AMI  >=10 - Abnormal Female indicating possible myocardial injury.  >=15 - Abnormal Male indicating possible myocardial injury.   Clinicians would have to utilize clinical acumen, EKG, Troponin, and serial changes to determine if it is an Acute Myocardial Infarction or myocardial injury due to an underlying chronic condition.        URINALYSIS, MICROSCOPIC ONLY - Abnormal; Notable for the following components:    Squamous Epithelial Cells, UA 3-6 (*)     All other components within normal limits   APTT - Normal   URINE DRUG SCREEN - Normal    Narrative:     Negative Thresholds Per Drugs Screened:    Amphetamines                 500 ng/ml  Barbiturates                 200 ng/ml  Benzodiazepines              100 ng/ml  Cocaine                      300 ng/ml  Methadone                    300 ng/ml  Opiates                      300 ng/ml  Oxycodone                    100 ng/ml  THC                           50 ng/ml  Fentanyl                       5 ng/ml      The Normal Value for all drugs tested is negative. This report includes final unconfirmed screening results to be used for medical treatment purposes  only. Unconfirmed results must not be used for non-medical purposes such as employment or legal testing. Clinical consideration should be applied to any drug of abuse test, particularly when unconfirmed results are used.           AMMONIA - Normal   ETHANOL   BASIC METABOLIC PANEL   CBC WITH AUTO DIFFERENTIAL   PROTIME-INR       EKG:   ECG 12 Lead Altered Mental Status   Preliminary Result   HEART RATE= 133  bpm   RR Interval= 451  ms   VA Interval= 163  ms   P Horizontal Axis= -87  deg   P Front Axis= -46  deg   QRSD Interval= 125  ms   QT Interval= 267  ms   QTcB= 398  ms   QRS Axis= -10  deg   T Wave Axis= 224  deg   - ABNORMAL ECG -   Sinus or ectopic atrial tachycardia   Nonspecific intraventricular conduction delay   Abnormal inferior Q waves   Anterior infarct, old   Repol abnrm, prob ischemia, anterolateral lds   Partial missing lead(s): V6   Electronically Signed By:    Date and Time of Study: 2023-08-26 21:50:42          Meds given in ED:   Medications   sodium chloride 0.9 % flush 10 mL (has no administration in time range)   sodium chloride 0.9 % flush 10 mL (has no administration in time range)   sodium chloride 0.9 % flush 10 mL (has no administration in time range)   sodium chloride 0.9 % infusion 40 mL (has no administration in time range)   sennosides-docusate (PERICOLACE) 8.6-50 MG per tablet 2 tablet (has no administration in time range)     And   polyethylene glycol (MIRALAX) packet 17 g (has no administration in time range)     And   bisacodyl (DULCOLAX) EC tablet 5 mg (has no administration in time range)     And   bisacodyl (DULCOLAX) suppository 10 mg (has no administration in time range)   nitroglycerin (NITROSTAT) SL tablet 0.4 mg (has no administration in time range)   sodium chloride 0.9 % infusion (has no administration in time range)   ondansetron (ZOFRAN) injection 4 mg (has no administration in time range)   lactated ringers bolus 1,000 mL (1,000 mL Intravenous New Bag 8/26/23 2200)        Imaging results:  CT Abdomen Pelvis Without Contrast    Result Date: 8/26/2023  1.  Findings of biliary obstruction with severe intra and extrahepatic bili ductal dilation. The common bile duct becomes abruptly decompressed as it projects through the pancreatic head. Gallbladder is also distended. While findings are incompletely characterized, they are most concerning for underlying pancreatic malignancy and further evaluation with MRCP versus ERCP is recommended to further characterize. 2.  A few nodular areas of pulmonary opacification of the bilateral lung bases which given the above findings are nonspecific. At least continued close interval follow-up is recommended. 3.  Left adnexal cystic lesion measuring up to 5.7 cm, incompletely evaluated. Further evaluation with pelvic sonogram is recommended to exclude neoplasm. 4.  Other findings as above.   This report was finalized on 8/26/2023 11:19 PM by Dr. Pierre Garcia M.D.       Ambulatory status:   - up with assistance to     Social issues:   Social History     Socioeconomic History    Marital status:    Tobacco Use    Smoking status: Never    Smokeless tobacco: Never   Substance and Sexual Activity    Alcohol use: No    Drug use: Never       NIH Stroke Scale:       Shakira Martinez RN  08/27/23 00:41 EDT

## 2023-08-27 NOTE — NURSING NOTE
Informed by patients friend at bedside that patient drank 3 medicine cups full of water after instructed she was NPO until GI doctor came to see patient. Dr. Hinton on unit and made aware.

## 2023-08-27 NOTE — CONSULTS
Gastroenterology   Initial Inpatient Consult Note  Thank you for asking opinion on this patient.  Referring Provider: Ankur Gomez MD    Reason for Consultation: Jaundice    Subjective     History of present illness:    78 y.o. female that we are asked see for jaundice.  Patient complains primarily to me of new issues with itching and also development of jaundice over the last couple of weeks.  She denies any fever or chills at home.  She denies to me any abdominal pain.  She apparently had been without electricity at her home and had been acting somewhat confused per the patient's friend.  Patient was normotensive but tachycardic on initial evaluation.  Patient has a history of alcohol use in the past but she says that she has had very little over the last couple of years.  She denies any abdominal pain.    Past Medical History:  Past Medical History:   Diagnosis Date    ADD (attention deficit disorder)     Arthritis     Depression     High blood pressure     History of Clostridioides difficile infection     Hyperlipidemia     Low back pain     Right knee pain     Sleep apnea     NO USING MACHINE     Past Surgical History:  Past Surgical History:   Procedure Laterality Date    APPENDECTOMY      CARPAL TUNNEL RELEASE Right     CATARACT EXTRACTION Bilateral     HAMMER TOE REPAIR Left     HIP ARTHROPLASTY Left     TOTAL KNEE ARTHROPLASTY Right 7/13/2020    Procedure: TOTAL KNEE ARTHROPLASTY;  Surgeon: Krish Ramirez MD;  Location: [facility];  Service: Orthopedics;  Laterality: Right;      Social History:   Social History     Tobacco Use    Smoking status: Never    Smokeless tobacco: Never   Substance Use Topics    Alcohol use: No      Family History:  Family History   Problem Relation Age of Onset    Diabetes Brother     Hypertension Brother     Clotting disorder Son     Malig Hyperthermia Neg Hx        Home Meds:  Medications Prior to Admission   Medication Sig Dispense Refill Last Dose     amLODIPine-valsartan (EXFORGE) 5-160 MG per tablet TAKE 1 TABLET BY MOUTH EVERY DAY 30 tablet 5 Unknown    amphetamine-dextroamphetamine (ADDERALL) 10 MG tablet HOLD PRIOR TO SURG (Patient not taking: Reported on 8/27/2023)  0 Not Taking    ASPIRIN 81 MG EC tablet TAKE 1 TABLET BY MOUTH EVERY 12 (TWELVE) HOURS FOR 60 DOSES. 60 tablet 0 Unknown    cholecalciferol (VITAMIN D3) 25 MCG (1000 UT) tablet Take 1 tablet by mouth Daily.   Unknown    ezetimibe (Zetia) 10 MG tablet Take 1 tablet by mouth Every Night. 90 tablet 1     HYDROcodone-acetaminophen (NORCO) 7.5-325 MG per tablet 1-2 po q 4-6 hr prn pain (Patient not taking: Reported on 8/27/2023) 60 tablet 0 Not Taking    meloxicam (MOBIC) 15 MG tablet Take 1 tablet by mouth Daily. PRN/INSTRUCTED PT TO FOLLOW MD INSTRUCTIONS REGARDING HOLDING FOR SURGERY (Patient not taking: Reported on 8/27/2023)   Not Taking    methylphenidate (RITALIN) 10 MG tablet Take 1 tablet by mouth 2 (Two) Times a Day.   Unknown    metoprolol succinate XL (TOPROL-XL) 100 MG 24 hr tablet Take 0.5 tablets by mouth Daily. (Patient not taking: Reported on 8/27/2023) 15 tablet 0 Not Taking    PRISTIQ 50 MG 24 hr tablet Take 1 tablet by mouth Daily.   Unknown    zolpidem (AMBIEN) 10 MG tablet TAKE 1 TABLET BY MOUTH AT NIGHT AS NEEDED FOR SLEEP. 30 tablet 1 Unknown     Current Meds:   senna-docusate sodium, 2 tablet, Oral, BID  sodium chloride, 10 mL, Intravenous, Q12H      Allergies:  Allergies   Allergen Reactions    Atorvastatin      Review of Systems  Pertinent items are noted in HPI, all other systems reviewed and negative    Objective     Vital Signs  Temp:  [97.9 °F (36.6 °C)-98.8 °F (37.1 °C)] 98.8 °F (37.1 °C)  Heart Rate:  [] 88  Resp:  [17-18] 17  BP: (141-172)/() 144/89    Physical Exam:  CONSTITUTIONAL:  today's vital signs reviewed  EARS NOSE THROAT: trachea midline and no deformity of the nares  EYES: ++scleral icterus  GASTROINTESTINAL: abdomen is soft, nontender,  nondistended with normal active bowel sounds, no masses are appreciated  PSYCHIATRIC: appropriate mood and affect  RESPIRATORY: normal inspiratory effort with no increased work of breathing  NEUROLOGIC: patient is awake and alert  DERMATOLOGIC: skin is warm with no cyanosis, deep jaundice  LYMPHATIC: no periumbilical lymphadenopathy     Results Review:   I reviewed the patient's new clinical results.    Results from last 7 days   Lab Units 08/27/23  0540   WBC 10*3/mm3 4.94   HEMOGLOBIN g/dL 13.2   HEMATOCRIT % 36.4   PLATELETS 10*3/mm3 330     Results from last 7 days   Lab Units 08/27/23  0540 08/26/23  2156   SODIUM mmol/L 138 133*   POTASSIUM mmol/L 2.8* 3.5   CHLORIDE mmol/L 103 97*   CO2 mmol/L 19.0* 17.0*   BUN mg/dL 12 14   CREATININE mg/dL 0.89 0.93   CALCIUM mg/dL 8.9 9.2   BILIRUBIN mg/dL  --  33.2*   ALK PHOS U/L  --  284*   ALT (SGPT) U/L  --  144*   AST (SGOT) U/L  --  163*   GLUCOSE mg/dL 118* 131*     Results from last 7 days   Lab Units 08/27/23  0540 08/26/23  2156   INR  2.01* 1.72*     No results found for: LIPASE    Radiology:  CT Abdomen Pelvis Without Contrast   Final Result   1.  Findings of biliary obstruction with severe intra and extrahepatic   bili ductal dilation. The common bile duct becomes abruptly decompressed   as it projects through the pancreatic head. Gallbladder is also   distended. While findings are incompletely characterized, they are most   concerning for underlying pancreatic malignancy and further evaluation   with MRCP versus ERCP is recommended to further characterize.   2.  A few nodular areas of pulmonary opacification of the bilateral lung   bases which given the above findings are nonspecific. At least continued   close interval follow-up is recommended.   3.  Left adnexal cystic lesion measuring up to 5.7 cm, incompletely   evaluated. Further evaluation with pelvic sonogram is recommended to   exclude neoplasm.   4.  Other findings as above.           This report  was finalized on 8/26/2023 11:19 PM by Dr. Pierre Garcia M.D.          MRI abdomen w contrast mrcp    (Results Pending)       Assessment & Plan   Active Hospital Problems    Diagnosis     **Biliary obstruction        Assessment:  Obstructive jaundice.  CT shows evidence of significantly dilated intra and extra attic ducts.  No fever or elevated white blood cell count or right upper quadrant pain to indicate cholangitis.  Ammonia level is normal.  Elevated transaminases secondary to biliary obstruction  Coagulopathy.  Mildly increased pro time.  This could be nutritional or related to hepatic dysfunction.  Weight loss of around 15 pounds per the patient.  Pruritus    Plan:  Plan MRCP today as I have a strong suspicion for either pancreatic malignancy or cholangiocarcinoma.  Check CA 19-9.  May give a dose of vitamin K just to ensure the mildly elevated pro time is not simply nutritional.  Patient does not need urgent ERCP today as she does not have evidence of cholangitis.  She may need ERCP with brushings for further diagnostic evaluation as well as for stent for decompression pending results of MRCP.      I discussed the patients findings and my recommendations with patient, family, and nursing staff.    MD Raghu Wade M.D.  [facility], [place], operated by [facility] [id number] [place] [address]  Office: [phone]

## 2023-08-27 NOTE — ED TRIAGE NOTES
Pt to Er from home via [facility]. Pt only c/o body wide itching ongoing for several days.     EMS reports on their arrival pt was tachycardic with HR in 150s. Pt also hypertensive. Pt is also very jaundiced, unknown for how long.

## 2023-08-27 NOTE — PLAN OF CARE
Goal Outcome Evaluation:            New admit for Biliary Obstruction.Benadryl ordered *1 for itching/sleep,effective.Skin jaundiced.GI consulted.Currently NPO with sips of H20 with meds.Med Rec. completed with [facility] pharmacist, states pt is not compliant with taking meds,has not refilled any prescription in a while.BP elevated.Started on IV fluids,sats stable on RA,up ad henrique,[facility]CT[facility].       0730 : K 2.8.  Called [facility].Awaiting response.

## 2023-08-27 NOTE — ED PROVIDER NOTES
EMERGENCY DEPARTMENT ENCOUNTER    Room Number:  E459/1  Date seen:  8/27/2023  PCP: Ct Alford APRN  Historian(s): Patient, paramedic, patient's friend      HPI:  Chief Complaint: Confusion, jaundice, tachycardia, itching  A complete HPI/ROS/PMH/PSH/SH/FH are unobtainable / limited due to: Patient's confusion  Context: Lizzie Bose is a 78 y.o. female who presents to the ED via EMS from home for evaluation of confusion and jaundice and tachycardia.  Patient apparently lives at home by herself.  Her son came to visit her today.  She apparently has been without electricity in her home for the past 2 days.  He was concerned because she was extremely jaundiced and acting somewhat confused.  Paramedics were called to the house.  They found her to be hypertensive and tachycardic with a heart rate initially in the 140s to 150s range.  Patient herself does not have any specific complaints.  She says she has not had much appetite recently and has been wanting to go to [facility] so she can get some boost shakes.  However she denies abdominal pain or vomiting or diarrhea.  Apart from generalized itching, she really has no other specific complaints at this time either.      PAST MEDICAL HISTORY  Active Ambulatory Problems     Diagnosis Date Noted    PAT (obstructive sleep apnea)     Lumbar pain 03/21/2019    Primary osteoarthritis of left knee 02/11/2020    Primary osteoarthritis of right knee 02/24/2020    Benign essential hypertension 09/04/2020    Mixed hyperlipidemia 09/04/2020    Vitamin D deficiency 09/04/2020    Age-related osteoporosis without current pathological fracture 09/10/2020    Hyperinsulinism 09/10/2020    Chronic insomnia 09/10/2020    Attention deficit hyperactivity disorder (ADHD), combined type 09/10/2020    Dyspnea on exertion 09/15/2020    Dyslipidemia 03/25/2022     Resolved Ambulatory Problems     Diagnosis Date Noted    No Resolved Ambulatory Problems     Past Medical History:   Diagnosis Date     ADD (attention deficit disorder)     Arthritis     Depression     High blood pressure     History of Clostridioides difficile infection     Hyperlipidemia     Low back pain     Right knee pain     Sleep apnea          PAST SURGICAL HISTORY  Past Surgical History:   Procedure Laterality Date    APPENDECTOMY      CARPAL TUNNEL RELEASE Right     CATARACT EXTRACTION Bilateral     HAMMER TOE REPAIR Left     HIP ARTHROPLASTY Left     TOTAL KNEE ARTHROPLASTY Right 7/13/2020    Procedure: TOTAL KNEE ARTHROPLASTY;  Surgeon: Krish Ramirez MD;  Location: [facility] OR;  Service: Orthopedics;  Laterality: Right;         FAMILY HISTORY  Family History   Problem Relation Age of Onset    Diabetes Brother     Hypertension Brother     Clotting disorder Son     [place] Hyperthermia Neg Hx          SOCIAL HISTORY  Social History     Socioeconomic History    Marital status:    Tobacco Use    Smoking status: Never    Smokeless tobacco: Never   Substance and Sexual Activity    Alcohol use: No    Drug use: Never         ALLERGIES  Atorvastatin        REVIEW OF SYSTEMS  Review of Systems   Constitutional:  Positive for appetite change. Negative for activity change and fever.   HENT: Negative.     Eyes:  Negative for pain and visual disturbance.   Respiratory:  Negative for cough and shortness of breath.    Cardiovascular:  Negative for chest pain.   Gastrointestinal:  Negative for abdominal pain and blood in stool.   Genitourinary:  Negative for dysuria and hematuria.   Skin:  Positive for color change.   Neurological:  Negative for syncope and headaches.   All other systems reviewed and are negative.         PHYSICAL EXAM  ED Triage Vitals [08/26/23 2110]   Temp Heart Rate Resp BP SpO2   97.9 °F (36.6 °C) (!) 124 18 (!) 172/101 98 %      Temp src Heart Rate Source Patient Position BP Location FiO2 (%)   -- -- -- -- --       Physical Exam      GENERAL: Older lady, appears ill, no diaphoresis  HENT: nares patent, normocephalic and  atraumatic  EYES:  scleral icterus is present bilaterally, EOMI, normal conjunctivae  CV: regular rhythm, normal rate, normal distal pulses  RESPIRATORY: normal effort, no stridor lungs clear to auscultation bilaterally  ABDOMEN: soft, nontender in all quadrants  MUSCULOSKELETAL: no deformity, no asymmetry  NEURO: alert, moves all extremities, follows commands, no focal motor deficits apparent, speech is clear  PSYCH:  calm, cooperative, impaired insight/judgment  SKIN: warm, dry    Vital signs and nursing notes reviewed.        LAB RESULTS  Recent Results (from the past 24 hour(s))   ECG 12 Lead Altered Mental Status    Collection Time: 08/26/23  9:50 PM   Result Value Ref Range    QT Interval 267 ms    QTC Interval 398 ms   Comprehensive Metabolic Panel    Collection Time: 08/26/23  9:56 PM    Specimen: Blood   Result Value Ref Range    Glucose 131 (H) 65 - 99 mg/dL    BUN 14 8 - 23 mg/dL    Creatinine 0.93 0.57 - 1.00 mg/dL    Sodium 133 (L) 136 - 145 mmol/L    Potassium 3.5 3.5 - 5.2 mmol/L    Chloride 97 (L) 98 - 107 mmol/L    CO2 17.0 (L) 22.0 - 29.0 mmol/L    Calcium 9.2 8.6 - 10.5 mg/dL    Total Protein 6.9 6.0 - 8.5 g/dL    Albumin 3.7 3.5 - 5.2 g/dL    ALT (SGPT) 144 (H) 1 - 33 U/L    AST (SGOT) 163 (H) 1 - 32 U/L    Alkaline Phosphatase 284 (H) 39 - 117 U/L    Total Bilirubin 33.2 (H) 0.0 - 1.2 mg/dL    Globulin 3.2 gm/dL    A/G Ratio 1.2 g/dL    BUN/Creatinine Ratio 15.1 7.0 - 25.0    Anion Gap 19.0 (H) 5.0 - 15.0 mmol/L    eGFR 62.3 >60.0 mL/min/1.73   Protime-INR    Collection Time: 08/26/23  9:56 PM    Specimen: Blood   Result Value Ref Range    Protime 20.4 (H) 11.7 - 14.2 Seconds    INR 1.72 (H) 0.90 - 1.10   aPTT    Collection Time: 08/26/23  9:56 PM    Specimen: Blood   Result Value Ref Range    PTT 32.3 22.7 - 35.4 seconds   Urinalysis With Microscopic If Indicated (No Culture) - Urine, Clean Catch    Collection Time: 08/26/23  9:56 PM    Specimen: Urine, Clean Catch   Result Value Ref Range     Color, UA Dark Yellow (A) Yellow, Straw    Appearance, UA Clear Clear    pH, UA 6.5 5.0 - 8.0    Specific Gravity, UA <1.005 (L) 1.005 - 1.030    Glucose, UA Negative Negative    Ketones, UA Negative Negative    Bilirubin, UA Large (3+) (A) Negative    Blood, UA Negative Negative    Protein, UA Negative Negative    Leuk Esterase, UA Moderate (2+) (A) Negative    Nitrite, UA Negative Negative    Urobilinogen, UA 0.2 E.U./dL 0.2 - 1.0 E.U./dL   Ethanol    Collection Time: 08/26/23  9:56 PM    Specimen: Blood   Result Value Ref Range    Ethanol <10 0 - 10 mg/dL    Ethanol % <0.010 %   Urine Drug Screen - Urine, Clean Catch    Collection Time: 08/26/23  9:56 PM    Specimen: Urine, Clean Catch   Result Value Ref Range    Amphet/Methamphet, Screen Negative Negative    Barbiturates Screen, Urine Negative Negative    Benzodiazepine Screen, Urine Negative Negative    Cocaine Screen, Urine Negative Negative    Opiate Screen Negative Negative    THC, Screen, Urine Negative Negative    Methadone Screen, Urine Negative Negative    Oxycodone Screen, Urine Negative Negative    Fentanyl, Urine Negative Negative   Ammonia    Collection Time: 08/26/23  9:56 PM    Specimen: Blood   Result Value Ref Range    Ammonia 32 11 - 51 umol/L   Single High Sensitivity Troponin T    Collection Time: 08/26/23  9:56 PM    Specimen: Blood   Result Value Ref Range    HS Troponin T 11 (H) <10 ng/L   Urinalysis, Microscopic Only - Urine, Clean Catch    Collection Time: 08/26/23  9:56 PM    Specimen: Urine, Clean Catch   Result Value Ref Range    RBC, UA 0-2 None Seen, 0-2 /HPF    WBC, UA 0-2 None Seen, 0-2 /HPF    Bacteria, UA None Seen None Seen /HPF    Squamous Epithelial Cells, UA 3-6 (A) None Seen, 0-2 /HPF    Hyaline Casts, UA None Seen None Seen /LPF    Methodology Manual Light Microscopy        Ordered the above labs and reviewed the results.        RADIOLOGY  CT Abdomen Pelvis Without Contrast    Result Date: 8/26/2023  CT ABDOMEN AND PELVIS  WITHOUT IV CONTRAST  HISTORY: Jaundice  TECHNIQUE: Radiation dose reduction techniques were utilized, including automated exposure control and exposure modulation based on body size. 3 mm images were obtained through the abdomen and pelvis without IV contrast.  COMPARISON: None  FINDINGS: A few nodular areas of pulmonary opacification are present within the bilateral lung bases. There is a large hiatal hernia. No findings of small bowel obstruction. The appendix surgically absent. There is severe intra and extrahepatic bili ductal dilation. The gallbladder is distended. The common bile duct becomes abruptly decompressed as it projects into the pancreatic head. The spleen, adrenal glands and kidneys have an unremarkable noncontrast CT appearance. No hydronephrosis. Evaluation of the pelvis is suboptimal due to streak artifact from left total hip arthroplasty.  The transverse colon is decompressed; however, appears to demonstrate mild wall thickening. There is a left adnexal cystic lesion measuring up to 5.7 x 4.1 cm. No free intraperitoneal air is seen.      1.  Findings of biliary obstruction with severe intra and extrahepatic bili ductal dilation. The common bile duct becomes abruptly decompressed as it projects through the pancreatic head. Gallbladder is also distended. While findings are incompletely characterized, they are most concerning for underlying pancreatic malignancy and further evaluation with MRCP versus ERCP is recommended to further characterize. 2.  A few nodular areas of pulmonary opacification of the bilateral lung bases which given the above findings are nonspecific. At least continued close interval follow-up is recommended. 3.  Left adnexal cystic lesion measuring up to 5.7 cm, incompletely evaluated. Further evaluation with pelvic sonogram is recommended to exclude neoplasm. 4.  Other findings as above.   This report was finalized on 8/26/2023 11:19 PM by Dr. Pierre Garcia M.D.       Ordered  the above noted radiological studies. Reviewed by me in PACS.          PROCEDURES  Procedures        MEDICATIONS GIVEN IN ER  Medications   sodium chloride 0.9 % flush 10 mL (has no administration in time range)   sodium chloride 0.9 % flush 10 mL (has no administration in time range)   sodium chloride 0.9 % flush 10 mL (has no administration in time range)   sodium chloride 0.9 % infusion 40 mL (has no administration in time range)   sennosides-docusate (PERICOLACE) 8.6-50 MG per tablet 2 tablet (has no administration in time range)     And   polyethylene glycol (MIRALAX) packet 17 g (has no administration in time range)     And   bisacodyl (DULCOLAX) EC tablet 5 mg (has no administration in time range)     And   bisacodyl (DULCOLAX) suppository 10 mg (has no administration in time range)   nitroglycerin (NITROSTAT) SL tablet 0.4 mg (has no administration in time range)   sodium chloride 0.9 % infusion (has no administration in time range)   ondansetron (ZOFRAN) injection 4 mg (has no administration in time range)   lactated ringers bolus 1,000 mL (1,000 mL Intravenous New Bag 8/26/23 2200)           MEDICAL DECISION MAKING, PROGRESS, and CONSULTS    All labs have been independently reviewed by me.  All radiology studies have been reviewed by me and I have also reviewed the radiology report.   EKG's independently viewed and interpreted by me.  Discussion below represents my analysis of pertinent findings related to patient's condition, differential diagnosis, treatment plan and final disposition.    EKG           EKG time/Interp time: 2150/2152  Rhythm/Rate: Sinus rhythm, 133 ms  P waves and UT: Present, 163 ms  QRS, axis: 125 ms, normal axis  ST and T waves: Nonspecific lateral lead T wave flattening and slight ST depression which may be rate related phenomenon.  No ST segment elevations.    Independently interpreted by me contemporaneously with treatment    Additional sources:  - Discussed/ obtained information  from independent historians: I discussed with paramedics to receive report of patient's condition on arrival and treatments initiated in route to the hospital.  Also discussed with patient's friend who accompanies her at this time.    - External (non-ED) record review: I reviewed the previous hospital discharge summary from 2020 when she was admitted here for a total knee arthroplasty because of right knee osteoarthritis.    - Chronic or social conditions impacting care: Elderly patient who lives at home by herself.  I am told by her friend that her  recently  and that she has been drinking alcohol heavily.      Orders placed during this visit:  Orders Placed This Encounter   Procedures    CT Abdomen Pelvis Without Contrast    Comprehensive Metabolic Panel    Protime-INR    aPTT    Urinalysis With Microscopic If Indicated (No Culture) - Urine, Clean Catch    Ethanol    Urine Drug Screen - Urine, Clean Catch    Ammonia    Single High Sensitivity Troponin T    Urinalysis, Microscopic Only - Urine, Clean Catch    Basic Metabolic Panel    CBC Auto Differential    Protime-INR    NPO Diet NPO Type: Sips with Meds    Monitor Blood Pressure    Pulse Oximetry, Continuous    Vital Signs    Intake & Output    Weigh Patient    Oral Care    Place Sequential Compression Device    Maintain Sequential Compression Device    Telemetry - Maintain IV Access    Telemetry - Place Orders & Notify Provider of Results When Patient Experiences Acute Chest Pain, Dysrhythmia or Respiratory Distress    May Be Off Telemetry for Tests    Up With Assistance    Neuro Checks    Code Status and Medical Interventions:    GUILLERMINA (on-call MD unless specified) Details    Inpatient Gastroenterology Consult    Inpatient Consult to Advance Care Planning    Inpatient Case Management  Consult    ECG 12 Lead Altered Mental Status    Insert Peripheral IV    Insert Peripheral IV    Inpatient Admission           Differential  diagnosis includes but is not limited to:    Liver failure, alcohol intoxication, hepatic encephalopathy, UTI, hypoglycemia, dehydration, heat exposure      Independent interpretation of labs, radiology studies, and discussions with consultants:  ED Course as of 08/27/23 0212   Sat Aug 26, 2023   2141 Patient is significantly jaundiced and certainly my clinical concern is for acute liver failure.  Based on history it sounds like this is probably an alcohol related etiology.  She indicates that she does not really want to be here today but I have convinced her that she truly needs medical evaluation and for now she remains cooperative. [RANCHO]   2353 I independently interpreted the abdomen CT study and my findings are: No free air, no small bowel obstruction pattern.  There are signs of biliary obstruction with gallbladder distention   [RANCHO]   2353 ALT (SGPT)(!): 144 [RANCHO]   2354 AST (SGOT)(!): 163 [RANCHO]   2354 Alkaline Phosphatase(!): 284 [RANCHO]   2354 Total Bilirubin(!): 33.2 [RANCHO]   2354 Bilirubin is markedly elevated.  CT scan clearly demonstrates biliary obstructive process.  She will need admission with GI consult for probable ERCP.  Paging [facility] now to request admission. [RANCHO]   2357 I just discussed with Nimisha from [facility].  She agrees to accept the patient to the hospitalist service for further medical management tonight. [RANCHO]      ED Course User Index  [RANCHO] Catalino Munoz MD         DIAGNOSIS  Final diagnoses:   Jaundice   Biliary obstruction   Adnexal cyst   Tachycardia         DISPOSITION  Admit to [facility]        Latest Documented Vital Signs:  As of 02:12 EDT  BP- (!) 155/105 HR- 117 Temp- 97.9 °F (36.6 °C) (Oral) O2 sat- 100%              --    Please note that portions of this were completed with a voice recognition program.       Note Disclaimer: At [facility], we believe that sharing information builds trust and better relationships. You are receiving this note because you are receiving care at [facility]  or recently visited. It is possible you will see health information before a provider has talked with you about it. This kind of information can be easy to misunderstand. To help you fully understand what it means for your health, we urge you to discuss this note with your provider.             Catalino Munoz MD  08/27/23 0212       Catalino Munoz MD  08/27/23 0219

## 2023-08-27 NOTE — PLAN OF CARE
Goal Outcome Evaluation:  Plan of Care Reviewed With: patient        Progress: no change  Outcome Evaluation: Patient frustrated this morning about not being able to eat. MRCP done this afternoon and diet has been ordered. CT head also done. Patient with some intermittent confusion and forgetful. 2/3 doses of KCL replacement given, another dose due this evening and recheck due at 0004. K2.8 this morning. PRN atarax given for itching. Pt very jaundice. New IV obtained today. IVF changed to LR.
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
## 2023-08-28 ENCOUNTER — TELEPHONE (OUTPATIENT)
Dept: GASTROENTEROLOGY | Facility: CLINIC | Age: 78
End: 2023-08-28
Payer: MEDICARE

## 2023-08-28 LAB
ALBUMIN SERPL-MCNC: 2.9 G/DL (ref 3.5–5.2)
ALBUMIN/GLOB SERPL: 1.2 G/DL
ALP SERPL-CCNC: 196 U/L (ref 39–117)
ALT SERPL W P-5'-P-CCNC: 112 U/L (ref 1–33)
ANION GAP SERPL CALCULATED.3IONS-SCNC: 11.8 MMOL/L (ref 5–15)
AST SERPL-CCNC: 130 U/L (ref 1–32)
BILIRUB CONJ SERPL-MCNC: >10 MG/DL (ref 0–0.3)
BILIRUB SERPL-MCNC: 28.8 MG/DL (ref 0–1.2)
BUN SERPL-MCNC: 18 MG/DL (ref 8–23)
BUN/CREAT SERPL: 23.1 (ref 7–25)
CALCIUM SPEC-SCNC: 8.4 MG/DL (ref 8.6–10.5)
CANCER AG19-9 SERPL-ACNC: 2836 U/ML
CHLORIDE SERPL-SCNC: 104 MMOL/L (ref 98–107)
CO2 SERPL-SCNC: 18.2 MMOL/L (ref 22–29)
CREAT SERPL-MCNC: 0.78 MG/DL (ref 0.57–1)
DEPRECATED RDW RBC AUTO: 63 FL (ref 37–54)
EGFRCR SERPLBLD CKD-EPI 2021: 76.9 ML/MIN/1.73
ERYTHROCYTE [DISTWIDTH] IN BLOOD BY AUTOMATED COUNT: 20.6 % (ref 12.3–15.4)
GLOBULIN UR ELPH-MCNC: 2.5 GM/DL
GLUCOSE SERPL-MCNC: 116 MG/DL (ref 65–99)
HCT VFR BLD AUTO: 31.9 % (ref 34–46.6)
HGB BLD-MCNC: 11.4 G/DL (ref 12–15.9)
INR PPP: 2.73 (ref 0.9–1.1)
LIPASE SERPL-CCNC: 108 U/L (ref 13–60)
MCH RBC QN AUTO: 29.8 PG (ref 26.6–33)
MCHC RBC AUTO-ENTMCNC: 35.7 G/DL (ref 31.5–35.7)
MCV RBC AUTO: 83.5 FL (ref 79–97)
PLATELET # BLD AUTO: 301 10*3/MM3 (ref 140–450)
PMV BLD AUTO: 9.8 FL (ref 6–12)
POTASSIUM SERPL-SCNC: 4.2 MMOL/L (ref 3.5–5.2)
POTASSIUM SERPL-SCNC: 4.9 MMOL/L (ref 3.5–5.2)
PROT SERPL-MCNC: 5.4 G/DL (ref 6–8.5)
PROTHROMBIN TIME: 29.5 SECONDS (ref 11.7–14.2)
RBC # BLD AUTO: 3.82 10*6/MM3 (ref 3.77–5.28)
SODIUM SERPL-SCNC: 134 MMOL/L (ref 136–145)
WBC NRBC COR # BLD: 6.04 10*3/MM3 (ref 3.4–10.8)

## 2023-08-28 PROCEDURE — 84132 ASSAY OF SERUM POTASSIUM: CPT | Performed by: INTERNAL MEDICINE

## 2023-08-28 PROCEDURE — 85610 PROTHROMBIN TIME: CPT | Performed by: INTERNAL MEDICINE

## 2023-08-28 PROCEDURE — 99232 SBSQ HOSP IP/OBS MODERATE 35: CPT | Performed by: INTERNAL MEDICINE

## 2023-08-28 PROCEDURE — 83690 ASSAY OF LIPASE: CPT | Performed by: INTERNAL MEDICINE

## 2023-08-28 PROCEDURE — 85027 COMPLETE CBC AUTOMATED: CPT | Performed by: NURSE PRACTITIONER

## 2023-08-28 PROCEDURE — 86301 IMMUNOASSAY TUMOR CA 19-9: CPT | Performed by: INTERNAL MEDICINE

## 2023-08-28 PROCEDURE — 80053 COMPREHEN METABOLIC PANEL: CPT | Performed by: INTERNAL MEDICINE

## 2023-08-28 PROCEDURE — 82248 BILIRUBIN DIRECT: CPT | Performed by: INTERNAL MEDICINE

## 2023-08-28 RX ORDER — PHYTONADIONE 5 MG/1
5 TABLET ORAL ONCE
Status: COMPLETED | OUTPATIENT
Start: 2023-08-28 | End: 2023-08-28

## 2023-08-28 RX ORDER — ZOLPIDEM TARTRATE 5 MG/1
2.5 TABLET ORAL NIGHTLY PRN
Status: DISCONTINUED | OUTPATIENT
Start: 2023-08-28 | End: 2023-08-31 | Stop reason: HOSPADM

## 2023-08-28 RX ORDER — CHOLECALCIFEROL (VITAMIN D3) 125 MCG
5 CAPSULE ORAL NIGHTLY PRN
Status: DISCONTINUED | OUTPATIENT
Start: 2023-08-28 | End: 2023-08-28

## 2023-08-28 RX ADMIN — SODIUM CHLORIDE, POTASSIUM CHLORIDE, SODIUM LACTATE AND CALCIUM CHLORIDE 100 ML/HR: 600; 310; 30; 20 INJECTION, SOLUTION INTRAVENOUS at 20:50

## 2023-08-28 RX ADMIN — ZOLPIDEM TARTRATE 2.5 MG: 5 TABLET ORAL at 20:48

## 2023-08-28 RX ADMIN — METOPROLOL SUCCINATE 25 MG: 25 TABLET, EXTENDED RELEASE ORAL at 08:24

## 2023-08-28 RX ADMIN — SODIUM CHLORIDE, POTASSIUM CHLORIDE, SODIUM LACTATE AND CALCIUM CHLORIDE 100 ML/HR: 600; 310; 30; 20 INJECTION, SOLUTION INTRAVENOUS at 03:21

## 2023-08-28 RX ADMIN — PHYTONADIONE 5 MG: 5 TABLET ORAL at 17:43

## 2023-08-28 RX ADMIN — HYDROXYZINE HYDROCHLORIDE 25 MG: 25 TABLET ORAL at 02:20

## 2023-08-28 RX ADMIN — Medication 10 ML: at 08:25

## 2023-08-28 RX ADMIN — HYDROXYZINE HYDROCHLORIDE 25 MG: 25 TABLET ORAL at 20:48

## 2023-08-28 RX ADMIN — Medication 10 ML: at 20:48

## 2023-08-28 NOTE — PROGRESS NOTES
[facility]  Inpatient Progress Note    Reason for Follow Up: Obstructive jaundice    Subjective     Interval History:   Patient denies abdominal pain or nausea.  Tolerating a diet.  She really is asymptomatic with the exception of jaundice.    Current Facility-Administered Medications:     sennosides-docusate (PERICOLACE) 8.6-50 MG per tablet 2 tablet, 2 tablet, Oral, BID, 2 tablet at 08/27/23 2059 **AND** polyethylene glycol (MIRALAX) packet 17 g, 17 g, Oral, Daily PRN **AND** bisacodyl (DULCOLAX) EC tablet 5 mg, 5 mg, Oral, Daily PRN **AND** bisacodyl (DULCOLAX) suppository 10 mg, 10 mg, Rectal, Daily PRN, Nimisha Kumar APRN    Calcium Replacement - Follow Nurse / BPA Driven Protocol, , Does not apply, Dominik CHRISTIANSON Patrick D, MD    hydrOXYzine (ATARAX) tablet 25 mg, 25 mg, Oral, TID PRN, Ankur Lehman MD, 25 mg at 08/28/23 0220    lactated ringers infusion, 100 mL/hr, Intravenous, Continuous, Ankur Lehman MD, Last Rate: 100 mL/hr at 08/28/23 0321, 100 mL/hr at 08/28/23 0321    Magnesium Standard Dose Replacement - Follow Nurse / BPA Driven Protocol, , Does not apply, Dominik CHRISTIANSON Patrick D, MD    metoprolol succinate XL (TOPROL-XL) 24 hr tablet 25 mg, 25 mg, Oral, Q24H, Ankur Lehman MD, 25 mg at 08/28/23 0824    nitroglycerin (NITROSTAT) SL tablet 0.4 mg, 0.4 mg, Sublingual, Q5 Min PRN, Nimisha Kumar APRN    ondansetron (ZOFRAN) injection 4 mg, 4 mg, Intravenous, Q6H PRN, Nimisha Kumar APRN    Phosphorus Replacement - Follow Nurse / BPA Driven Protocol, , Does not apply, Dominik CHRISTIANSON Patrick D, MD    Potassium Replacement - Follow Nurse / BPA Driven Protocol, , Does not apply, Dominik CHRISTIANSON Patrick D, MD    [COMPLETED] Insert Peripheral IV, , , Once **AND** sodium chloride 0.9 % flush 10 mL, 10 mL, Intravenous, PRN, Catalino Munoz MD    sodium chloride 0.9 % flush 10 mL, 10 mL, Intravenous, Q12H, Nimisha Kumar, APRN, 10 mL at 08/28/23  0825    sodium chloride 0.9 % flush 10 mL, 10 mL, Intravenous, PRN, Nimisha Kumar APRN    sodium chloride 0.9 % infusion 40 mL, 40 mL, Intravenous, PRN, Nimisha Kumar APRN    Facility-Administered Medications Ordered in Other Encounters:     Chlorhexidine Gluconate 2 % pads 2 each, 2 pad , Apply externally, BID, Krish Ramirez MD  Review of Systems:    The following systems were reviewed and negative;  constitution and gastrointestinal    Objective     Vital Signs  Temp:  [97.5 °F (36.4 °C)-98.1 °F (36.7 °C)] 97.7 °F (36.5 °C)  Heart Rate:  [66-81] 70  Resp:  [16-17] 16  BP: (128-157)/(74-91) 142/80  Body mass index is 21.63 kg/m².  No intake or output data in the 24 hours ending 08/28/23 1545  No intake/output data recorded.     Physical Exam:   General: patient awake, alert and cooperative   Eyes: Normal lids and lashes, + scleral icterus   Neck: supple, normal ROM   Skin: warm and dry, aundiced   Pulm: regular and unlabored   Abdomen: soft, nontender, nondistended; normal bowel sounds   Extremities: no rash or edema   Psychiatric: Normal mood and behavior; memory intact     Results Review:     I reviewed the patient's new clinical results.    Results from last 7 days   Lab Units 08/28/23  0458 08/27/23  0540   WBC 10*3/mm3 6.04 4.94   HEMOGLOBIN g/dL 11.4* 13.2   HEMATOCRIT % 31.9* 36.4   PLATELETS 10*3/mm3 301 330     Results from last 7 days   Lab Units 08/28/23  0458 08/28/23  0028 08/27/23  0540 08/26/23  2156   SODIUM mmol/L 134*  --  138 133*   POTASSIUM mmol/L 4.2 4.9 2.8* 3.5   CHLORIDE mmol/L 104  --  103 97*   CO2 mmol/L 18.2*  --  19.0* 17.0*   BUN mg/dL 18  --  12 14   CREATININE mg/dL 0.78  --  0.89 0.93   CALCIUM mg/dL 8.4*  --  8.9 9.2   BILIRUBIN mg/dL 28.8*  --   --  33.2*   ALK PHOS U/L 196*  --   --  284*   ALT (SGPT) U/L 112*  --   --  144*   AST (SGOT) U/L 130*  --   --  163*   GLUCOSE mg/dL 116*  --  118* 131*     Results from last 7 days   Lab Units 08/28/23  6337  08/27/23  0540 08/26/23  2156   INR  2.73* 2.01* 1.72*     Lab Results   Lab Value Date/Time    LIPASE 108 (H) 08/28/2023 0458       Radiology:  CT Head Without Contrast   Final Result         Electronically signed by Andrea Lozano MD on 08-27-23 at 1809      MRI abdomen w wo contrast mrcp   Final Result      CT Abdomen Pelvis Without Contrast   Final Result   1.  Findings of biliary obstruction with severe intra and extrahepatic   bili ductal dilation. The common bile duct becomes abruptly decompressed   as it projects through the pancreatic head. Gallbladder is also   distended. While findings are incompletely characterized, they are most   concerning for underlying pancreatic malignancy and further evaluation   with MRCP versus ERCP is recommended to further characterize.   2.  A few nodular areas of pulmonary opacification of the bilateral lung   bases which given the above findings are nonspecific. At least continued   close interval follow-up is recommended.   3.  Left adnexal cystic lesion measuring up to 5.7 cm, incompletely   evaluated. Further evaluation with pelvic sonogram is recommended to   exclude neoplasm.   4.  Other findings as above.           This report was finalized on 8/26/2023 11:19 PM by Dr. Pierre Garcia M.D.          CT Chest With Contrast Diagnostic    (Results Pending)   CT Abdomen Pelvis With Contrast    (Results Pending)       Assessment & Plan     Active Hospital Problems    Diagnosis     **Biliary obstruction     Elevated liver enzymes     Dyslipidemia     Chronic insomnia     Benign essential hypertension     PAT (obstructive sleep apnea)        All problems are new to me today  Assessment:  Obstructive jaundice-MRCP with obstructing mass in the uncinate causing intra and extrahepatic biliary ductal dilatation, elevated CA 19-9  Elevated transaminases secondary to #1  Coagulopathy  15 pound weight loss  Pruritus      Plan:  MRCP and lab findings discussed with patient and her  son at bedside.  Case discussed with Dr. Mg.  Plan for ERCP tomorrow for further evaluation, brushings, biliary stent placement  Repeat INR in the morning-we will give a dose of vitamin K today    I discussed the patients findings and my recommendations with patient, family, and nursing staff.    Denice Kirk MD

## 2023-08-28 NOTE — CASE MANAGEMENT/SOCIAL WORK
"Discharge Planning Assessment  [facility]     Patient Name: Lizzie Bose  MRN: [id number]  Today's Date: 8/28/2023    Admit Date: 8/26/2023    Plan: Pending   Discharge Needs Assessment       Row Name 08/28/23 1248       Living Environment    People in Home alone    Current Living Arrangements home    Potentially Unsafe Housing Conditions other (see comments)  hoarding situation    Primary Care Provided by self    Provides Primary Care For no one    Family Caregiver if Needed child(bel), adult    Able to Return to Prior Arrangements yes       Resource/Environmental Concerns    Resource/Environmental Concerns environmental       Transition Planning    Patient/Family Anticipates Transition to home    Patient/Family Anticipated Services at Transition none    Transportation Anticipated family or friend will provide       Discharge Needs Assessment    Equipment Currently Used at Home cpap    Concerns to be Addressed discharge planning;home safety    Current Discharge Risk substance use/abuse                   Discharge Plan       Row Name 08/28/23 1249       Plan    Plan Pending    Patient/Family in Agreement with Plan yes    Plan Comments CCP spoke with patient and friend at bedside; explained role, verified facesheet, and discussed dc plan. Patient has a cpap at home but does not use it. She lives alone in a 1 level home with \"not a lot\" of steps to enter. She has used HH but cannot remember which agency. She has been to [facility] in the past. Patient states the plan is home but she is unsure what she will need at discharge. CCP to continue to follow for dc planning needs/recommendations pending clinical course. KHADIJAH, MALACHI                  Continued Care and Services - Admitted Since 8/26/2023    Coordination has not been started for this encounter.          Demographic Summary       Row Name 08/28/23 1247       General Information    Admission Type inpatient    Arrived From home    Referral Source admission list "    Reason for Consult discharge planning    Preferred Language English       Contact Information    Permission Granted to Share Info With family/designee                   Functional Status       Row Name 08/28/23 1248       Functional Status    Usual Activity Tolerance moderate    Current Activity Tolerance moderate       Functional Status, IADL    Medications independent    Meal Preparation independent    Housekeeping independent    Laundry independent    Shopping independent       Mental Status    General Appearance WDL WDL                   Psychosocial    No documentation.                  Abuse/Neglect    No documentation.                  Legal    No documentation.                  Substance Abuse    No documentation.                  Patient Forms    No documentation.                     MALACHI Michele

## 2023-08-28 NOTE — PLAN OF CARE
Goal Outcome Evaluation:      VSS. Pt up most of he night.PRN Atarax given for itches, effective. Pt anxious about MRCP result, still considering leaving AMA. WCTM.      0622 :Bilirubin elevated .

## 2023-08-28 NOTE — CASE MANAGEMENT/SOCIAL WORK
"Continued Stay Note  [facility]     Patient Name: Lizzie Bose  MRN: [id number]  Today's Date: 8/28/2023    Admit Date: 8/26/2023    Plan: Pending   Discharge Plan       Row Name 08/28/23 1534       Plan    Plan Comments CCP requested PT eval consult order through [facility]. CCP spoke with patient and son at bedside and provided a road to recovery booklet, SNF list, HH list, and [facility] brochure. MALACHI LUCIO      Row Name 08/28/23 1249       Plan    Plan Pending    Patient/Family in Agreement with Plan yes    Plan Comments CCP spoke with patient and friend at bedside; explained role, verified facesheet, and discussed dc plan. Patient has a cpap at home but does not use it. She lives alone in a 1 level home with \"not a lot\" of steps to enter. She has used HH but cannot remember which agency. She has been to [facility] in the past. Patient states the plan is home but she is unsure what she will need at discharge. CCP to continue to follow for dc planning needs/recommendations pending clinical course. MALACHI LUCIO                   Discharge Codes    No documentation.                       MALACHI Michele    "

## 2023-08-28 NOTE — PLAN OF CARE
Goal Outcome Evaluation:  Plan of Care Reviewed With: patient        Progress: no change  Outcome Evaluation: No c/o pain or soa.  Anxious about diagnosis and plan of care. Wants to go home. Friend at bedside and son arrived from out of town today. Plan on ERCP tomorrow evening. NPO after MN for solid food, but can have clear liquids (except jello) until 10am tomorrow. CT chest and abd ordered, has not been completed yet. Will receive dose of vitamin K this evening. PT/OT consulted.    1824-ERCP consent signed and in bedside chart.

## 2023-08-28 NOTE — CONSULTS
Meet with Pt about AD Consult and answered questions. Pt did not want to complete AD at this time, but left paper work with Pt for them to look over. If Pt does decide they want to finish AD please re-add consult.

## 2023-08-28 NOTE — PROGRESS NOTES
Name: Lizzie Bose ADMIT: 2023   : 1945  PCP: Ct Alford APRN    MRN: [id number] LOS: 1 days   AGE/SEX: 78 y.o. female  ROOM: [facility]/     Subjective   Subjective   Chief Complaint   Patient presents with    Hypertension    Rapid Heart Rate     We discussed the MRCP findings including pancreatic head mass and the likelihood that this represents pancreatic cancer.  Discussed possible next steps and that she would be discussing further with GI today about how best to go about diagnosis and relief of obstruction.  She asked about surgical procedures and we discussed needing to confirm diagnosis and complete staging.  Not reporting any abdominal pain currently.  No chest pain palpitations or shortness of breath.  No nausea vomiting or diarrhea.  No fevers or chills.     Objective   Objective   Vital Signs  Temp:  [97.5 °F (36.4 °C)-98.1 °F (36.7 °C)] 97.7 °F (36.5 °C)  Heart Rate:  [66-81] 70  Resp:  [16-17] 16  BP: (128-157)/(74-91) 142/80  SpO2:  [97 %-100 %] 100 %  on   ;   Device (Oxygen Therapy): room air  Body mass index is 21.63 kg/m².    Physical Exam  Vitals and nursing note reviewed.   Constitutional:       General: She is not in acute distress.     Appearance: She is ill-appearing. She is not diaphoretic.   HENT:      Head: Atraumatic.   Eyes:      General: Scleral icterus present.      Pupils: Pupils are equal, round, and reactive to light.   Cardiovascular:      Rate and Rhythm: Normal rate and regular rhythm.   Pulmonary:      Effort: Pulmonary effort is normal.      Breath sounds: No wheezing.   Abdominal:      General: There is no distension.      Palpations: Abdomen is soft.      Tenderness: There is no abdominal tenderness. There is no guarding or rebound.   Musculoskeletal:         General: No swelling or tenderness.   Skin:     General: Skin is dry.      Coloration: Skin is jaundiced.   Neurological:      Mental Status: She is alert.      Cranial Nerves: No cranial nerve deficit.    Psychiatric:         Mood and Affect: Mood normal.         Behavior: Behavior normal.     Results Review  I reviewed the patient's new clinical results.    Results from last 7 days   Lab Units 08/28/23  0458 08/27/23  0540   WBC 10*3/mm3 6.04 4.94   HEMOGLOBIN g/dL 11.4* 13.2   PLATELETS 10*3/mm3 301 330     Results from last 7 days   Lab Units 08/28/23  0458 08/28/23  0028 08/27/23  0540 08/26/23  2156   SODIUM mmol/L 134*  --  138 133*   POTASSIUM mmol/L 4.2 4.9 2.8* 3.5   CHLORIDE mmol/L 104  --  103 97*   CO2 mmol/L 18.2*  --  19.0* 17.0*   BUN mg/dL 18  --  12 14   CREATININE mg/dL 0.78  --  0.89 0.93   GLUCOSE mg/dL 116*  --  118* 131*   EGFR mL/min/1.73 76.9  --  65.6 62.3     Results from last 7 days   Lab Units 08/28/23  0458 08/26/23  2156   ALBUMIN g/dL 2.9* 3.7   BILIRUBIN mg/dL 28.8* 33.2*   ALK PHOS U/L 196* 284*   AST (SGOT) U/L 130* 163*   ALT (SGPT) U/L 112* 144*     Results from last 7 days   Lab Units 08/28/23  0458 08/27/23  0540 08/26/23  2156   CALCIUM mg/dL 8.4* 8.9 9.2   ALBUMIN g/dL 2.9*  --  3.7   MAGNESIUM mg/dL  --  3.4*  --          No results found for: HGBA1C, POCGLU    CT Abdomen Pelvis Without Contrast    Result Date: 8/26/2023  1.  Findings of biliary obstruction with severe intra and extrahepatic bili ductal dilation. The common bile duct becomes abruptly decompressed as it projects through the pancreatic head. Gallbladder is also distended. While findings are incompletely characterized, they are most concerning for underlying pancreatic malignancy and further evaluation with MRCP versus ERCP is recommended to further characterize. 2.  A few nodular areas of pulmonary opacification of the bilateral lung bases which given the above findings are nonspecific. At least continued close interval follow-up is recommended. 3.  Left adnexal cystic lesion measuring up to 5.7 cm, incompletely evaluated. Further evaluation with pelvic sonogram is recommended to exclude neoplasm. 4.  Other  findings as above.   This report was finalized on 8/26/2023 11:19 PM by Dr. Pierre Garcia M.D.      CT Head Without Contrast    Result Date: 8/27/2023  Electronically signed by Andrea Lozano MD on 08-27-23 at 1809     I have personally reviewed all medications:  Scheduled Medications  metoprolol succinate XL, 25 mg, Oral, Q24H  senna-docusate sodium, 2 tablet, Oral, BID  sodium chloride, 10 mL, Intravenous, Q12H      Infusions  lactated ringers, 100 mL/hr, Last Rate: 100 mL/hr (08/28/23 0321)      Diet  Diet: Regular/House Diet; Texture: Regular Texture (IDDSI 7); Fluid Consistency: Thin (IDDSI 0)    I have personally reviewed:  [x]  Laboratory   []  Microbiology   [x]  Radiology   [x]  EKG/Telemetry  []  Cardiology/Vascular   []  Pathology    []  Records       Assessment/Plan     Active Hospital Problems    Diagnosis  POA    **Biliary obstruction [K83.1]  Yes    Elevated liver enzymes [R74.8]  Unknown    Dyslipidemia [E78.5]  Yes    Chronic insomnia [F51.04]  Yes    Benign essential hypertension [I10]  Yes    PAT (obstructive sleep apnea) [G47.33]  Yes      Resolved Hospital Problems   No resolved problems to display.       78 y.o. female admitted with Biliary obstruction.    Biliary obstruction/painless jaundice: Pancreatic mass noted on MRCP.  CA 19-9 is very elevated.  She also has some distention of gallbladder but no current evidence of cholangitis with no fever right upper quadrant pain or leukocytosis.  Proceed with needed work-up, potential ERCP.  Her kidney function is stable so will order contrasted CT chest abdomen pelvis to continue staging for anticipated malignancy.  GI following.  Adnexal cyst: Pelvic ultrasound consideration after abdominal work-up is done.  Anxiety: Atarax as needed.  She did want to leave AMA earlier but then decided to stay.  Attempting to expedite her work-up as above.  Hypertension: She had stopped taking her home medicines prior to admission.  Prior beta-blocker was  resumed at a lower dose and blood pressure is acceptable acutely.  Ppx: SCD  Disposition: TBD    Expected discharge date/ time has not been documented.     Ankur Lehman MD  [facility]  08/28/23  13:46 EDT    Dictated portions of note using dragon dictation software.  Copied text in this note has been reviewed by me and remains accurate as of 08/28/23

## 2023-08-29 ENCOUNTER — ANESTHESIA (OUTPATIENT)
Dept: GASTROENTEROLOGY | Facility: HOSPITAL | Age: 78
DRG: 435 | End: 2023-08-29
Payer: MEDICARE

## 2023-08-29 ENCOUNTER — APPOINTMENT (OUTPATIENT)
Dept: CT IMAGING | Facility: HOSPITAL | Age: 78
DRG: 435 | End: 2023-08-29
Payer: MEDICARE

## 2023-08-29 ENCOUNTER — ANESTHESIA EVENT (OUTPATIENT)
Dept: GASTROENTEROLOGY | Facility: HOSPITAL | Age: 78
DRG: 435 | End: 2023-08-29
Payer: MEDICARE

## 2023-08-29 ENCOUNTER — APPOINTMENT (OUTPATIENT)
Dept: GENERAL RADIOLOGY | Facility: HOSPITAL | Age: 78
DRG: 435 | End: 2023-08-29
Payer: MEDICARE

## 2023-08-29 LAB
ALBUMIN SERPL-MCNC: 2.7 G/DL (ref 3.5–5.2)
ALBUMIN/GLOB SERPL: 1 G/DL
ALP SERPL-CCNC: 201 U/L (ref 39–117)
ALT SERPL W P-5'-P-CCNC: 101 U/L (ref 1–33)
ANION GAP SERPL CALCULATED.3IONS-SCNC: 12 MMOL/L (ref 5–15)
AST SERPL-CCNC: 115 U/L (ref 1–32)
BASOPHILS # BLD AUTO: 0.06 10*3/MM3 (ref 0–0.2)
BASOPHILS NFR BLD AUTO: 1 % (ref 0–1.5)
BILIRUB SERPL-MCNC: 27.7 MG/DL (ref 0–1.2)
BUN SERPL-MCNC: 13 MG/DL (ref 8–23)
BUN/CREAT SERPL: 18.6 (ref 7–25)
CALCIUM SPEC-SCNC: 8.6 MG/DL (ref 8.6–10.5)
CHLORIDE SERPL-SCNC: 103 MMOL/L (ref 98–107)
CO2 SERPL-SCNC: 21 MMOL/L (ref 22–29)
CREAT SERPL-MCNC: 0.7 MG/DL (ref 0.57–1)
DEPRECATED RDW RBC AUTO: 64.4 FL (ref 37–54)
EGFRCR SERPLBLD CKD-EPI 2021: 87.6 ML/MIN/1.73
EOSINOPHIL # BLD AUTO: 0.16 10*3/MM3 (ref 0–0.4)
EOSINOPHIL NFR BLD AUTO: 2.7 % (ref 0.3–6.2)
ERYTHROCYTE [DISTWIDTH] IN BLOOD BY AUTOMATED COUNT: 21.8 % (ref 12.3–15.4)
GLOBULIN UR ELPH-MCNC: 2.6 GM/DL
GLUCOSE SERPL-MCNC: 94 MG/DL (ref 65–99)
HCT VFR BLD AUTO: 32.4 % (ref 34–46.6)
HEMOCCULT STL QL: POSITIVE
HGB BLD-MCNC: 11.6 G/DL (ref 12–15.9)
IMM GRANULOCYTES # BLD AUTO: 0.02 10*3/MM3 (ref 0–0.05)
IMM GRANULOCYTES NFR BLD AUTO: 0.3 % (ref 0–0.5)
INR PPP: 1.27 (ref 0.9–1.1)
LYMPHOCYTES # BLD AUTO: 1.14 10*3/MM3 (ref 0.7–3.1)
LYMPHOCYTES NFR BLD AUTO: 19.4 % (ref 19.6–45.3)
MCH RBC QN AUTO: 29.7 PG (ref 26.6–33)
MCHC RBC AUTO-ENTMCNC: 35.8 G/DL (ref 31.5–35.7)
MCV RBC AUTO: 82.9 FL (ref 79–97)
MONOCYTES # BLD AUTO: 0.75 10*3/MM3 (ref 0.1–0.9)
MONOCYTES NFR BLD AUTO: 12.7 % (ref 5–12)
NEUTROPHILS NFR BLD AUTO: 3.76 10*3/MM3 (ref 1.7–7)
NEUTROPHILS NFR BLD AUTO: 63.9 % (ref 42.7–76)
NRBC BLD AUTO-RTO: 0 /100 WBC (ref 0–0.2)
PLATELET # BLD AUTO: 316 10*3/MM3 (ref 140–450)
PMV BLD AUTO: 9.6 FL (ref 6–12)
POTASSIUM SERPL-SCNC: 3.5 MMOL/L (ref 3.5–5.2)
POTASSIUM SERPL-SCNC: 4.2 MMOL/L (ref 3.5–5.2)
PROT SERPL-MCNC: 5.3 G/DL (ref 6–8.5)
PROTHROMBIN TIME: 16 SECONDS (ref 11.7–14.2)
RBC # BLD AUTO: 3.91 10*6/MM3 (ref 3.77–5.28)
SODIUM SERPL-SCNC: 136 MMOL/L (ref 136–145)
WBC NRBC COR # BLD: 5.89 10*3/MM3 (ref 3.4–10.8)

## 2023-08-29 PROCEDURE — 0FD98ZX EXTRACTION OF COMMON BILE DUCT, VIA NATURAL OR ARTIFICIAL OPENING ENDOSCOPIC, DIAGNOSTIC: ICD-10-PCS | Performed by: INTERNAL MEDICINE

## 2023-08-29 PROCEDURE — 82272 OCCULT BLD FECES 1-3 TESTS: CPT | Performed by: INTERNAL MEDICINE

## 2023-08-29 PROCEDURE — 80053 COMPREHEN METABOLIC PANEL: CPT | Performed by: INTERNAL MEDICINE

## 2023-08-29 PROCEDURE — 43274 ERCP DUCT STENT PLACEMENT: CPT | Performed by: INTERNAL MEDICINE

## 2023-08-29 PROCEDURE — 99222 1ST HOSP IP/OBS MODERATE 55: CPT | Performed by: INTERNAL MEDICINE

## 2023-08-29 PROCEDURE — C1874 STENT, COATED/COV W/DEL SYS: HCPCS | Performed by: INTERNAL MEDICINE

## 2023-08-29 PROCEDURE — 25010000002 DIPHENHYDRAMINE PER 50 MG: Performed by: NURSE PRACTITIONER

## 2023-08-29 PROCEDURE — 85025 COMPLETE CBC W/AUTO DIFF WBC: CPT | Performed by: INTERNAL MEDICINE

## 2023-08-29 PROCEDURE — 71260 CT THORAX DX C+: CPT

## 2023-08-29 PROCEDURE — 88112 CYTOPATH CELL ENHANCE TECH: CPT | Performed by: INTERNAL MEDICINE

## 2023-08-29 PROCEDURE — 97165 OT EVAL LOW COMPLEX 30 MIN: CPT

## 2023-08-29 PROCEDURE — 97161 PT EVAL LOW COMPLEX 20 MIN: CPT

## 2023-08-29 PROCEDURE — 84132 ASSAY OF SERUM POTASSIUM: CPT | Performed by: INTERNAL MEDICINE

## 2023-08-29 PROCEDURE — C1769 GUIDE WIRE: HCPCS | Performed by: INTERNAL MEDICINE

## 2023-08-29 PROCEDURE — 25510000001 IOPAMIDOL 61 % SOLUTION: Performed by: INTERNAL MEDICINE

## 2023-08-29 PROCEDURE — 25010000002 PHENYLEPHRINE 10 MG/ML SOLUTION: Performed by: ANESTHESIOLOGY

## 2023-08-29 PROCEDURE — 25010000002 PROPOFOL 10 MG/ML EMULSION: Performed by: ANESTHESIOLOGY

## 2023-08-29 PROCEDURE — BF101ZZ FLUOROSCOPY OF BILE DUCTS USING LOW OSMOLAR CONTRAST: ICD-10-PCS | Performed by: INTERNAL MEDICINE

## 2023-08-29 PROCEDURE — 74177 CT ABD & PELVIS W/CONTRAST: CPT

## 2023-08-29 PROCEDURE — 0F798DZ DILATION OF COMMON BILE DUCT WITH INTRALUMINAL DEVICE, VIA NATURAL OR ARTIFICIAL OPENING ENDOSCOPIC: ICD-10-PCS | Performed by: INTERNAL MEDICINE

## 2023-08-29 PROCEDURE — 74328 X-RAY BILE DUCT ENDOSCOPY: CPT

## 2023-08-29 PROCEDURE — 43273 ENDOSCOPIC PANCREATOSCOPY: CPT | Performed by: INTERNAL MEDICINE

## 2023-08-29 PROCEDURE — 85610 PROTHROMBIN TIME: CPT | Performed by: INTERNAL MEDICINE

## 2023-08-29 PROCEDURE — 88305 TISSUE EXAM BY PATHOLOGIST: CPT | Performed by: INTERNAL MEDICINE

## 2023-08-29 DEVICE — STENT SYSTEM RMV
Type: IMPLANTABLE DEVICE | Site: BILE DUCT | Status: FUNCTIONAL
Brand: WALLFLEX BILIARY

## 2023-08-29 RX ORDER — PHENYLEPHRINE HYDROCHLORIDE 10 MG/ML
INJECTION INTRAVENOUS AS NEEDED
Status: DISCONTINUED | OUTPATIENT
Start: 2023-08-29 | End: 2023-08-29 | Stop reason: SURG

## 2023-08-29 RX ORDER — LIDOCAINE HYDROCHLORIDE 20 MG/ML
INJECTION, SOLUTION INFILTRATION; PERINEURAL AS NEEDED
Status: DISCONTINUED | OUTPATIENT
Start: 2023-08-29 | End: 2023-08-29 | Stop reason: SURG

## 2023-08-29 RX ORDER — LACTULOSE 10 G/15ML
10 SOLUTION ORAL 2 TIMES DAILY
Status: DISCONTINUED | OUTPATIENT
Start: 2023-08-29 | End: 2023-08-31 | Stop reason: HOSPADM

## 2023-08-29 RX ORDER — PROPOFOL 10 MG/ML
VIAL (ML) INTRAVENOUS AS NEEDED
Status: DISCONTINUED | OUTPATIENT
Start: 2023-08-29 | End: 2023-08-29 | Stop reason: SURG

## 2023-08-29 RX ORDER — SODIUM CHLORIDE 9 MG/ML
INJECTION, SOLUTION INTRAVENOUS CONTINUOUS PRN
Status: DISCONTINUED | OUTPATIENT
Start: 2023-08-29 | End: 2023-08-29 | Stop reason: SURG

## 2023-08-29 RX ORDER — PROPOFOL 10 MG/ML
VIAL (ML) INTRAVENOUS CONTINUOUS PRN
Status: DISCONTINUED | OUTPATIENT
Start: 2023-08-29 | End: 2023-08-29 | Stop reason: SURG

## 2023-08-29 RX ORDER — POTASSIUM CHLORIDE 750 MG/1
40 TABLET, FILM COATED, EXTENDED RELEASE ORAL EVERY 4 HOURS
Status: COMPLETED | OUTPATIENT
Start: 2023-08-29 | End: 2023-08-29

## 2023-08-29 RX ORDER — DIPHENHYDRAMINE HYDROCHLORIDE 50 MG/ML
12.5 INJECTION INTRAMUSCULAR; INTRAVENOUS ONCE
Status: COMPLETED | OUTPATIENT
Start: 2023-08-29 | End: 2023-08-29

## 2023-08-29 RX ADMIN — LACTULOSE 10 G: 10 SOLUTION ORAL at 21:09

## 2023-08-29 RX ADMIN — Medication 200 MCG/KG/MIN: at 18:21

## 2023-08-29 RX ADMIN — PHENYLEPHRINE HYDROCHLORIDE 200 MCG: 10 INJECTION INTRAVENOUS at 18:48

## 2023-08-29 RX ADMIN — SODIUM CHLORIDE, POTASSIUM CHLORIDE, SODIUM LACTATE AND CALCIUM CHLORIDE 100 ML/HR: 600; 310; 30; 20 INJECTION, SOLUTION INTRAVENOUS at 22:48

## 2023-08-29 RX ADMIN — DIPHENHYDRAMINE HYDROCHLORIDE 12.5 MG: 50 INJECTION, SOLUTION INTRAMUSCULAR; INTRAVENOUS at 02:54

## 2023-08-29 RX ADMIN — LACTULOSE 10 G: 10 SOLUTION ORAL at 12:59

## 2023-08-29 RX ADMIN — IOPAMIDOL 85 ML: 612 INJECTION, SOLUTION INTRAVENOUS at 09:37

## 2023-08-29 RX ADMIN — PROPOFOL 120 MG: 10 INJECTION, EMULSION INTRAVENOUS at 18:21

## 2023-08-29 RX ADMIN — PHENYLEPHRINE HYDROCHLORIDE 100 MCG: 10 INJECTION INTRAVENOUS at 18:50

## 2023-08-29 RX ADMIN — PHENYLEPHRINE HYDROCHLORIDE 200 MCG: 10 INJECTION INTRAVENOUS at 18:42

## 2023-08-29 RX ADMIN — POTASSIUM CHLORIDE 40 MEQ: 750 TABLET, EXTENDED RELEASE ORAL at 05:59

## 2023-08-29 RX ADMIN — HYDROXYZINE HYDROCHLORIDE 25 MG: 25 TABLET ORAL at 05:52

## 2023-08-29 RX ADMIN — METOPROLOL SUCCINATE 25 MG: 25 TABLET, EXTENDED RELEASE ORAL at 08:36

## 2023-08-29 RX ADMIN — LIDOCAINE HYDROCHLORIDE 60 MG: 20 INJECTION, SOLUTION INFILTRATION; PERINEURAL at 18:21

## 2023-08-29 RX ADMIN — SODIUM CHLORIDE, POTASSIUM CHLORIDE, SODIUM LACTATE AND CALCIUM CHLORIDE 100 ML/HR: 600; 310; 30; 20 INJECTION, SOLUTION INTRAVENOUS at 08:37

## 2023-08-29 RX ADMIN — SODIUM CHLORIDE: 9 INJECTION, SOLUTION INTRAVENOUS at 18:14

## 2023-08-29 RX ADMIN — HYDROXYZINE HYDROCHLORIDE 25 MG: 25 TABLET ORAL at 15:46

## 2023-08-29 RX ADMIN — PHENYLEPHRINE HYDROCHLORIDE 200 MCG: 10 INJECTION INTRAVENOUS at 18:36

## 2023-08-29 RX ADMIN — Medication 10 ML: at 21:12

## 2023-08-29 RX ADMIN — Medication 10 ML: at 08:36

## 2023-08-29 RX ADMIN — POTASSIUM CHLORIDE 40 MEQ: 750 TABLET, EXTENDED RELEASE ORAL at 11:36

## 2023-08-29 RX ADMIN — ZOLPIDEM TARTRATE 2.5 MG: 5 TABLET ORAL at 21:09

## 2023-08-29 NOTE — PLAN OF CARE
Goal Outcome Evaluation:  Plan of Care Reviewed With: patient          Problem: Adult Inpatient Plan of Care  Goal: Plan of Care Review  Outcome: Ongoing, Progressing  Flowsheets (Taken 8/29/2023 8068)  Progress: no change  Plan of Care Reviewed With: patient  Outcome Evaluation: Vitals stable. Pt appears to be disoriented to situation and forgetful. Pt to have ERCP today. Pt SBA to bathroom. Pt needs met and questions met. Will continue to monitor.

## 2023-08-29 NOTE — ANESTHESIA POSTPROCEDURE EVALUATION
"Patient: Lizzie Bose    Procedure Summary       Date: 08/29/23 Room / Location: [facility] ENDOSCOPY 4 / [facility] ENDOSCOPY    Anesthesia Start: 1814 Anesthesia Stop: 1850    Procedure: ENDOSCOPIC RETROGRADE CHOLANGIOPANCREATOGRAPHY WITH SPHINCTEROTOMY; BRUSHING AND STENT PLACEMENT Diagnosis:       Elevated liver enzymes      Bile duct obstruction      (Elevated liver enzymes [R74.8])    Surgeons: Catarino Mg MD Provider: Jeffy Neff MD    Anesthesia Type: MAC ASA Status: 3            Anesthesia Type: MAC    Vitals  Vitals Value Taken Time   BP 88/69 08/29/23 1850   Temp     Pulse 92 08/29/23 1850   Resp 16 08/29/23 1850   SpO2 97 % 08/29/23 1850           Post Anesthesia Care and Evaluation    Patient location during evaluation: bedside  Pain management: adequate    Airway patency: patent  Anesthetic complications: No anesthetic complications    Cardiovascular status: acceptable  Respiratory status: acceptable  Hydration status: acceptable    Comments: */71 (BP Location: Right arm, Patient Position: Lying)   Pulse 87   Temp 37.1 øC (98.7 øF) (Oral)   Resp 17   Ht 165.1 cm (65\")   Wt 59 kg (130 lb)   SpO2 96%   BMI 21.63 kg/mý       "

## 2023-08-29 NOTE — NURSING NOTE
Paged Nimisha Kumar, APRN to report pt says she has had black stools today.  Asked if occult stool sample is indicated and received orders accordingly.  Order input.

## 2023-08-29 NOTE — THERAPY EVALUATION
Patient Name: Lizzie Bose  : 1945    MRN: [id number]                              Today's Date: 2023       Admit Date: 2023    Visit Dx:     ICD-10-CM ICD-9-CM   1. Jaundice  R17 782.4   2. Biliary obstruction  K83.1 576.2   3. Adnexal cyst  N94.9 625.8   4. Tachycardia  R00.0 785.0   5. Elevated liver enzymes  R74.8 790.5     Patient Active Problem List   Diagnosis    PAT (obstructive sleep apnea)    Lumbar pain    Primary osteoarthritis of left knee    Primary osteoarthritis of right knee    Benign essential hypertension    Mixed hyperlipidemia    Vitamin D deficiency    Age-related osteoporosis without current pathological fracture    Hyperinsulinism    Chronic insomnia    Attention deficit hyperactivity disorder (ADHD), combined type    Dyspnea on exertion    Dyslipidemia    Biliary obstruction    Elevated liver enzymes     Past Medical History:   Diagnosis Date    ADD (attention deficit disorder)     Arthritis     Depression     High blood pressure     History of Clostridioides difficile infection     Hyperlipidemia     Low back pain     Right knee pain     Sleep apnea     NO USING MACHINE     Past Surgical History:   Procedure Laterality Date    APPENDECTOMY      CARPAL TUNNEL RELEASE Right     CATARACT EXTRACTION Bilateral     HAMMER TOE REPAIR Left     HIP ARTHROPLASTY Left     TOTAL KNEE ARTHROPLASTY Right 2020    Procedure: TOTAL KNEE ARTHROPLASTY;  Surgeon: Krish Ramirez MD;  Location: [facility];  Service: Orthopedics;  Laterality: Right;      General Information       Row Name 23 0931          Physical Therapy Time and Intention    Document Type discharge evaluation/summary;evaluation  -SM     Mode of Treatment physical therapy  -       Row Name 23 0931          General Information    Patient Profile Reviewed yes  -SM     Prior Level of Function independent:  -       Row Name 23 0931          Living Environment    People in Home alone  -       Row  Name 08/29/23 0931          Home Main Entrance    Number of Stairs, Main Entrance three  -SM       Row Name 08/29/23 0931          Cognition    Orientation Status (Cognition) oriented x 4  -SM               User Key  (r) = Recorded By, (t) = Taken By, (c) = Cosigned By      Initials Name Provider Type    Aubree Burkett PT Physical Therapist                   Mobility       Row Name 08/29/23 0932          Bed Mobility    Bed Mobility supine-sit;sit-supine  -SM     Supine-Sit [place] (Bed Mobility) modified independence  -SM     Sit-Supine [place] (Bed Mobility) modified independence  -SM       Row Name 08/29/23 0932          Sit-Stand Transfer    Sit-Stand [place] (Transfers) supervision  -       Row Name 08/29/23 0932          Gait/Stairs (Locomotion)    [place] Level (Gait) supervision  -     Distance in Feet (Gait) 30ft  -     Comment, (Gait/Stairs) Gait steady with no LOB noted. Distance limited by patient leaving with transport for testing.  -               User Key  (r) = Recorded By, (t) = Taken By, (c) = Cosigned By      Initials Name Provider Type     Aubree Chritsianson PT Physical Therapist                   Obj/Interventions       Row Name 08/29/23 0933          Range of Motion Comprehensive    General Range of Motion bilateral lower extremity ROM WFL  -       Row Name 08/29/23 0933          Strength Comprehensive (MMT)    Comment, General Manual Muscle Testing (MMT) Assessment B LEs WFL  -       Row Name 08/29/23 0933          Balance    Balance Assessment sitting static balance;sitting dynamic balance;sit to stand dynamic balance;standing static balance;standing dynamic balance  -     Static Sitting Balance independent  -SM     Dynamic Sitting Balance independent  -SM     Position, Sitting Balance sitting edge of bed  -     Sit to Stand Dynamic Balance supervision  -     Static Standing Balance supervision  -     Dynamic Standing Balance supervision  -      Position/Device Used, Standing Balance unsupported  -SM     Balance Interventions sitting;standing;sit to stand;static;dynamic  -SM               User Key  (r) = Recorded By, (t) = Taken By, (c) = Cosigned By      Initials Name Provider Type     Aubree Christianson, PT Physical Therapist                   Goals/Plan    No documentation.                  Clinical Impression       Row Name 08/29/23 0933          Pain    Pretreatment Pain Rating 0/10 - no pain  -SM     Posttreatment Pain Rating 0/10 - no pain  -SM       Row Name 08/29/23 0933          Plan of Care Review    Plan of Care Reviewed With patient  -SM     Outcome Evaluation Patient is an 78 y.o female who [facility] to [facility] with full body itching. Patient found to have a biliary obstruction. Patient AOx4 supine in bed upon arrival. Patient lives alone. Patient reports she is independent at baseline and does not use an AD. Today patient completed all bed mobility mod(I). Patient ambulated with no AD and SV. Gait steady with no LOB noted. Distance limited by patient leaving with transport for testing. Patient appears to be mobilizing at baseline mobility. No further skilled acute PT needs identified. Recommend patient continue ambulating in hammer multiple times per day. PT will s/o.  -SM       Row Name 08/29/23 0933          Therapy Assessment/Plan (PT)    Criteria for Skilled Interventions Met (PT) no problems identified which require skilled intervention;no  -SM     Therapy Frequency (PT) evaluation only  -SM       Row Name 08/29/23 0933          Vital Signs    Pre Patient Position Supine  -SM     Intra Patient Position Standing  -SM     Post Patient Position Supine  -SM       Row Name 08/29/23 0933          Positioning and Restraints    Pre-Treatment Position in bed  -SM     Post Treatment Position other  -SM     Other Position with other staff  with transport  -SM               User Key  (r) = Recorded By, (t) = Taken By, (c) = Cosigned By      Initials  Name Provider Type     Aubree Christianson PT Physical Therapist                   Outcome Measures       Row Name 08/29/23 0936          How much help from another person do you currently need...    Turning from your back to your side while in flat bed without using bedrails? 4  -SM     Moving from lying on back to sitting on the side of a flat bed without bedrails? 4  -SM     Moving to and from a bed to a chair (including a wheelchair)? 4  -SM     Standing up from a chair using your arms (e.g., wheelchair, bedside chair)? 4  -SM     Climbing 3-5 steps with a railing? 4  -SM     To walk in hospital room? 4  -SM     AM-PAC 6 Clicks Score (PT) 24  -SM     Highest level of mobility 8 --> Walked 250 feet or more  -               User Key  (r) = Recorded By, (t) = Taken By, (c) = Cosigned By      Initials Name Provider Type     Aubree Christianson PT Physical Therapist                                 Physical Therapy Education       Title: PT OT SLP Therapies (Done)       Topic: Physical Therapy (Done)       Point: Mobility training (Done)       Learning Progress Summary             Patient Acceptance, E, VU by  at 8/29/2023 0937                         Point: Home exercise program (Done)       Learning Progress Summary             Patient Acceptance, E, VU by  at 8/29/2023 0937                         Point: Body mechanics (Done)       Learning Progress Summary             Patient Acceptance, E, VU by  at 8/29/2023 0937                         Point: Precautions (Done)       Learning Progress Summary             Patient Acceptance, E, VU by  at 8/29/2023 0937                                         User Key       Initials Effective Dates Name Provider Type [facility] 05/02/22 -  Aubree Christianson PT Physical Therapist PT                  PT Recommendation and Plan     Plan of Care Reviewed With: patient  Outcome Evaluation: Patient is an 78 y.o female who presented to [facility] with full body itching.  Patient found to have a biliary obstruction. Patient AOx4 supine in bed upon arrival. Patient lives alone. Patient reports she is independent at baseline and does not use an AD. Today patient completed all bed mobility mod(I). Patient ambulated with no AD and SV. Gait steady with no LOB noted. Distance limited by patient leaving with transport for testing. Patient appears to be mobilizing at baseline mobility. No further skilled acute PT needs identified. Recommend patient continue ambulating in hammer multiple times per day. PT will s/o.     Time Calculation:         PT Charges       Row Name 08/29/23 0937             Time Calculation    Start Time 0920  -      Stop Time 0928  -      Time Calculation (min) 8 min  -SM      PT Received On 08/29/23  -                User Key  (r) = Recorded By, (t) = Taken By, (c) = Cosigned By      Initials Name Provider Type     Aubree Christianson, CHRISS Physical Therapist                  Therapy Charges for Today       Code Description Service Date Service Provider Modifiers Qty    38755283590 HC PT EVAL LOW COMPLEXITY 3 8/29/2023 Aubree Christianson, PT GP 1            PT G-Codes  AM-PAC 6 Clicks Score (PT): 24  PT Discharge Summary  Anticipated Discharge Disposition (PT): home    Aubree Christianson PT  8/29/2023

## 2023-08-29 NOTE — ANESTHESIA PREPROCEDURE EVALUATION
Anesthesia Evaluation     Patient summary reviewed and Nursing notes reviewed   NPO Solid Status: > 8 hours  NPO Liquid Status: > 2 hours           Airway   Mallampati: II  TM distance: >3 FB  Neck ROM: full  Dental - normal exam   (+) implants    Pulmonary - normal exam    breath sounds clear to auscultation  (+) ,sleep apnea  Cardiovascular     ECG reviewed  Patient on routine beta blocker  Rhythm: irregular  Rate: normal    (+) hypertension 2 medications or greater, dysrhythmias PVC, DAUGHERTY, murmur, hyperlipidemia  (-) angina, orthopnea, PND      Neuro/Psych  (+) psychiatric history ADD and Depression  GI/Hepatic/Renal/Endo    (+) liver disease history of elevated LFT    Musculoskeletal     Abdominal    Substance History - negative use     OB/GYN negative ob/gyn ROS         Other   arthritis,                     Anesthesia Plan    ASA 3     MAC   total IV anesthesia    Anesthetic plan, risks, benefits, and alternatives have been provided, discussed and informed consent has been obtained with: patient.

## 2023-08-29 NOTE — THERAPY EVALUATION
Patient Name: Lizzie Bose  : 1945    MRN: [id number]                              Today's Date: 2023       Admit Date: 2023    Visit Dx:     ICD-10-CM ICD-9-CM   1. Jaundice  R17 782.4   2. Biliary obstruction  K83.1 576.2   3. Adnexal cyst  N94.9 625.8   4. Tachycardia  R00.0 785.0   5. Elevated liver enzymes  R74.8 790.5     Patient Active Problem List   Diagnosis    PAT (obstructive sleep apnea)    Lumbar pain    Primary osteoarthritis of left knee    Primary osteoarthritis of right knee    Benign essential hypertension    Mixed hyperlipidemia    Vitamin D deficiency    Age-related osteoporosis without current pathological fracture    Hyperinsulinism    Chronic insomnia    Attention deficit hyperactivity disorder (ADHD), combined type    Dyspnea on exertion    Dyslipidemia    Biliary obstruction    Elevated liver enzymes     Past Medical History:   Diagnosis Date    ADD (attention deficit disorder)     Arthritis     Depression     High blood pressure     History of Clostridioides difficile infection     Hyperlipidemia     Low back pain     Right knee pain     Sleep apnea     NO USING MACHINE     Past Surgical History:   Procedure Laterality Date    APPENDECTOMY      CARPAL TUNNEL RELEASE Right     CATARACT EXTRACTION Bilateral     HAMMER TOE REPAIR Left     HIP ARTHROPLASTY Left     TOTAL KNEE ARTHROPLASTY Right 2020    Procedure: TOTAL KNEE ARTHROPLASTY;  Surgeon: Krish Ramirez MD;  Location: [facility];  Service: Orthopedics;  Laterality: Right;      General Information       Row Name 23 0939          OT Time and Intention    Document Type discharge evaluation/summary;evaluation  -JW     Mode of Treatment occupational therapy  -JW       Row Name 23 0939          General Information    Patient Profile Reviewed yes  -JW     Prior Level of Function independent:;ADL's;all household mobility  w/o AD  -JW     Existing Precautions/Restrictions no known precautions/restrictions   -     Barriers to Rehab none identified  -       Row Name 08/29/23 0939          Living Environment    People in Home alone  -       Row Name 08/29/23 0939          Cognition    Orientation Status (Cognition) oriented x 4  -               User Key  (r) = Recorded By, (t) = Taken By, (c) = Cosigned By      Initials Name Provider Type    Meredith Contreras OT Occupational Therapist                     Mobility/ADL's       Row Name 08/29/23 0940          Bed Mobility    Bed Mobility supine-sit;sit-supine  -     Supine-Sit [place] (Bed Mobility) modified independence  -     Sit-Supine [place] (Bed Mobility) modified independence  -     Assistive Device (Bed Mobility) bed rails  -       Row Name 08/29/23 0940          Sit-Stand Transfer    Sit-Stand [place] (Transfers) supervision  -[facility] Name 08/29/23 0940          Functional Mobility    Functional Mobility- Ind. Level supervision required  -     Functional Mobility- Comment spv around room and to stretcher in hallway w/o AD. no LOB  -[facility] Name 08/29/23 0940          Activities of Daily Living    BADL Assessment/Intervention lower body dressing;toileting  -[facility] Name 08/29/23 0940          Lower Body Dressing Assessment/Training    [place] Level (Lower Body Dressing) don;socks;independent  -     Position (Lower Body Dressing) edge of bed sitting  -[facility] Name 08/29/23 0940          Toileting Assessment/Training    [place] Level (Toileting) toileting skills;independent  -     Assistive Devices (Toileting) commode  -               User Key  (r) = Recorded By, (t) = Taken By, (c) = Cosigned By      Initials Name Provider Type    Meredith Contreras OT Occupational Therapist                   Obj/Interventions       Row Name 08/29/23 0941          Range of Motion Comprehensive    General Range of Motion bilateral upper extremity ROM WNL  -       Row Name 08/29/23 0941          Strength Comprehensive  (MMT)    Comment, General Manual Muscle Testing (MMT) Assessment WFL  -       Row Name 08/29/23 0941          Balance    Static Sitting Balance independent  -     Dynamic Sitting Balance independent  -     Static Standing Balance supervision  -     Dynamic Standing Balance supervision  -     Balance Interventions occupation based/functional task  -               User Key  (r) = Recorded By, (t) = Taken By, (c) = Cosigned By      Initials Name Provider Type     Meredith Simon OT Occupational Therapist                   Goals/Plan       Row Name 08/29/23 0945          Dressing Goal 1 (OT)    Activity/Device (Dressing Goal 1, OT) lower body dressing  -     [place]/Cues Needed (Dressing Goal 1, OT) independent  -     Time Frame (Dressing Goal 1, OT) short term goal (STG);2 weeks  -     Progress/Outcome (Dressing Goal 1, OT) goal met  -               User Key  (r) = Recorded By, (t) = Taken By, (c) = Cosigned By      Initials Name Provider Type     Meredith Simon OT Occupational Therapist                   Clinical Impression       Row Name 08/29/23 0941          Pain Assessment    Pretreatment Pain Rating 0/10 - no pain  -     Posttreatment Pain Rating 0/10 - no pain  -       Row Name 08/29/23 0941          Plan of Care Review    Plan of Care Reviewed With patient  -     Outcome Evaluation PT is an 77 y/o F admitted with biliary obstruction, jaundice, and tachycardia. She lives alone and is indep with ADLs and fxl mobility w/o AD. Pt seen today for OT eval, presents back to baseline fxn. Performs bed mobility, LB dressing and toileting independently. Spv for fxl ambulation around room, bathroom, and into hallway to stretcher, no AD and no LOB. No acute OT needs identified at this time. Will sign off and rec home with family assist.  -       Row Name 08/29/23 0941          Therapy Assessment/Plan (OT)    Criteria for Skilled Therapeutic Interventions Met (OT) no problems identified which  require skilled intervention  -     Therapy Frequency (OT) evaluation only  -       Row Name 08/29/23 0941          Therapy Plan Review/Discharge Plan (OT)    Anticipated Discharge Disposition (OT) home with assist  -       Row Name 08/29/23 0941          Positioning and Restraints    Pre-Treatment Position in bed  -     Post Treatment Position other  -JW     Other Position with other staff  transport  -               User Key  (r) = Recorded By, (t) = Taken By, (c) = Cosigned By      Initials Name Provider Type    Meredith Contreras, OSMAR Occupational Therapist                   Outcome Measures       Row Name 08/29/23 0945          How much help from another is currently needed...    Putting on and taking off regular lower body clothing? 4  -JW     Bathing (including washing, rinsing, and drying) 4  -JW     Toileting (which includes using toilet bed pan or urinal) 4  -JW     Putting on and taking off regular upper body clothing 4  -JW     Taking care of personal grooming (such as brushing teeth) 4  -JW     Eating meals 4  -JW     AM-PAC 6 Clicks Score (OT) 24  -       Row Name 08/29/23 0936          How much help from another person do you currently need...    Turning from your back to your side while in flat bed without using bedrails? 4  -SM     Moving from lying on back to sitting on the side of a flat bed without bedrails? 4  -SM     Moving to and from a bed to a chair (including a wheelchair)? 4  -SM     Standing up from a chair using your arms (e.g., wheelchair, bedside chair)? 4  -SM     Climbing 3-5 steps with a railing? 4  -SM     To walk in hospital room? 4  -SM     AM-PAC 6 Clicks Score (PT) 24  -SM     Highest level of mobility 8 --> Walked 250 feet or more  -       Row Name 08/29/23 0945          Functional Assessment    Outcome Measure Options AM-PAC 6 Clicks Daily Activity (OT)  -               User Key  (r) = Recorded By, (t) = Taken By, (c) = Cosigned By      Initials Name Provider Type     Meredith Contreras, OT Occupational Therapist     Aubree Christianson PT Physical Therapist                    Occupational Therapy Education       Title: PT OT SLP Therapies (In Progress)       Topic: Occupational Therapy (In Progress)       Point: ADL training (Done)       Description:   Instruct learner(s) on proper safety adaptation and remediation techniques during self care or transfers.   Instruct in proper use of assistive devices.                  Learning Progress Summary             Patient Acceptance, E, VU by  at 8/29/2023 0945    Comment: home safety, plan of care                         Point: Home exercise program (Not Started)       Description:   Instruct learner(s) on appropriate technique for monitoring, assisting and/or progressing therapeutic exercises/activities.                  Learner Progress:  Not documented in this visit.              Point: Precautions (Done)       Description:   Instruct learner(s) on prescribed precautions during self-care and functional transfers.                  Learning Progress Summary             Patient Acceptance, E, VU by  at 8/29/2023 0945    Comment: home safety, plan of care                         Point: Body mechanics (Done)       Description:   Instruct learner(s) on proper positioning and spine alignment during self-care, functional mobility activities and/or exercises.                  Learning Progress Summary             Patient Acceptance, E, VU by  at 8/29/2023 0945    Comment: home safety, plan of care                                         User Key       Initials Effective Dates Name Provider Type Discipline     06/10/21 -  Meredith Simon OT Occupational Therapist OT                  OT Recommendation and Plan  Therapy Frequency (OT): evaluation only  Plan of Care Review  Plan of Care Reviewed With: patient  Outcome Evaluation: PT is an 79 y/o F admitted with biliary obstruction, jaundice, and tachycardia. She lives alone and is indep  with ADLs and fxl mobility w/o AD. Pt seen today for OT eval, presents back to baseline fxn. Performs bed mobility, LB dressing and toileting independently. Spv for fxl ambulation around room, bathroom, and into hallway to stretcher, no AD and no LOB. No acute OT needs identified at this time. Will sign off and rec home with family assist.     Time Calculation:   Evaluation Complexity (OT)  Review Occupational Profile/Medical/Therapy History Complexity: brief/low complexity  Assessment, Occupational Performance/Identification of Deficit Complexity: 1-3 performance deficits  Clinical Decision Making Complexity (OT): problem focused assessment/low complexity  Overall Complexity of Evaluation (OT): low complexity     Time Calculation- OT       Row Name 08/29/23 0946             Time Calculation- OT    OT Start Time 0920  -      OT Stop Time 0928  -      OT Time Calculation (min) 8 min  -JW      OT Received On 08/29/23  -JW         Untimed Charges    OT Eval/Re-eval Minutes 8  -JW         Total Minutes    Untimed Charges Total Minutes 8  -JW       Total Minutes 8  -JW                User Key  (r) = Recorded By, (t) = Taken By, (c) = Cosigned By      Initials Name Provider Type    Meredith Contreras OT Occupational Therapist                  Therapy Charges for Today       Code Description Service Date Service Provider Modifiers Qty    [id number]  OT EVAL LOW COMPLEXITY 3 8/29/2023 Meredith Simon OT GO 1                 Meredith Simon OT  8/29/2023

## 2023-08-29 NOTE — PLAN OF CARE
Goal Outcome Evaluation:  Plan of Care Reviewed With: patient           Outcome Evaluation: Patient is an 78 y.o female who presented to [facility] with full body itching. Patient found to have a biliary obstruction. Patient AOx4 supine in bed upon arrival. Patient lives alone. Patient reports she is independent at baseline and does not use an AD. Today patient completed all bed mobility mod(I). Patient ambulated with no AD and SV. Gait steady with no LOB noted. Distance limited by patient leaving with transport for testing. Patient appears to be mobilizing at baseline mobility. No further skilled acute PT needs identified. Recommend patient continue ambulating in hammer multiple times per day. PT will s/o.      Anticipated Discharge Disposition (PT): home

## 2023-08-29 NOTE — NURSING NOTE
Paged MORIS Chatman to report pt requesting additional medication for itching.  She said the atarax helped for a little while but has since worn off and she is seen visibly scratching her arms, scalp and chest where she says the bug bites are from when she had been out working in her garden.  Next PRN atarax dose not due for another 2 hours approx.  Pt requesting Benadryl.    MORIS Crawley will input orders.

## 2023-08-29 NOTE — NURSING NOTE
Pt reports black loose stools today but already flushed them.  Asked her to save next stool and added a potty hat in back to capture so I can observe.

## 2023-08-29 NOTE — PROGRESS NOTES
Name: Lizzie Bose ADMIT: 2023   : 1945  PCP: Ct Alford APRN    MRN: [id number] LOS: 2 days   AGE/SEX: 78 y.o. female  ROOM: [facility]     Subjective   Subjective   Chief Complaint   Patient presents with    Hypertension    Rapid Heart Rate      Not reporting any abdominal pain currently.  No nausea vomiting or diarrhea.  No fevers or chills. Noted plans for ERCP today     Objective   Objective   Vital Signs  Temp:  [97.7 °F (36.5 °C)-98.8 °F (37.1 °C)] 98.8 °F (37.1 °C)  Heart Rate:  [67-78] 67  Resp:  [16-18] 17  BP: (125-142)/(60-86) 138/60  SpO2:  [97 %-100 %] 99 %  on   ;   Device (Oxygen Therapy): room air  Body mass index is 21.63 kg/m².    Physical Exam  Vitals and nursing note reviewed.   Constitutional:       General: She is not in acute distress.     Appearance: She is ill-appearing. She is not diaphoretic.   HENT:      Head: Atraumatic.   Eyes:      General: Scleral icterus present.      Pupils: Pupils are equal, round, and reactive to light.   Pulmonary:      Effort: Pulmonary effort is normal.      Breath sounds: No wheezing.   Abdominal:      General: There is no distension.      Palpations: Abdomen is soft.      Tenderness: There is no abdominal tenderness. There is no guarding or rebound.   Musculoskeletal:         General: No swelling or tenderness.   Skin:     General: Skin is dry.      Coloration: Skin is jaundiced.   Neurological:      Mental Status: She is alert.      Cranial Nerves: No cranial nerve deficit.   Psychiatric:         Mood and Affect: Mood normal.         Behavior: Behavior normal.     Results Review  I reviewed the patient's new clinical results.    Results from last 7 days   Lab Units 23  0448 23  0458 23  0540   WBC 10*3/mm3 5.89 6.04 4.94   HEMOGLOBIN g/dL 11.6* 11.4* 13.2   PLATELETS 10*3/mm3 316 301 330       Results from last 7 days   Lab Units 23  0448 23  0458 23  0028 23  0540 23  2156   SODIUM mmol/L  136 134*  --  138 133*   POTASSIUM mmol/L 3.5 4.2 4.9 2.8* 3.5   CHLORIDE mmol/L 103 104  --  103 97*   CO2 mmol/L 21.0* 18.2*  --  19.0* 17.0*   BUN mg/dL 13 18  --  12 14   CREATININE mg/dL 0.70 0.78  --  0.89 0.93   GLUCOSE mg/dL 94 116*  --  118* 131*   EGFR mL/min/1.73 87.6 76.9  --  65.6 62.3       Results from last 7 days   Lab Units 08/29/23 0448 08/28/23 0458 08/26/23  2156   ALBUMIN g/dL 2.7* 2.9* 3.7   BILIRUBIN mg/dL 27.7* 28.8* 33.2*   ALK PHOS U/L 201* 196* 284*   AST (SGOT) U/L 115* 130* 163*   ALT (SGPT) U/L 101* 112* 144*       Results from last 7 days   Lab Units 08/29/23 0448 08/28/23 0458 08/27/23  0540 08/26/23  2156   CALCIUM mg/dL 8.6 8.4* 8.9 9.2   ALBUMIN g/dL 2.7* 2.9*  --  3.7   MAGNESIUM mg/dL  --   --  3.4*  --            No results found for: HGBA1C, POCGLU    CT Head Without Contrast    Result Date: 8/27/2023  Electronically signed by Andrea Lozano MD on 08-27-23 at 1809     I have personally reviewed all medications:  Scheduled Medications  metoprolol succinate XL, 25 mg, Oral, Q24H  potassium chloride ER, 40 mEq, Oral, Q4H  senna-docusate sodium, 2 tablet, Oral, BID  sodium chloride, 10 mL, Intravenous, Q12H      Infusions  lactated ringers, 100 mL/hr, Last Rate: 100 mL/hr (08/29/23 0837)      Diet  NPO Diet NPO Type: Strict NPO    I have personally reviewed:  [x]  Laboratory   []  Microbiology   [x]  Radiology   [x]  EKG/Telemetry  []  Cardiology/Vascular   []  Pathology    []  Records       Assessment/Plan     Active Hospital Problems    Diagnosis  POA    **Biliary obstruction [K83.1]  Yes    Elevated liver enzymes [R74.8]  Unknown    Dyslipidemia [E78.5]  Yes    Chronic insomnia [F51.04]  Yes    Benign essential hypertension [I10]  Yes    PAT (obstructive sleep apnea) [G47.33]  Yes      Resolved Hospital Problems   No resolved problems to display.       78 y.o. female admitted with Biliary obstruction.    Biliary obstruction/painless jaundice: Pancreatic mass noted on MRCP.   CA 19-9 is very elevated.  She also has some distention of gallbladder but no current evidence of cholangitis with no fever right upper quadrant pain or leukocytosis.  Proceed with needed work-up, ERCP.  GI following. With high liklihood as cancer will go ahead and have Oncology see. Likely will follow up as outpatient with Oncology and GI for BX results and further plans.         Adnexal cyst: Pelvic ultrasound consideration as outpatient after abdominal work-up is done.  Anxiety: Atarax as needed.   Hypertension: She had stopped taking her home medicines prior to admission.  Prior beta-blocker was resumed at a lower dose and blood pressure is acceptable acutely.  Ppx: SCD  Disposition: likely to home on 8/30    Expected Discharge Date: 8/31/2023; Expected Discharge Time:      Van Palmer MD  [facility]  08/29/23  10:04 EDT    Dictated portions of note using dragon dictation software.  Copied text in this note has been reviewed by me and remains accurate as of 08/29/23

## 2023-08-29 NOTE — BRIEF OP NOTE
ENDOSCOPIC RETROGRADE CHOLANGIOPANCREATOGRAPHY WITH STENT PLACEMENT  Progress Note    Lizzie Bose  8/29/2023    Pre-op Diagnosis:   Elevated liver enzymes [R74.8]       Post-Op Diagnosis Codes:     * Elevated liver enzymes [R74.8]     * Bile duct obstruction [K83.1]    Procedure/CPT® Codes:        Procedure(s):  ENDOSCOPIC RETROGRADE CHOLANGIOPANCREATOGRAPHY WITH SPHINCTEROTOMY; BRUSHING AND STENT PLACEMENT              Surgeon(s):  Catarino Mg MD    Anesthesia: Monitored Anesthesia Care    Staff:   Radiology Technologist: Arpan Billings Nurse: Renuka Fortune RN; Ashia Carroll RN         Estimated Blood Loss: minimal    Urine Voided: * No values recorded between 8/29/2023  6:14 PM and 8/29/2023  6:39 PM *    Specimens:                Specimens       ID Source Type Tests Collected By Collected At Frozen?    A Common Bile Duct Brushing NON-GYNECOLOGIC CYTOLOGY   Catarino Mg MD 8/29/23 1835     Description: COMMON BILE DUCT BRUSHING R/O CANCER    This specimen was not marked as sent.                  Drains: * No LDAs found *    Findings: ERCP with sphincterotomy brush cytology and stent placement performed revealing Schatzki's ring in the distal esophagus large hiatal hernia with normal-appearing ampulla.  Cannulation achieved revealing high-grade obstruction in the distal third of the common bile duct.  Dilated common bile duct above the obstruction with right and left hepatic's dilated as well.  Sphincterotomy and brush cytology were performed followed by placement of 10 mm x 60 mm covered Wallstent.  Patient tolerated well sent to recovery.        Complications: None          Catarino Mg MD     Date: 8/29/2023  Time: 18:43 EDT

## 2023-08-29 NOTE — PLAN OF CARE
Goal Outcome Evaluation:  Plan of Care Reviewed With: patient           Outcome Evaluation: PT is an 77 y/o F admitted with biliary obstruction, jaundice, and tachycardia. She lives alone and is indep with ADLs and fxl mobility w/o AD. Pt seen today for OT eval, presents back to baseline fxn. Performs bed mobility, LB dressing and toileting independently. Spv for fxl ambulation around room, bathroom, and into hallway to stretcher, no AD and no LOB. No acute OT needs identified at this time. Will sign off and rec home with family assist.

## 2023-08-29 NOTE — CONSULTS
[facility] GROUP INITIAL INPATIENT CONSULTATION NOTE    REASON FOR CONSULTATION: Obstructive jaundice, pancreas mass, suspicion for malignancy    HISTORY OF PRESENT ILLNESS:  Lizzie Bose is a 78 y.o. female who we are asked to see today in consultation for obstructive jaundice and pancreas mass suspicious for malignancy    The patient has a past medical history of hypertension, sleep apnea, hyperlipidemia, insomnia, ADHD.  She presented with jaundice and confusion.    On 8/26/2023 the bilirubin was 33.2 with an AST of 163 and .  INR 1.72.  CA 19-9 2836.  CT imaging of the abdomen and pelvis on 8/26/2023 showed biliary obstruction with severe intra and extrahepatic ductal dilation.  The common bile duct became abruptly decompressed as it projected through the pancreas head.  Distended gallbladder.  A few nodular areas of pulmonary opacification of both lung bases were nonspecific.  A left adnexal cystic lesion measures 5.7 cm, incompletely evaluated.  MRI of the abdomen on 8/27/2023 showed a hypoenhancing pancreas mass centered within the uncinate process and pancreas head with obstruction of the common bile duct and severe upstream intra and extrahepatic biliary ductal dilation.  Gallbladder with a thickened hyperenhancing appearance suggestive of cholecystitis.  Pancreas divisum noted.  A cystic lesion within the left aspect of the pelvis was partially visualized.    Past Medical History:   Diagnosis Date    ADD (attention deficit disorder)     Arthritis     Depression     High blood pressure     History of Clostridioides difficile infection     Hyperlipidemia     Low back pain     Right knee pain     Sleep apnea     NO USING MACHINE       Past Surgical History:   Procedure Laterality Date    APPENDECTOMY      CARPAL TUNNEL RELEASE Right     CATARACT EXTRACTION Bilateral     HAMMER TOE REPAIR Left     HIP ARTHROPLASTY Left     TOTAL KNEE ARTHROPLASTY Right 7/13/2020    Procedure: TOTAL KNEE  ARTHROPLASTY;  Surgeon: Krish Ramirez MD;  Location: [facility];  Service: Orthopedics;  Laterality: Right;       SOCIAL HISTORY:   reports that she has never smoked. She has never used smokeless tobacco. She reports that she does not drink alcohol and does not use drugs.    FAMILY HISTORY:  family history includes Clotting disorder in her son; Diabetes in her brother; Hypertension in her brother.    ALLERGIES:  Allergies   Allergen Reactions    Atorvastatin        MEDICATIONS:  As listed in the electronic medical record.    Review of Systems   Unable to perform ROS: Other   Patient sleeping    Vitals:    08/28/23 1328 08/28/23 2005 08/28/23 2245 08/29/23 0625   BP: 142/80 125/86 141/74 138/60   BP Location: Left arm Left arm Left arm Left arm   Patient Position: Lying Lying Lying Lying   Pulse: 70 70 78 67   Resp: 16 18 17 17   Temp: 97.7 °F (36.5 °C) 98.7 °F (37.1 °C) 97.9 °F (36.6 °C) 98.8 °F (37.1 °C)   TempSrc: Oral Oral Oral Oral   SpO2: 100% 98% 97% 99%   Weight:       Height:           General:  Sleeping comfortably  Skin:  Jaundiced  HEENT:  Normocephalic/atraumatic.   Chest:  Normal respiratory effort  Extremities:  No visible clubbing, cyanosis, or edema       DIAGNOSTIC DATA:  Results from last 7 days   Lab Units 08/29/23  0448   WBC 10*3/mm3 5.89   HEMOGLOBIN g/dL 11.6*   HEMATOCRIT % 32.4*   PLATELETS 10*3/mm3 316     Lab Results   Component Value Date    NEUTROABS 3.76 08/29/2023     Results from last 7 days   Lab Units 08/29/23  0448   SODIUM mmol/L 136   POTASSIUM mmol/L 3.5   CHLORIDE mmol/L 103   CO2 mmol/L 21.0*   BUN mg/dL 13   CREATININE mg/dL 0.70   GLUCOSE mg/dL 94   CALCIUM mg/dL 8.6     Results from last 7 days   Lab Units 08/29/23  0814 08/28/23  0458 08/27/23  0540 08/26/23  2156   INR  1.27* 2.73* 2.01* 1.72*   APTT seconds  --   --   --  32.3     Results from last 7 days   Lab Units 08/27/23  0540   MAGNESIUM mg/dL 3.4*       IMAGING:    CT Abdomen Pelvis Without Contrast  (08/26/2023 22:30)  MRI abdomen w wo contrast mrcp (08/27/2023 15:08)    CT and MRI images personally reviewed.  Mass within the pancreas head and uncinate process with resulting biliary dilation.  Cystic left adnexal mass.    ASSESSMENT:  This is a 78 y.o. female with:    *Pancreas mass, suspected malignancy  She presented with painless jaundice and confusion.  On 8/26/2023 the bilirubin was 33.2 with an AST of 163 and .    INR 1.72.    CA 19-9 2836.    CT imaging of the abdomen and pelvis on 8/26/2023 showed biliary obstruction with severe intra and extrahepatic ductal dilation.  The common bile duct became abruptly decompressed as it projected through the pancreas head.  Distended gallbladder.  A few nodular areas of pulmonary opacification of both lung bases were nonspecific.  A left adnexal cystic lesion measures 5.7 cm, incompletely evaluated.    MRI of the abdomen on 8/27/2023 showed a hypoenhancing pancreas mass centered within the uncinate process and pancreas head with obstruction of the common bile duct and severe upstream intra and extrahepatic biliary ductal dilation.  Gallbladder with a thickened hyperenhancing appearance suggestive of cholecystitis.  Pancreas divisum noted.  A cystic lesion within the left aspect of the pelvis was partially visualized.    *Cystic left adnexal mass  May need TVUS    RECOMMENDATIONS/PLAN:   Agree with ERCP, planned for tomorrow  Bilirubin and labs will need to improve significantly before she will be a candidate for any systemic therapy  F/u results from CT C/A/P today  Will follow peripherally for now.    Jeffy Sanches MD

## 2023-08-29 NOTE — PLAN OF CARE
"Goal Outcome Evaluation:         Pt c/o itching and insomnia throughout most of the shift and received PRN Ambien and PRN Atarax; effective but only lasted a few hours and pt said itching returned.  Obtained one-time dose IV Benadryl and administered; effective for a while.  Pt c/o itching again this AM and was again treated with PRN Atarax.  Pt up ad henrique to BR; stool sample awaited to send for occult sample due to pt stating \"black loose stools\" yesterday but none yet visualized or collected.  Potty hat in place to collect.    Potassium slightly low this AM at 3.5.  Protocol BPA prompt acknowledged and first dose administered.  Pt has had no solid foods since MN and is allowed to drink clears only until 1000 as she has a ERCP planned today at 1700.  Bilirubin levels still high but trending down.      Still awaiting CT of chest/abdomen.         Problem: Adult Inpatient Plan of Care  Goal: Plan of Care Review  Outcome: Ongoing, Progressing  Goal: Absence of Hospital-Acquired Illness or Injury  Outcome: Ongoing, Progressing  Intervention: Identify and Manage Fall Risk  Description: Perform standard risk assessment on admission using a validated tool or comprehensive approach appropriate to the patient; reassess fall risk frequently, with change in status or transfer to another level of care.  Communicate fall injury risk to interprofessional healthcare team.  Determine need for increased observation, equipment and environmental modification, such as low bed, signage and supportive, nonskid footwear.  Adjust safety measures to individual developmental age, stage and identified risk factors.  Reinforce the importance of safety and physical activity with patient and family.  Perform regular intentional rounding to assess need for position change, pain assessment and personal needs, including assistance with toileting.  Recent Flowsheet Documentation  Taken 8/29/2023 7117 by Kelsey Magana, RN  Safety Promotion/Fall " Prevention: safety round/check completed  Taken 8/29/2023 0410 by Kelsey Magana RN  Safety Promotion/Fall Prevention: safety round/check completed  Taken 8/29/2023 0208 by Kelsey Magana RN  Safety Promotion/Fall Prevention: safety round/check completed  Taken 8/28/2023 2359 by Kelsey Magana RN  Safety Promotion/Fall Prevention: safety round/check completed  Taken 8/28/2023 2202 by Kelsey Magana RN  Safety Promotion/Fall Prevention: safety round/check completed  Taken 8/28/2023 2048 by Kelsey Magana RN  Safety Promotion/Fall Prevention: safety round/check completed  Intervention: Prevent Skin Injury  Description: Perform a screening for skin injury risk, such as pressure or moisture associated skin damage on admission and at regular intervals throughout hospital stay.  Keep all areas of skin (especially folds) clean and dry.  Maintain adequate skin hydration.  Relieve and redistribute pressure and protect bony prominences; implement measures based on patient-specific risk factors.  Match turning and repositioning schedule to clinical condition.  Encourage weight shift frequently; assist with reposition if unable to complete independently.  Float heels off bed; avoid pressure on the Achilles tendon.  Keep skin free from extended contact with medical devices.  Encourage functional activity and mobility, as early as tolerated.  Use aids (e.g., slide boards, mechanical lift) during transfer.  Recent Flowsheet Documentation  Taken 8/29/2023 0553 by Kelsye Magana RN  Body Position:   position changed independently   left   side-lying  Taken 8/29/2023 0410 by Kelsey Magana RN  Body Position:   position changed independently   left  Taken 8/29/2023 0208 by Kelsey Magana RN  Body Position:   position changed independently   supine  Taken 8/28/2023 2359 by Kelsey Magana RN  Body Position:   position changed independently   right   side-lying  Skin Protection:   adhesive use limited    transparent dressing maintained   tubing/devices free from skin contact  Taken 8/28/2023 2202 by Kelsey Magana RN  Body Position:   position changed independently   supine   right  Taken 8/28/2023 2048 by Kelsey Magana RN  Body Position:   position changed independently   sitting up in bed  Skin Protection:   adhesive use limited   tubing/devices free from skin contact   transparent dressing maintained  Intervention: Prevent and Manage VTE (Venous Thromboembolism) Risk  Description: Assess for VTE (venous thromboembolism) risk.  Encourage and assist with early ambulation.  Initiate and maintain compression or other therapy, as indicated, based on identified risk in accordance with organizational protocol and provider order.  Encourage both active and passive leg exercises while in bed, if unable to ambulate.  Recent Flowsheet Documentation  Taken 8/28/2023 2359 by Kelsey Magana RN  Activity Management:   activity encouraged   up ad henrique  VTE Prevention/Management:   bilateral   sequential compression devices off   patient refused intervention  Taken 8/28/2023 2048 by Kelsey Magana RN  Activity Management:   activity encouraged   up ad henrique  VTE Prevention/Management:   bilateral   sequential compression devices off   patient refused intervention  Goal: Optimal Comfort and Wellbeing  Outcome: Ongoing, Progressing  Intervention: Provide Person-Centered Care  Description: Use a family-focused approach to care.  Develop trust and rapport by proactively providing information, encouraging questions, addressing concerns and offering reassurance.  Acknowledge emotional response to hospitalization.  Recognize and utilize personal coping strategies.  Honor spiritual and cultural preferences.  Recent Flowsheet Documentation  Taken 8/28/2023 2359 by Kelsey Magana RN  Trust Relationship/Rapport:   care explained   choices provided   questions answered   questions encouraged   reassurance provided    thoughts/feelings acknowledged   empathic listening provided  Taken 8/28/2023 2048 by Kelsey Magana, RN  Trust Relationship/Rapport:   care explained   choices provided   reassurance provided   thoughts/feelings acknowledged   questions encouraged   questions answered   emotional support provided  Goal: Readiness for Transition of Care  Outcome: Ongoing, Progressing

## 2023-08-30 ENCOUNTER — HOME HEALTH ADMISSION (OUTPATIENT)
Dept: HOME HEALTH SERVICES | Facility: HOME HEALTHCARE | Age: 78
End: 2023-08-30
Payer: MEDICARE

## 2023-08-30 ENCOUNTER — APPOINTMENT (OUTPATIENT)
Dept: ULTRASOUND IMAGING | Facility: HOSPITAL | Age: 78
DRG: 435 | End: 2023-08-30
Payer: MEDICARE

## 2023-08-30 PROBLEM — K86.89 PANCREATIC MASS: Status: ACTIVE | Noted: 2023-08-30

## 2023-08-30 LAB
ALBUMIN SERPL-MCNC: 2.9 G/DL (ref 3.5–5.2)
ALBUMIN/GLOB SERPL: 1.4 G/DL
ALP SERPL-CCNC: 206 U/L (ref 39–117)
ALT SERPL W P-5'-P-CCNC: 99 U/L (ref 1–33)
ANION GAP SERPL CALCULATED.3IONS-SCNC: 10 MMOL/L (ref 5–15)
AST SERPL-CCNC: 115 U/L (ref 1–32)
BASOPHILS # BLD AUTO: 0.04 10*3/MM3 (ref 0–0.2)
BASOPHILS NFR BLD AUTO: 0.8 % (ref 0–1.5)
BILIRUB SERPL-MCNC: 25.9 MG/DL (ref 0–1.2)
BUN SERPL-MCNC: 9 MG/DL (ref 8–23)
BUN/CREAT SERPL: 12 (ref 7–25)
CALCIUM SPEC-SCNC: 8.8 MG/DL (ref 8.6–10.5)
CHLORIDE SERPL-SCNC: 103 MMOL/L (ref 98–107)
CO2 SERPL-SCNC: 24 MMOL/L (ref 22–29)
CREAT SERPL-MCNC: 0.75 MG/DL (ref 0.57–1)
DEPRECATED RDW RBC AUTO: 61.2 FL (ref 37–54)
EGFRCR SERPLBLD CKD-EPI 2021: 80.6 ML/MIN/1.73
EOSINOPHIL # BLD AUTO: 0.12 10*3/MM3 (ref 0–0.4)
EOSINOPHIL NFR BLD AUTO: 2.5 % (ref 0.3–6.2)
ERYTHROCYTE [DISTWIDTH] IN BLOOD BY AUTOMATED COUNT: 20.6 % (ref 12.3–15.4)
GLOBULIN UR ELPH-MCNC: 2.1 GM/DL
GLUCOSE SERPL-MCNC: 102 MG/DL (ref 65–99)
HCT VFR BLD AUTO: 30.6 % (ref 34–46.6)
HGB BLD-MCNC: 11.1 G/DL (ref 12–15.9)
IMM GRANULOCYTES # BLD AUTO: 0.01 10*3/MM3 (ref 0–0.05)
IMM GRANULOCYTES NFR BLD AUTO: 0.2 % (ref 0–0.5)
LYMPHOCYTES # BLD AUTO: 0.9 10*3/MM3 (ref 0.7–3.1)
LYMPHOCYTES NFR BLD AUTO: 18.7 % (ref 19.6–45.3)
MCH RBC QN AUTO: 29.9 PG (ref 26.6–33)
MCHC RBC AUTO-ENTMCNC: 36.3 G/DL (ref 31.5–35.7)
MCV RBC AUTO: 82.5 FL (ref 79–97)
MONOCYTES # BLD AUTO: 0.74 10*3/MM3 (ref 0.1–0.9)
MONOCYTES NFR BLD AUTO: 15.4 % (ref 5–12)
NEUTROPHILS NFR BLD AUTO: 3 10*3/MM3 (ref 1.7–7)
NEUTROPHILS NFR BLD AUTO: 62.4 % (ref 42.7–76)
NRBC BLD AUTO-RTO: 0 /100 WBC (ref 0–0.2)
PLATELET # BLD AUTO: 331 10*3/MM3 (ref 140–450)
PMV BLD AUTO: 10.6 FL (ref 6–12)
POTASSIUM SERPL-SCNC: 3.7 MMOL/L (ref 3.5–5.2)
PROT SERPL-MCNC: 5 G/DL (ref 6–8.5)
RBC # BLD AUTO: 3.71 10*6/MM3 (ref 3.77–5.28)
SODIUM SERPL-SCNC: 137 MMOL/L (ref 136–145)
WBC NRBC COR # BLD: 4.81 10*3/MM3 (ref 3.4–10.8)

## 2023-08-30 PROCEDURE — 76856 US EXAM PELVIC COMPLETE: CPT

## 2023-08-30 PROCEDURE — 76830 TRANSVAGINAL US NON-OB: CPT

## 2023-08-30 PROCEDURE — 99232 SBSQ HOSP IP/OBS MODERATE 35: CPT | Performed by: INTERNAL MEDICINE

## 2023-08-30 PROCEDURE — 85025 COMPLETE CBC W/AUTO DIFF WBC: CPT | Performed by: INTERNAL MEDICINE

## 2023-08-30 PROCEDURE — 80053 COMPREHEN METABOLIC PANEL: CPT | Performed by: INTERNAL MEDICINE

## 2023-08-30 RX ORDER — LACTULOSE 10 G/15ML
10 SOLUTION ORAL 2 TIMES DAILY PRN
Qty: 473 ML | Refills: 0 | Status: ON HOLD | OUTPATIENT
Start: 2023-08-30

## 2023-08-30 RX ORDER — HYDROXYZINE HYDROCHLORIDE 25 MG/1
25 TABLET, FILM COATED ORAL 3 TIMES DAILY PRN
Qty: 30 TABLET | Refills: 0 | Status: ON HOLD | OUTPATIENT
Start: 2023-08-30

## 2023-08-30 RX ADMIN — Medication 10 ML: at 08:07

## 2023-08-30 RX ADMIN — METOPROLOL SUCCINATE 25 MG: 25 TABLET, EXTENDED RELEASE ORAL at 08:07

## 2023-08-30 RX ADMIN — LACTULOSE 10 G: 10 SOLUTION ORAL at 20:34

## 2023-08-30 RX ADMIN — HYDROXYZINE HYDROCHLORIDE 25 MG: 25 TABLET ORAL at 00:13

## 2023-08-30 RX ADMIN — SODIUM CHLORIDE, POTASSIUM CHLORIDE, SODIUM LACTATE AND CALCIUM CHLORIDE 100 ML/HR: 600; 310; 30; 20 INJECTION, SOLUTION INTRAVENOUS at 14:17

## 2023-08-30 RX ADMIN — ZOLPIDEM TARTRATE 2.5 MG: 5 TABLET ORAL at 21:53

## 2023-08-30 RX ADMIN — HYDROXYZINE HYDROCHLORIDE 25 MG: 25 TABLET ORAL at 08:43

## 2023-08-30 RX ADMIN — HYDROXYZINE HYDROCHLORIDE 25 MG: 25 TABLET ORAL at 21:53

## 2023-08-30 RX ADMIN — LACTULOSE 10 G: 10 SOLUTION ORAL at 08:07

## 2023-08-30 RX ADMIN — SODIUM CHLORIDE, POTASSIUM CHLORIDE, SODIUM LACTATE AND CALCIUM CHLORIDE 100 ML/HR: 600; 310; 30; 20 INJECTION, SOLUTION INTRAVENOUS at 08:43

## 2023-08-30 RX ADMIN — Medication 10 ML: at 20:34

## 2023-08-30 NOTE — PROGRESS NOTES
Name: Lizzie Bose ADMIT: 2023   : 1945  PCP: Ct Alford APRN    MRN: [id number] LOS: 3 days   AGE/SEX: 78 y.o. female  ROOM: [facility]     Subjective   Subjective   Chief Complaint   Patient presents with    Hypertension    Rapid Heart Rate      Not reporting any abdominal pain currently.  No nausea vomiting or diarrhea.  No fevers or chills. Had  ERCP yesterday. Some itching     Objective   Objective   Vital Signs  Temp:  [97.3 °F (36.3 °C)-98.8 °F (37.1 °C)] 98.8 °F (37.1 °C)  Heart Rate:  [64-92] 69  Resp:  [13-18] 18  BP: ()/(59-81) 146/69  SpO2:  [95 %-100 %] 99 %  on  Flow (L/min):  [2-4] 2;   Device (Oxygen Therapy): room air  Body mass index is 21.63 kg/m².    Physical Exam  Vitals and nursing note reviewed.   Constitutional:       General: She is not in acute distress.     Appearance: She is ill-appearing. She is not diaphoretic.   HENT:      Head: Atraumatic.   Eyes:      General: Scleral icterus present.      Pupils: Pupils are equal, round, and reactive to light.   Pulmonary:      Effort: Pulmonary effort is normal.      Breath sounds: No wheezing.   Abdominal:      General: There is no distension.      Palpations: Abdomen is soft.      Tenderness: There is no abdominal tenderness. There is no guarding or rebound.   Musculoskeletal:         General: No swelling or tenderness.   Skin:     General: Skin is dry.      Coloration: Skin is jaundiced.   Neurological:      Mental Status: She is alert.      Cranial Nerves: No cranial nerve deficit.   Psychiatric:         Mood and Affect: Mood normal.         Behavior: Behavior normal.     Results Review  I reviewed the patient's new clinical results.    Results from last 7 days   Lab Units 23  0549 23  0448 23  0458 23  0540   WBC 10*3/mm3 4.81 5.89 6.04 4.94   HEMOGLOBIN g/dL 11.1* 11.6* 11.4* 13.2   PLATELETS 10*3/mm3 331 316 301 330       Results from last 7 days   Lab Units 23  0549 23  1449  08/29/23  0448 08/28/23  0458 08/28/23  0028 08/27/23  0540   SODIUM mmol/L 137  --  136 134*  --  138   POTASSIUM mmol/L 3.7 4.2 3.5 4.2   < > 2.8*   CHLORIDE mmol/L 103  --  103 104  --  103   CO2 mmol/L 24.0  --  21.0* 18.2*  --  19.0*   BUN mg/dL 9  --  13 18  --  12   CREATININE mg/dL 0.75  --  0.70 0.78  --  0.89   GLUCOSE mg/dL 102*  --  94 116*  --  118*   EGFR mL/min/1.73 80.6  --  87.6 76.9  --  65.6    < > = values in this interval not displayed.       Results from last 7 days   Lab Units 08/30/23  0549 08/29/23  0448 08/28/23  0458 08/26/23  2156   ALBUMIN g/dL 2.9* 2.7* 2.9* 3.7   BILIRUBIN mg/dL 25.9* 27.7* 28.8* 33.2*   ALK PHOS U/L 206* 201* 196* 284*   AST (SGOT) U/L 115* 115* 130* 163*   ALT (SGPT) U/L 99* 101* 112* 144*       Results from last 7 days   Lab Units 08/30/23  0549 08/29/23  0448 08/28/23  0458 08/27/23  0540 08/26/23  2156   CALCIUM mg/dL 8.8 8.6 8.4* 8.9 9.2   ALBUMIN g/dL 2.9* 2.7* 2.9*  --  3.7   MAGNESIUM mg/dL  --   --   --  3.4*  --            No results found for: HGBA1C, POCGLU    CT Chest With Contrast Diagnostic    Result Date: 8/29/2023  1. Primary pancreatic neoplasm within the posterior pancreatic head is better demonstrated on the most recent MRI. This is causing common bile duct obstruction. The gallbladder is distended with wall thickening and enhancement. There is no definite evidence of vascular invasion on CT. Sub-5 mm pulmonary nodules that are indeterminate and attention on followup is recommended. 2. Moderate to large sized hiatal hernia. 3. There is suggestion of endometrial thickening as well as a cystic lesion within the left adnexa measuring up to 5.8 cm. Followup with pelvic sonogram is recommended.  Radiation dose reduction techniques were utilized, including automated exposure control and exposure modulation based on body size.   This report was finalized on 8/29/2023 1:14 PM by Dr. Walter Linda M.D.      CT Abdomen Pelvis With Contrast    Result Date:  8/29/2023  1. Primary pancreatic neoplasm within the posterior pancreatic head is better demonstrated on the most recent MRI. This is causing common bile duct obstruction. The gallbladder is distended with wall thickening and enhancement. There is no definite evidence of vascular invasion on CT. Sub-5 mm pulmonary nodules that are indeterminate and attention on followup is recommended. 2. Moderate to large sized hiatal hernia. 3. There is suggestion of endometrial thickening as well as a cystic lesion within the left adnexa measuring up to 5.8 cm. Followup with pelvic sonogram is recommended.  Radiation dose reduction techniques were utilized, including automated exposure control and exposure modulation based on body size.   This report was finalized on 8/29/2023 1:14 PM by Dr. Walter Linda M.D.      FL ercp biliary duct only    Result Date: 8/30/2023   As described.  This report was finalized on 8/30/2023 8:37 AM by Dr. Seth Han M.D.       I have personally reviewed all medications:  Scheduled Medications  lactulose, 10 g, Oral, BID  metoprolol succinate XL, 25 mg, Oral, Q24H  senna-docusate sodium, 2 tablet, Oral, BID  sodium chloride, 10 mL, Intravenous, Q12H      Infusions  lactated ringers, 100 mL/hr, Last Rate: 100 mL/hr (08/30/23 0843)      Diet  Diet: Cardiac Diets; Healthy Heart (2-3 Na+); Texture: Regular Texture (IDDSI 7); Fluid Consistency: Thin (IDDSI 0)    I have personally reviewed:  [x]  Laboratory   []  Microbiology   [x]  Radiology   [x]  EKG/Telemetry  []  Cardiology/Vascular   []  Pathology    []  Records       Assessment/Plan     Active Hospital Problems    Diagnosis  POA    **Biliary obstruction [K83.1]  Yes    Pancreatic mass [K86.89]  Unknown    Elevated liver enzymes [R74.8]  Yes    Dyslipidemia [E78.5]  Yes    Chronic insomnia [F51.04]  Yes    Benign essential hypertension [I10]  Yes    PAT (obstructive sleep apnea) [G47.33]  Yes      Resolved Hospital Problems   No resolved  problems to display.       78 y.o. female admitted with Biliary obstruction.    Biliary obstruction/painless jaundice: Pancreatic mass noted on MRCP.  CA 19-9 is very elevated.  She also has some distention of gallbladder but no current evidence of cholangitis with no fever right upper quadrant pain or leukocytosis.  ERCP done 8/29.  GI following. With high liklihood as cancer so Oncology has seen. Likely will follow up as outpatient with Oncology and GI for BX results and further plans.         Adnexal cyst: Pelvic ultrasound ordered by Oncology  Anxiety: Atarax as needed.   Hypertension: She had stopped taking her home medicines prior to admission.  Prior beta-blocker was resumed at a lower dose and blood pressure is acceptable acutely.  Ppx: SCD  Disposition: likely to home on 8/31 with family help    CARRINGTON Palmer MD  [facility]  08/30/23  13:32 EDT    Dictated portions of note using dragon dictation software.  Copied text in this note has been reviewed by me and remains accurate as of 08/30/23

## 2023-08-30 NOTE — PLAN OF CARE
Goal Outcome Evaluation:  Plan of Care Reviewed With: patient          Problem: Adult Inpatient Plan of Care  Goal: Plan of Care Review  Outcome: Ongoing, Progressing  Flowsheets (Taken 8/30/2023 1643)  Progress: improving  Plan of Care Reviewed With: patient  Outcome Evaluation: Vitals stable. Pt disoriented to situation. Pt to be dc home w son tomorrow 8/31. Pt needs met and questions answered. Will continue to monitor.

## 2023-08-30 NOTE — PROGRESS NOTES
[facility] INPATIENT PROGRESS NOTE    Length of Stay:  3 days    CHIEF COMPLAINT/REASON FOR VISIT:  Pancreas mass    SUBJECTIVE: Afebrile.  Normotensive to mildly hypotensive at times.  On room air.  Required oxygen yesterday evening.  Complains of pruritus and some mild confusion.    ROS:  14 systems reviewed with pertinent positives and negatives in the HPI. Reviewed today.    OBJECTIVE:  Vitals:    08/29/23 1926 08/29/23 1948 08/29/23 2308 08/30/23 0727   BP: 154/81 106/72 124/65 144/76   BP Location: Left arm Left arm Left arm Right arm   Patient Position: Lying Lying Lying Lying   Pulse: 88 87 71    Resp: 14 16 16 17   Temp: 97.7 °F (36.5 °C) 97.5 °F (36.4 °C) 97.3 °F (36.3 °C) 97.7 °F (36.5 °C)   TempSrc: Oral Oral Oral Oral   SpO2: 99% 100% 99% 100%   Weight:       Height:             PHYSICAL EXAMINATION:   General:  No acute distress, awake, alert and oriented  Skin: Significantly jaundiced  HEENT:  Normocephalic/atraumatic.  Sclera are icteric  Chest:  Normal respiratory effort  Extremities:  No visible clubbing, cyanosis, or edema  Neuro/psych: Answers questions appropriately       DIAGNOSTIC DATA:  Results Review:     I reviewed the patient's new clinical results.    Results from last 7 days   Lab Units 08/30/23  0549   WBC 10*3/mm3 4.81   HEMOGLOBIN g/dL 11.1*   HEMATOCRIT % 30.6*   PLATELETS 10*3/mm3 331     Lab Results   Component Value Date    NEUTROABS 3.00 08/30/2023     Results from last 7 days   Lab Units 08/30/23  0549   SODIUM mmol/L 137   POTASSIUM mmol/L 3.7   CHLORIDE mmol/L 103   CO2 mmol/L 24.0   BUN mg/dL 9   CREATININE mg/dL 0.75   GLUCOSE mg/dL 102*   CALCIUM mg/dL 8.8     Results from last 7 days   Lab Units 08/29/23  0814 08/28/23  0458 08/27/23  0540 08/26/23  2156   INR  1.27* 2.73* 2.01* 1.72*   APTT seconds  --   --   --  32.3     Results from last 7 days   Lab Units 08/27/23  0540   MAGNESIUM mg/dL 3.4*         IMAGING:    CT Abdomen Pelvis With Contrast (08/29/2023  09:39)  CT Chest With Contrast Diagnostic (08/29/2023 09:39)    CT images of the chest abdomen and pelvis from 8/29/2023 personally reviewed.  Pancreas mass in the posterior pancreatic head with biliary obstruction.  Distended gallbladder.  Indeterminate tiny pulmonary nodules less than 5 mm.  Cystic left adnexal lesion    ASSESSMENT:  This is a 78 y.o. female with:     *Pancreas mass, suspected malignancy, clinical T2, N0 at this point if pancreatic malignancy confirmed  She presented with painless jaundice and confusion.  On 8/26/2023 the bilirubin was 33.2 with an AST of 163 and .    INR 1.72.    CA 19-9 2836.    CT imaging of the abdomen and pelvis on 8/26/2023 showed biliary obstruction with severe intra and extrahepatic ductal dilation.  The common bile duct became abruptly decompressed as it projected through the pancreas head.  Distended gallbladder.  A few nodular areas of pulmonary opacification of both lung bases were nonspecific.  A left adnexal cystic lesion measures 5.7 cm, incompletely evaluated.    MRI of the abdomen on 8/27/2023 showed a hypoenhancing pancreas mass centered within the uncinate process and pancreas head with obstruction of the common bile duct and severe upstream intra and extrahepatic biliary ductal dilation.  The mass measures 2.8 x 1.3 cm.  Gallbladder with a thickened hyperenhancing appearance suggestive of cholecystitis.  Pancreas divisum noted.  A cystic lesion within the left aspect of the pelvis was partially visualized.  Dr. Mg performed an ERCP with sphincterotomy, brushing, and stent placement on 8/29/2023.  High-grade obstruction in the distal third of the common bile duct was noted.  Cytology pending  CT imaging chest abdomen and pelvis on 8/29/2023.  Pancreas mass in the posterior pancreatic head with biliary obstruction.  Distended gallbladder.  Indeterminate tiny pulmonary nodules less than 5 mm.  Cystic left adnexal lesion     *Elevated bilirubin secondary  to obstruction  Bilirubin 25.9, from 27.7, from 28.8    *Elevated transaminases likely from biliary obstruction  , from 115, from 130  ALT 99, from 101, from 112    *Cystic left adnexal mass  Needs pelvic ultrasound for further assessment    *Mild normocytic anemia     RECOMMENDATIONS/PLAN:   Follow-up cytology pending from 8/29  Pelvic ultrasound  Bilirubin and labs will need to improve significantly before she will be a candidate for any systemic therapy  If pancreatic malignancy is confirmed, it is extremely doubtful that it 78 years old she would be a candidate for a Whipple procedure even for early stage disease.  A surgeon would need to answer that question definitively.  As she does appear to have localized disease otherwise (aside from the adnexal mass, etiology undetermined), radiation plus or minus chemotherapy could be considered, again assuming malignancy is confirmed with cytology    We will follow and arrange follow-up in the office and appropriate      Jeffy Sanches MD

## 2023-08-30 NOTE — DISCHARGE PLACEMENT REQUEST
"Lizzie Bose (78 y.o. Female)       Date of Birth   1945    Social Security Number       Address   [address] [place] [address]    Home Phone   [phone]    MRN   [id number]       Alevism   None    Marital Status                               Admission Date   8/26/23    Admission Type   Emergency    Admitting Provider   Ankur Lehman MD    Attending Provider   Van Palmer MD    Department, Room/Bed   [address], E459/1       Discharge Date       Discharge Disposition   Home or Self Care    Discharge Destination                                 Attending Provider: Van Palmer MD    Allergies: Atorvastatin    Isolation: None   Infection: None   Code Status: CPR    Ht: 165.1 cm (65\")   Wt: 59 kg (130 lb)    Admission Cmt: None   Principal Problem: Biliary obstruction [K83.1]                   Active Insurance as of 8/26/2023       Primary Coverage       Payor Plan Insurance Group Employer/Plan Group    MEDICARE MEDICARE A & B        Payor Plan Address Payor Plan Phone Number Payor Plan Fax Number Effective Dates    [address] [phone]  4/1/2008 - None Entered    [facility] [place] [address]         Subscriber Name Subscriber Birth Date Member ID       LIZZIE BOSE 1945 [id number]               Secondary Coverage       Payor Plan Insurance Group Employer/Plan Group    AARP MC SUP AARP HEALTH CARE OPTIONS        Payor Plan Address Payor Plan Phone Number Payor Plan Fax Number Effective Dates    [facility] [phone]  8/26/2023 - None Entered    [address]       [facility] [address]         Subscriber Name Subscriber Birth Date Member ID       LIZZIE BOSE 1945 [id number]                     Emergency Contacts        (Rel.) Home Phone Work Phone Mobile Phone    YISEL BOSE (Son) -- -- [phone]    zak bose -- -- [phone]                "
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
## 2023-08-31 ENCOUNTER — NURSE TRIAGE (OUTPATIENT)
Dept: CALL CENTER | Facility: HOSPITAL | Age: 78
End: 2023-08-31
Payer: MEDICARE

## 2023-08-31 VITALS
OXYGEN SATURATION: 100 % | HEIGHT: 65 IN | BODY MASS INDEX: 21.66 KG/M2 | RESPIRATION RATE: 18 BRPM | SYSTOLIC BLOOD PRESSURE: 161 MMHG | TEMPERATURE: 98.8 F | HEART RATE: 78 BPM | DIASTOLIC BLOOD PRESSURE: 83 MMHG | WEIGHT: 130 LBS

## 2023-08-31 LAB
ALBUMIN SERPL-MCNC: 2.9 G/DL (ref 3.5–5.2)
ALBUMIN/GLOB SERPL: 1 G/DL
ALP SERPL-CCNC: 217 U/L (ref 39–117)
ALT SERPL W P-5'-P-CCNC: 94 U/L (ref 1–33)
ANION GAP SERPL CALCULATED.3IONS-SCNC: 12 MMOL/L (ref 5–15)
AST SERPL-CCNC: 100 U/L (ref 1–32)
BASOPHILS # BLD AUTO: 0.05 10*3/MM3 (ref 0–0.2)
BASOPHILS NFR BLD AUTO: 0.9 % (ref 0–1.5)
BILIRUB SERPL-MCNC: 22.1 MG/DL (ref 0–1.2)
BUN SERPL-MCNC: 8 MG/DL (ref 8–23)
BUN/CREAT SERPL: 11.3 (ref 7–25)
CALCIUM SPEC-SCNC: 8.8 MG/DL (ref 8.6–10.5)
CHLORIDE SERPL-SCNC: 101 MMOL/L (ref 98–107)
CO2 SERPL-SCNC: 24 MMOL/L (ref 22–29)
CREAT SERPL-MCNC: 0.71 MG/DL (ref 0.57–1)
CYTO UR: NORMAL
DEPRECATED RDW RBC AUTO: 64.3 FL (ref 37–54)
EGFRCR SERPLBLD CKD-EPI 2021: 86.1 ML/MIN/1.73
EOSINOPHIL # BLD AUTO: 0.1 10*3/MM3 (ref 0–0.4)
EOSINOPHIL NFR BLD AUTO: 1.7 % (ref 0.3–6.2)
ERYTHROCYTE [DISTWIDTH] IN BLOOD BY AUTOMATED COUNT: 21 % (ref 12.3–15.4)
GLOBULIN UR ELPH-MCNC: 2.8 GM/DL
GLUCOSE SERPL-MCNC: 117 MG/DL (ref 65–99)
HCT VFR BLD AUTO: 32.4 % (ref 34–46.6)
HGB BLD-MCNC: 11.4 G/DL (ref 12–15.9)
IMM GRANULOCYTES # BLD AUTO: 0.02 10*3/MM3 (ref 0–0.05)
IMM GRANULOCYTES NFR BLD AUTO: 0.3 % (ref 0–0.5)
LAB AP CASE REPORT: NORMAL
LAB AP DIAGNOSIS COMMENT: NORMAL
LYMPHOCYTES # BLD AUTO: 1 10*3/MM3 (ref 0.7–3.1)
LYMPHOCYTES NFR BLD AUTO: 17.5 % (ref 19.6–45.3)
MCH RBC QN AUTO: 29.8 PG (ref 26.6–33)
MCHC RBC AUTO-ENTMCNC: 35.2 G/DL (ref 31.5–35.7)
MCV RBC AUTO: 84.6 FL (ref 79–97)
MONOCYTES # BLD AUTO: 1.01 10*3/MM3 (ref 0.1–0.9)
MONOCYTES NFR BLD AUTO: 17.6 % (ref 5–12)
NEUTROPHILS NFR BLD AUTO: 3.55 10*3/MM3 (ref 1.7–7)
NEUTROPHILS NFR BLD AUTO: 62 % (ref 42.7–76)
NRBC BLD AUTO-RTO: 0 /100 WBC (ref 0–0.2)
PATH REPORT.FINAL DX SPEC: NORMAL
PATH REPORT.GROSS SPEC: NORMAL
PLATELET # BLD AUTO: 367 10*3/MM3 (ref 140–450)
PMV BLD AUTO: 9.6 FL (ref 6–12)
POTASSIUM SERPL-SCNC: 3.3 MMOL/L (ref 3.5–5.2)
PROT SERPL-MCNC: 5.7 G/DL (ref 6–8.5)
RBC # BLD AUTO: 3.83 10*6/MM3 (ref 3.77–5.28)
SODIUM SERPL-SCNC: 137 MMOL/L (ref 136–145)
WBC NRBC COR # BLD: 5.73 10*3/MM3 (ref 3.4–10.8)

## 2023-08-31 PROCEDURE — 99232 SBSQ HOSP IP/OBS MODERATE 35: CPT

## 2023-08-31 PROCEDURE — 85025 COMPLETE CBC W/AUTO DIFF WBC: CPT | Performed by: INTERNAL MEDICINE

## 2023-08-31 PROCEDURE — 63710000001 ONDANSETRON ODT 4 MG TABLET DISPERSIBLE: Performed by: INTERNAL MEDICINE

## 2023-08-31 PROCEDURE — 63710000001 DIPHENHYDRAMINE PER 50 MG: Performed by: NURSE PRACTITIONER

## 2023-08-31 PROCEDURE — 80053 COMPREHEN METABOLIC PANEL: CPT | Performed by: INTERNAL MEDICINE

## 2023-08-31 PROCEDURE — 99232 SBSQ HOSP IP/OBS MODERATE 35: CPT | Performed by: INTERNAL MEDICINE

## 2023-08-31 RX ORDER — ONDANSETRON 8 MG/1
8 TABLET, ORALLY DISINTEGRATING ORAL EVERY 8 HOURS PRN
Qty: 15 TABLET | Refills: 0 | Status: ON HOLD | OUTPATIENT
Start: 2023-08-31 | End: 2023-09-10

## 2023-08-31 RX ORDER — CETIRIZINE HYDROCHLORIDE 10 MG/1
10 TABLET ORAL DAILY
Status: DISCONTINUED | OUTPATIENT
Start: 2023-08-31 | End: 2023-08-31 | Stop reason: HOSPADM

## 2023-08-31 RX ORDER — CETIRIZINE HYDROCHLORIDE 10 MG/1
10 TABLET ORAL DAILY
Qty: 30 TABLET | Refills: 0 | Status: ON HOLD | OUTPATIENT
Start: 2023-09-01

## 2023-08-31 RX ORDER — DIPHENHYDRAMINE HCL 25 MG
25 CAPSULE ORAL ONCE
Status: COMPLETED | OUTPATIENT
Start: 2023-08-31 | End: 2023-08-31

## 2023-08-31 RX ORDER — ONDANSETRON 4 MG/1
8 TABLET, ORALLY DISINTEGRATING ORAL EVERY 8 HOURS PRN
Status: DISCONTINUED | OUTPATIENT
Start: 2023-08-31 | End: 2023-08-31 | Stop reason: HOSPADM

## 2023-08-31 RX ADMIN — LACTULOSE 10 G: 10 SOLUTION ORAL at 08:55

## 2023-08-31 RX ADMIN — HYDROXYZINE HYDROCHLORIDE 25 MG: 25 TABLET ORAL at 09:02

## 2023-08-31 RX ADMIN — ONDANSETRON 8 MG: 4 TABLET, ORALLY DISINTEGRATING ORAL at 12:05

## 2023-08-31 RX ADMIN — METOPROLOL SUCCINATE 25 MG: 25 TABLET, EXTENDED RELEASE ORAL at 08:55

## 2023-08-31 RX ADMIN — DIPHENHYDRAMINE HYDROCHLORIDE 25 MG: 25 CAPSULE ORAL at 00:24

## 2023-08-31 RX ADMIN — SODIUM CHLORIDE, POTASSIUM CHLORIDE, SODIUM LACTATE AND CALCIUM CHLORIDE 100 ML/HR: 600; 310; 30; 20 INJECTION, SOLUTION INTRAVENOUS at 03:46

## 2023-08-31 RX ADMIN — CETIRIZINE HYDROCHLORIDE 10 MG: 10 TABLET ORAL at 09:42

## 2023-08-31 RX ADMIN — Medication 10 ML: at 08:56

## 2023-08-31 NOTE — DISCHARGE SUMMARY
NAME: Lizzie Bose ADMIT: 2023   : 1945  PCP: Ct Alford APRN    MRN: [id number] LOS: 4 days   AGE/SEX: 78 y.o. female  ROOM: [facility]     Date of Admission:  2023  Date of Discharge:  2023    PCP: Ct Alford APRN    CHIEF COMPLAINT  Hypertension and Rapid Heart Rate      DISCHARGE DIAGNOSIS  Active Hospital Problems    Diagnosis  POA    **Biliary obstruction [K83.1]  Yes    Pancreatic mass [K86.89]  Yes    Elevated liver enzymes [R74.8]  Yes    Dyslipidemia [E78.5]  Yes    Chronic insomnia [F51.04]  Yes    Benign essential hypertension [I10]  Yes    PAT (obstructive sleep apnea) [G47.33]  Yes      Resolved Hospital Problems   No resolved problems to display.       SECONDARY DIAGNOSES  Past Medical History:   Diagnosis Date    ADD (attention deficit disorder)     Arthritis     Depression     High blood pressure     History of Clostridioides difficile infection     Hyperlipidemia     Low back pain     Right knee pain     Sleep apnea     NO USING MACHINE       CONSULTS   GI  Oncology    HOSPITAL COURSE  Patient is a 78 y.o. female who presented with painless jaundice and has pancreatic mass.  CA 19-9 is elevated.  I told her this is probably pancreatic cancer.  ERCP and stenting with BX/brushings done but will follow up as outpatient with Oncology and GI for BX results and further plans. Not sure if she is going to be a candidate for chemotherapy or surgery but she should follow up with Oncology as outpatient to see. She is quite chronically ill and in declining physical health for some time. She is ambulating well enough though that she is not a candidate for a rehab/SNF. CCP discussed with family them taking additional care for her as well as apparently they have been looking into assisted living type facilities for some time and will continue to pursue that. Giving her as needed agents for itching and nausea at discharge as well. She is still of course jaundiced and will  continue to be.          DIAGNOSTICS    Collected Updated Procedure Result Status    08/30/2023 0549 08/30/2023 0709 Comprehensive Metabolic Panel [[id number]]    (Abnormal)   Blood    Final result Component Value Units   Glucose 102 High  mg/dL   BUN 9 mg/dL   Creatinine 0.75 mg/dL   Sodium 137 mmol/L   Potassium 3.7 mmol/L   Chloride 103 mmol/L   CO2 24.0 mmol/L   Calcium 8.8 mg/dL   Total Protein 5.0 Low  g/dL   Albumin 2.9 Low  g/dL   ALT (SGPT) 99 High  U/L   AST (SGOT) 115 High  U/L   Alkaline Phosphatase 206 High  U/L   Total Bilirubin 25.9 High  mg/dL   Globulin 2.1 gm/dL   A/G Ratio 1.4 g/dL   BUN/Creatinine Ratio 12.0    Anion Gap 10.0 mmol/L   eGFR 80.6 mL/min/1.73          08/30/2023 0549 08/30/2023 0804 CBC Auto Differential [[id number]]   (Abnormal)   Blood    Final result Component Value Units   WBC 4.81 10*3/mm3   RBC 3.71 Low  10*6/mm3   Hemoglobin 11.1 Low  g/dL   Hematocrit 30.6 Low  %   MCV 82.5 fL   MCH 29.9 pg   MCHC 36.3 High  g/dL   RDW 20.6 High  %   RDW-SD 61.2 High  fl   MPV 10.6 fL   Platelets 331 10*3/mm3   Neutrophil % 62.4 %   Lymphocyte % 18.7 Low  %   Monocyte % 15.4 High  %   Eosinophil % 2.5 %   Basophil % 0.8 %   Immature Grans % 0.2 %   Neutrophils, Absolute 3.00 10*3/mm3   Lymphocytes, Absolute 0.90 10*3/mm3   Monocytes, Absolute 0.74 10*3/mm3   Eosinophils, Absolute 0.12 10*3/mm3   Basophils, Absolute 0.04 10*3/mm3         US Pelvis Transvaginal Non OB [[id number]] Dino as Reviewed   Order Status: Completed Collected: 08/30/23 1620    Updated: 08/30/23 1627   Narrative:     Examination: Pelvic sonogram-transabdominal and transvaginal     Pelvic Doppler     TECHNIQUE: Gray scale, color Doppler, and spectral Doppler images of the  pelvis were obtained from the transabdominal and transvaginal approaches     HISTORY: Left adnexal mass     COMPARISON: CT of 8/29/2023     FINDINGS: The uterus measures approximately 6 x 2.5 x 3.5 cm. The  endometrial stripe is thickened with internal  cystic spaces, measuring  1.2 cm. A left adnexal cyst measures 4.8 x 4.6 x 5.3 cm. There are  peripheral nodular components. Ovarian tissue is not seen separate from  this lesion, and the right ovary is not seen. No free pelvic fluid is  seen.      Impression:     1. Left adnexal cyst with suspicious features. Consider MRI if  clinically indicated     2. Endometrial thickening. Differential considerations include tamoxifen  therapy, endometrial hyperplasia, and less likely endometrial cancer.  Recommend attention on MRI with biopsy if clinically indicated.     This report was finalized on 8/30/2023 4:23 PM by Dr. Aneesh Eisenberg M.D.       FL ercp biliary duct only [[id number]] Dino as Reviewed   Order Status: Completed Collected: 08/30/23 0836    Updated: 08/30/23 0840   Narrative:     FL ERCP BILIARY DUCT ONLY-     INDICATIONS: ERCP with common biliary duct stent placement     TECHNIQUE: FLUOROSCOPIC ASSISTANCE IN THE OPERATING ROOM.     FINDINGS:     6 intraoperative fluoroscopic spot views were obtained and recorded the  PACS for review, revealing ERCP with common biliary duct stent  placement. Please see operative report for full details.     Fluoroscopy time: 2 minutes, 41 seconds     Radiation exposure: Dose area product: 1076 uGy x m(2)         Impression:        As described.     This report was finalized on 8/30/2023 8:37 AM by Dr. Seth Han M.D.       CT Chest With Contrast Diagnostic [[id number]] Dino as Reviewed   Order Status: Completed Collected: 08/29/23 1234    Updated: 08/29/23 1317   Narrative:     CT CHEST, ABDOMEN AND PELVIS WITH CONTRAST     HISTORY: 78-year-old female with suspected pancreatic malignancy.     TECHNIQUE: Axial CT images of the chest abdomen and pelvis were obtained  following administration of intravenous and oral contrast. Coronal and  sagittal reconstructions were then obtained.     COMPARISON: CT abdomen and pelvis without contrast from 8/26/2023.     FINDINGS:     CT  CHEST: Small hypoattenuating left thyroid lobe nodule is seen.     There are subcentimeter bilateral axillary and pectoral lymph nodes  without pathological enlargement. Index lymph node on image 17 measures  up to 9 mm in short axis dimension. Small mediastinal lymph nodes  measuring up to 8 mm in short axis dimension. There is a large sliding  hiatal hernia with majority of the stomach being intrathoracic. The  central airways are patent without endobronchial lesion. There is a  sub-5 mm nodule within the left lower lobe on image 53. There is a sub-5  mm subpleural right middle lobe nodule on image 63. There is mild  chronic interstitial change within the lung fields. Mild degenerative  changes within the bones.     . CT ABDOMEN AND PELVIS: There is bilobar intrahepatic biliary  dilatation with a dilated common bile duct which demonstrates an abrupt  cut off in the region of the pancreatic head. The pancreatic malignancy  seen within the posterior pancreatic head is likely better demonstrated  on the most recent MRI. No pancreatic ductal dilatation is seen. No  suspicious pancreatic masses are seen. The celiac and the superior  mesenteric artery appear to be free of tumor. The splenoportal  confluence is patent. The spleen is unremarkable. Bilateral adrenal  glands are normal. No renal calculi or hydronephrosis. The gallbladder  is distended with circumferential wall thickening and enhancement. No  evidence of bowel obstruction. The urinary bladder is partially  distended and normal. Evaluation of pelvis is limited by hip hardware.  There is suggestion of endometrial thickening measuring up to 9 mm which  should be further evaluated pelvic sonogram. There is also a  hypoattenuating lesion measuring up to 5.8 x 4 cm within the left pelvis  favored to represent an ovarian/adnexal cyst. This can be evaluated on  the sonogram as well and is concerning for a neoplasm. There is no  pathological retroperitoneal  "lymphadenopathy. Moderate calcified  atherotic plaque within the abdominal aorta and its branches.  Degenerative disc disease within the spine.      Impression:     1. Primary pancreatic neoplasm within the posterior pancreatic head is  better demonstrated on the most recent MRI. This is causing common bile  duct obstruction. The gallbladder is distended with wall thickening and  enhancement. There is no definite evidence of vascular invasion on CT.  Sub-5 mm pulmonary nodules that are indeterminate and attention on  followup is recommended.  2. Moderate to large sized hiatal hernia.  3. There is suggestion of endometrial thickening as well as a cystic  lesion within the left adnexa measuring up to 5.8 cm. Followup with  pelvic sonogram is recommended.     Radiation dose reduction techniques were utilized, including automated  exposure control and exposure modulation based on body size.        PHYSICAL EXAM  Objective:  Vital signs: (most recent): Blood pressure 161/83, pulse 78, temperature 98.8 °F (37.1 °C), temperature source Oral, resp. rate 18, height 165.1 cm (65\"), weight 59 kg (130 lb), SpO2 100 %.              Jaundiced  +scleral icterus  Chronically ill     CONDITION ON DISCHARGE  Stable.      DISCHARGE DISPOSITION   Home or Self Care      DISCHARGE MEDICATIONS       Your medication list        Your medications have changed   START taking:  cetirizine (zyrTEC)   Start taking on: September 1, 2023  hydrOXYzine (ATARAX)   lactulose (CHRONULAC)   ondansetron ODT (ZOFRAN-ODT)    STOP taking:  amLODIPine-valsartan 5-160 MG per tablet (EXFORGE)   amphetamine-dextroamphetamine 10 MG tablet (ADDERALL)   aspirin 81 MG EC tablet   ezetimibe 10 MG tablet (Zetia)   HYDROcodone-acetaminophen 7.5-325 MG per tablet (NORCO)   meloxicam 15 MG tablet (MOBIC)   methylphenidate 10 MG tablet (RITALIN)   metoprolol succinate  MG 24 hr tablet (TOPROL-XL)   zolpidem 10 MG tablet (AMBIEN)        Future Appointments   Date " Time Provider Department Center   12/1/2023  8:45 AM Barb Acosta APRN [facility]     Additional Instructions for the Follow-ups that You Need to Schedule       Ambulatory Referral to Home Health (Hospital)   As directed      Ct Alford APRN    Order Comments: Ct Alford APRN    Face to Face Visit Date: 8/31/2023   Follow-up provider for Plan of Care?: I treated the patient in an acute care facility and will not continue treatment after discharge.   Follow-up provider: CT ALFORD [[id number]]   Reason/Clinical Findings: debility, pancreatitic mass   Describe mobility limitations that make leaving home difficult: debility, pancreatitic mass   Nursing/Therapeutic Services Requested: Occupational Therapy Skilled Nursing   Skilled nursing orders: Medication education Mental health   Occupational orders: Activities of daily living   Frequency: 1 Week 1        Discharge Follow-up with Specialty: PCP in 1 week, GI in 1-2 weeks, Oncology in 1-2 weeks   As directed      Specialty: PCP in 1 week, GI in 1-2 weeks, Oncology in 1-2 weeks               Contact information for follow-up providers       Ct Alford APRN .    Specialty: Family Medicine  Contact information:  [address]  [facility]  [facility] [address]  [phone]               Barb Acosta APRN. Go on 12/1/2023.    Specialties: Gastroenterology, Nurse Practitioner  Why: Appointment time is 8:45 am.    Please arrive 15 minutes prior to appointment and bring photo ID, insurance cards, and co-pament with you to your visit.  Contact information:  [address]  [facility] [id number] [phone]                       Contact information for after-discharge care       Home Medical Care       [facility] Trisha MITCHELL .    Service: Home Health Services  Contact information:  [address]  [facility] [address] [phone]                                   TEST  RESULTS PENDING AT  DISCHARGE  Pending Labs       Order Current Status    Non-gynecologic Cytology In process               Van Palmer MD  [facility]  08/31/23  11:48 EDT      Time: greater than 32 minutes on discharge  It was a pleasure taking care of this patient while in the hospital.

## 2023-08-31 NOTE — PLAN OF CARE
Goal Outcome Evaluation:Patient complaint of itching all over and she refused her heart monitor bec. The monitor sticker / electrodes makes her inching.  Got an order of benadryl po as 1 time dose given.For DC today with son.

## 2023-08-31 NOTE — NURSING NOTE
Patient was discharged in stable condition to home with home health. Left with friend. IV removed. Vitals stable. AVS provided with next steps for pt.

## 2023-08-31 NOTE — PROGRESS NOTES
Gastroenterology   Inpatient Progress Note    Reason for Follow Up: Obstructive jaundice    Subjective  Interval History:   Denies abdominal pain or nausea.  Tolerating diet.  Bowel movement overnight without evidence of melena or hematochezia.  Remains jaundiced.    Current Facility-Administered Medications:     sennosides-docusate (PERICOLACE) 8.6-50 MG per tablet 2 tablet, 2 tablet, Oral, BID, 2 tablet at 08/27/23 2059 **AND** polyethylene glycol (MIRALAX) packet 17 g, 17 g, Oral, Daily PRN **AND** bisacodyl (DULCOLAX) EC tablet 5 mg, 5 mg, Oral, Daily PRN **AND** bisacodyl (DULCOLAX) suppository 10 mg, 10 mg, Rectal, Daily PRN, Catarino Mg MD    Calcium Replacement - Follow Nurse / BPA Driven Protocol, , Does not apply, PRN, Catarino Mg MD    cetirizine (zyrTEC) tablet 10 mg, 10 mg, Oral, Daily, Van Palmer MD    hydrOXYzine (ATARAX) tablet 25 mg, 25 mg, Oral, TID PRN, Catarino Mg MD, 25 mg at 08/31/23 0902    lactated ringers infusion, 100 mL/hr, Intravenous, Continuous, Catarino Mg MD, Last Rate: 100 mL/hr at 08/31/23 0346, 100 mL/hr at 08/31/23 0346    lactulose (CHRONULAC) 10 GM/15ML solution 10 g, 10 g, Oral, BID, Catarino Mg MD, 10 g at 08/31/23 0855    Magnesium Standard Dose Replacement - Follow Nurse / BPA Driven Protocol, , Does not apply, PRN, Catarino Mg MD    metoprolol succinate XL (TOPROL-XL) 24 hr tablet 25 mg, 25 mg, Oral, Q24H, Catarino Mg MD, 25 mg at 08/31/23 0855    nitroglycerin (NITROSTAT) SL tablet 0.4 mg, 0.4 mg, Sublingual, Q5 Min PRN, Catarino Mg MD    ondansetron (ZOFRAN) injection 4 mg, 4 mg, Intravenous, Q6H PRN, Catarino Mg MD    Phosphorus Replacement - Follow Nurse / BPA Driven Protocol, , Does not apply, Saurav CHRISTIANSON Mitchell C., MD    Potassium Replacement - Follow Nurse / BPA Driven Protocol, , Does not apply, Saurav CHRISTIANSON Mitchell C., MD    [COMPLETED] Insert Peripheral IV, , , Once  **AND** sodium chloride 0.9 % flush 10 mL, 10 mL, Intravenous, PRN, Catarino Mg MD    sodium chloride 0.9 % flush 10 mL, 10 mL, Intravenous, Q12H, Catarino Mg MD, 10 mL at 08/31/23 0856    sodium chloride 0.9 % flush 10 mL, 10 mL, Intravenous, PRN, Catarino Mg MD    sodium chloride 0.9 % infusion 40 mL, 40 mL, Intravenous, PRN, Catarino Mg MD    zolpidem (AMBIEN) tablet 2.5 mg, 2.5 mg, Oral, Nightly PRN, Catarino Mg MD, 2.5 mg at 08/30/23 7977    Facility-Administered Medications Ordered in Other Encounters:     Chlorhexidine Gluconate 2 % pads 2 each, 2 pad , Apply externally, BID, Krish Ramirez MD  Review of Systems:               All systems were reviewed and negative except for:  Constitution:  positive for See HPI  Gastrointestinal: positive for  See HPI    Objective     Vital Signs  Temp:  [97.7 °F (36.5 °C)-98.8 °F (37.1 °C)] 98.8 °F (37.1 °C)  Heart Rate:  [66-91] 78  Resp:  [18] 18  BP: (109-161)/(54-83) 161/83  Body mass index is 21.63 kg/m².                  General Appearance:  awake, alert, oriented, in no acute distress  Skin:  positives: jaundice  Abdomen:  Soft, non-tender, normal bowel sounds; no bruits, organomegaly or masses.                Results Review:                I reviewed the patient's new clinical results.    Results from last 7 days   Lab Units 08/30/23  0549 08/29/23  0448 08/28/23  0458   WBC 10*3/mm3 4.81 5.89 6.04   HEMOGLOBIN g/dL 11.1* 11.6* 11.4*   HEMATOCRIT % 30.6* 32.4* 31.9*   PLATELETS 10*3/mm3 331 316 301     Results from last 7 days   Lab Units 08/30/23  0549 08/29/23  1449 08/29/23 0448 08/28/23 0458   SODIUM mmol/L 137  --  136 134*   POTASSIUM mmol/L 3.7 4.2 3.5 4.2   CHLORIDE mmol/L 103  --  103 104   CO2 mmol/L 24.0  --  21.0* 18.2*   BUN mg/dL 9  --  13 18   CREATININE mg/dL 0.75  --  0.70 0.78   CALCIUM mg/dL 8.8  --  8.6 8.4*   BILIRUBIN mg/dL 25.9*  --  27.7* 28.8*   ALK PHOS U/L 206*  --  201* 196*   ALT (SGPT) U/L  99*  --  101* 112*   AST (SGOT) U/L 115*  --  115* 130*   GLUCOSE mg/dL 102*  --  94 116*     Results from last 7 days   Lab Units 08/29/23  0814 08/28/23  0458 08/27/23  0540   INR  1.27* 2.73* 2.01*     Lab Results   Lab Value Date/Time    LIPASE 108 (H) 08/28/2023 0458       Radiology:  US Pelvis Transvaginal Non OB   Final Result   1. Left adnexal cyst with suspicious features. Consider MRI if   clinically indicated       2. Endometrial thickening. Differential considerations include tamoxifen   therapy, endometrial hyperplasia, and less likely endometrial cancer.   Recommend attention on MRI with biopsy if clinically indicated.        This report was finalized on 8/30/2023 4:23 PM by Dr. Aneesh Eisenberg M.D.          [place] ercp biliary duct only   Final Result       As described.       This report was finalized on 8/30/2023 8:37 AM by Dr. Seth Han M.D.          CT Chest With Contrast Diagnostic   Final Result   1. Primary pancreatic neoplasm within the posterior pancreatic head is   better demonstrated on the most recent MRI. This is causing common bile   duct obstruction. The gallbladder is distended with wall thickening and   enhancement. There is no definite evidence of vascular invasion on CT.   Sub-5 mm pulmonary nodules that are indeterminate and attention on   followup is recommended.   2. Moderate to large sized hiatal hernia.   3. There is suggestion of endometrial thickening as well as a cystic   lesion within the left adnexa measuring up to 5.8 cm. Followup with   pelvic sonogram is recommended.       Radiation dose reduction techniques were utilized, including automated   exposure control and exposure modulation based on body size.           This report was finalized on 8/29/2023 1:14 PM by Dr. Walter Linda M.D.          CT Abdomen Pelvis With Contrast   Final Result   1. Primary pancreatic neoplasm within the posterior pancreatic head is   better demonstrated on the most recent MRI.  This is causing common bile   duct obstruction. The gallbladder is distended with wall thickening and   enhancement. There is no definite evidence of vascular invasion on CT.   Sub-5 mm pulmonary nodules that are indeterminate and attention on   followup is recommended.   2. Moderate to large sized hiatal hernia.   3. There is suggestion of endometrial thickening as well as a cystic   lesion within the left adnexa measuring up to 5.8 cm. Followup with   pelvic sonogram is recommended.       Radiation dose reduction techniques were utilized, including automated   exposure control and exposure modulation based on body size.           This report was finalized on 8/29/2023 1:14 PM by Dr. Walter Linda M.D.          CT Head Without Contrast   Final Result         Electronically signed by Andrea Lozano MD on 08-27-23 at 1809      MRI abdomen w wo contrast mrcp   Final Result      CT Abdomen Pelvis Without Contrast   Final Result   1.  Findings of biliary obstruction with severe intra and extrahepatic   bili ductal dilation. The common bile duct becomes abruptly decompressed   as it projects through the pancreatic head. Gallbladder is also   distended. While findings are incompletely characterized, they are most   concerning for underlying pancreatic malignancy and further evaluation   with MRCP versus ERCP is recommended to further characterize.   2.  A few nodular areas of pulmonary opacification of the bilateral lung   bases which given the above findings are nonspecific. At least continued   close interval follow-up is recommended.   3.  Left adnexal cystic lesion measuring up to 5.7 cm, incompletely   evaluated. Further evaluation with pelvic sonogram is recommended to   exclude neoplasm.   4.  Other findings as above.           This report was finalized on 8/26/2023 11:19 PM by Dr. Pierre Garcia M.D.              Assessment & Plan     Active Hospital Problems    Diagnosis     **Biliary obstruction      Pancreatic mass     Elevated liver enzymes     Dyslipidemia     Chronic insomnia     Benign essential hypertension     PAT (obstructive sleep apnea)        Assessment:  8/29 ERCP with sphincterotomy revealed Schatzki's ring in distal esophagus with large hiatal hernia and normal-appearing ampulla.  Cannulation achieved with high-grade obstruction in distal third of CBD.  Dilation completed above the obstruction with evidence of right and left hepatic duct dilatation.  Successful sphincterotomy and brush cytology completed along with placement of 10 mm x 60 mm covered Wallstent.  Elevated CA 19-9=  2,836  Pancreatic mass  Hyperbilirubinemia-improving. 8/30 25.9 with evidence of obstructive jaundice.  Elevated liver enzymes  Dyslipidemia  Unintentional 15 pound weight loss    These problems are new to me    Plan:  Nursing staff to notify GI service on call if any change in clinical status.  Brush cytology obtained during ERCP pending  Per oncologist Dr. Jeffy sauceda bilirubin and liver transaminases will need to significantly improve his before patient is candidate for systemic therapy  Continue to trend liver transaminases and monitor for signs of acute pancreatitis following 8/29 ERCP evaluation.  Will need outpatient follow-up with Dr. Mg or nurse practitioner Barb Acosta.  Communication sent to Barb Acosta to clarify follow-up recommendations.  Addendum will be made to this note when available.    I discussed the patients findings and my recommendations with patient and Barb Acosta/Dr. Mg .    Addendum:    Keep scheduled follow-up with MORIS Reagan with Dr. Mg as scheduled on 12/1/2023 or contact her office sooner for any new or worsening GI concerns.        MORIS Croft  [facility] [address] [address]

## 2023-08-31 NOTE — PROGRESS NOTES
[facility] INPATIENT PROGRESS NOTE    Length of Stay:  4 days    CHIEF COMPLAINT/REASON FOR VISIT:  Pancreas mass    SUBJECTIVE: Still with pruritus that comes and goes. Receiving hydroxyzine and diphenhydramine and given cetirizine this AM. Afebrile. On room air. Feeling nauseated this morning.     ROS:  14 systems reviewed with pertinent positives and negatives in the HPI. Reviewed today.    OBJECTIVE:  Vitals:    08/30/23 1315 08/30/23 1924 08/30/23 2306 08/31/23 0809   BP: 146/69 109/54 131/70 161/83   BP Location: Left arm Left arm Left arm Left arm   Patient Position: Lying Lying Lying Sitting   Pulse: 69 66 91 78   Resp: 18 18 18 18   Temp: 98.8 °F (37.1 °C) 98.6 °F (37 °C) 97.7 °F (36.5 °C) 98.8 °F (37.1 °C)   TempSrc: Oral Oral Oral Oral   SpO2: 99% 99% 99% 100%   Weight:       Height:             PHYSICAL EXAMINATION:   General:  No acute distress, awake, alert and oriented, up in chair, looks better today  Skin: Significantly jaundiced  HEENT:  Normocephalic/atraumatic.  Sclera are icteric  Chest:  Normal respiratory effort  Extremities:  No visible clubbing, cyanosis, or edema  Neuro/psych: Answers questions appropriately       DIAGNOSTIC DATA:  Results Review:     I reviewed the patient's new clinical results.    Results from last 7 days   Lab Units 08/30/23  0549   WBC 10*3/mm3 4.81   HEMOGLOBIN g/dL 11.1*   HEMATOCRIT % 30.6*   PLATELETS 10*3/mm3 331     Lab Results   Component Value Date    NEUTROABS 3.00 08/30/2023     Results from last 7 days   Lab Units 08/30/23  0549   SODIUM mmol/L 137   POTASSIUM mmol/L 3.7   CHLORIDE mmol/L 103   CO2 mmol/L 24.0   BUN mg/dL 9   CREATININE mg/dL 0.75   GLUCOSE mg/dL 102*   CALCIUM mg/dL 8.8     Results from last 7 days   Lab Units 08/29/23  0814 08/28/23  0458 08/27/23  0540 08/26/23  2156   INR  1.27* 2.73* 2.01* 1.72*   APTT seconds  --   --   --  32.3     Results from last 7 days   Lab Units 08/27/23  0540   MAGNESIUM mg/dL 3.4*          IMAGING:    CT Abdomen Pelvis With Contrast (08/29/2023 09:39)  CT Chest With Contrast Diagnostic (08/29/2023 09:39)    US Pelvis Transvaginal Non OB (08/30/2023 16:12)     ASSESSMENT:  This is a 78 y.o. female with:     *Pancreas mass, suspected malignancy, clinical T2, N0 at this point if pancreatic malignancy confirmed  She presented with painless jaundice and confusion.  On 8/26/2023 the bilirubin was 33.2 with an AST of 163 and .    INR 1.72.    CA 19-9 2836.    CT imaging of the abdomen and pelvis on 8/26/2023 showed biliary obstruction with severe intra and extrahepatic ductal dilation.  The common bile duct became abruptly decompressed as it projected through the pancreas head.  Distended gallbladder.  A few nodular areas of pulmonary opacification of both lung bases were nonspecific.  A left adnexal cystic lesion measures 5.7 cm, incompletely evaluated.    MRI of the abdomen on 8/27/2023 showed a hypoenhancing pancreas mass centered within the uncinate process and pancreas head with obstruction of the common bile duct and severe upstream intra and extrahepatic biliary ductal dilation.  The mass measures 2.8 x 1.3 cm.  Gallbladder with a thickened hyperenhancing appearance suggestive of cholecystitis.  Pancreas divisum noted.  A cystic lesion within the left aspect of the pelvis was partially visualized.  Dr. Mg performed an ERCP with sphincterotomy, brushing, and stent placement on 8/29/2023.  High-grade obstruction in the distal third of the common bile duct was noted.  Cytology pending  CT imaging chest abdomen and pelvis on 8/29/2023.  Pancreas mass in the posterior pancreatic head with biliary obstruction.  Distended gallbladder.  Indeterminate tiny pulmonary nodules less than 5 mm.  Cystic left adnexal lesion     *Elevated bilirubin secondary to obstruction  Bilirubin 25.9, from 27.7, from 28.8    *Elevated transaminases likely from biliary obstruction  , from 115, from  130  ALT 99, from 101, from 112    *Cystic left adnexal mass  Pelvic ultrasound on 8/30/23 with endometrial thickening and a 4.8 x 4.6 x 5.3 cm left adnexal cyst with peripheral nodular components.     *Mild normocytic anemia     *Nausea    RECOMMENDATIONS/PLAN:   Follow-up cytology pending from 8/29  Monitor adnexal lesion without further evaluation given the probable pancreas cancer  Bilirubin and labs will need to improve significantly before she will be a candidate for any systemic therapy  If pancreatic malignancy is confirmed, it is extremely doubtful that it 78 years old she would be a candidate for a Whipple procedure even for early stage disease.  A surgeon would need to answer that question definitively.  As she does appear to have localized disease otherwise (aside from the adnexal mass, etiology undetermined), radiation plus or minus chemotherapy could be considered, again assuming malignancy is confirmed with cytology  Start ondansetron for nausea  She states that her son who is supposed to help care for her at home is sick with a fever and won't be able to help her at home right now. Discharge may be delayed.   CBC CMP today and daily while here    We will follow and arrange follow-up in the office and appropriate      Jeffy Sanches MD

## 2023-08-31 NOTE — TELEPHONE ENCOUNTER
Caller is family friend assisting in care of patient. Requesting review of all meds patient needs to take. Reviewed with caller list of meds on AVS (and verified on discharge summary). Caller verbalized understanding.   States is supposed to have HH as well. Epic chart indicated [facility]. Caller transferred to  agency to set up appt time.    Reason for Disposition   Caller has medicine question only, adult not sick, AND triager answers question    Additional Information   Negative: [1] Intentional drug overdose AND [2] suicidal thoughts or ideas   Negative: Drug overdose and triager unable to answer question   Negative: Caller requesting a renewal or refill of a medicine patient is currently taking   Negative: Caller requesting information unrelated to medicine   Negative: Caller requesting information about COVID-19 Vaccine   Negative: Caller requesting information about Emergency Contraception   Negative: Caller requesting information about Combined Birth Control Pills   Negative: Caller requesting information about Progestin Birth Control Pills   Negative: Caller requesting information about Post-Op pain or medicines   Negative: Caller requesting a prescription antibiotic (such as Penicillin) for Strep throat and has a positive culture result   Negative: Caller requesting a prescription anti-viral med (such as Tamiflu) and has influenza (flu) symptoms   Negative: Immunization reaction suspected   Negative: Rash while taking a medicine or within 3 days of stopping it   Negative: [1] Asthma and [2] having symptoms of asthma (cough, wheezing, etc.)   Negative: [1] Symptom of illness (e.g., headache, abdominal pain, earache, vomiting) AND [2] more than mild   Negative: Breastfeeding questions about mother's medicines and diet   Negative: MORE THAN A DOUBLE DOSE of a prescription or over-the-counter (OTC) drug   Negative: [1] DOUBLE DOSE (an extra dose or lesser amount) of prescription drug AND [2] any symptoms  "(e.g., dizziness, nausea, pain, sleepiness)   Negative: [1] DOUBLE DOSE (an extra dose or lesser amount) of over-the-counter (OTC) drug AND [2] any symptoms (e.g., dizziness, nausea, pain, sleepiness)   Negative: Took another person's prescription drug   Negative: [1] DOUBLE DOSE (an extra dose or lesser amount) of prescription drug AND [2] NO symptoms  (Exception: A double dose of antibiotics.)   Negative: Diabetes drug error or overdose (e.g., took wrong type of insulin or took extra dose)   Negative: [1] Prescription not at pharmacy AND [2] was prescribed by PCP recently (Exception: Triager has access to EMR and prescription is recorded there. Go to Home Care and confirm for pharmacy.)   Negative: [1] Pharmacy calling with prescription question AND [2] triager unable to answer question   Negative: [1] Caller has URGENT medicine question about med that PCP or specialist prescribed AND [2] triager unable to answer question   Negative: Medicine patch causing local rash or itching   Negative: [1] Caller has medicine question about med NOT prescribed by PCP AND [2] triager unable to answer question (e.g., compatibility with other med, storage)   Negative: Prescription request for new medicine (not a refill)   Negative: [1] Caller has NON-URGENT medicine question about med that PCP prescribed AND [2] triager unable to answer question   Negative: Caller wants to use a complementary or alternative medicine   Negative: [1] Prescription prescribed recently is not at pharmacy AND [2] triager has access to patient's EMR AND [3] prescription is recorded in the EMR   Negative: [1] DOUBLE DOSE (an extra dose or lesser amount) of over-the-counter (OTC) drug AND [2] NO symptoms   Negative: [1] DOUBLE DOSE (an extra dose or lesser amount) of antibiotic drug AND [2] NO symptoms    Answer Assessment - Initial Assessment Questions  1. NAME of MEDICINE: \"What medicine(s) are you calling about?\"      Request to review all medications " "on discharge list opf meds.  2. QUESTION: \"What is your question?\" (e.g., double dose of medicine, side effect)       Want to make sure what to give  3. PRESCRIBER: \"Who prescribed the medicine?\" Reason: if prescribed by specialist, call should be referred to that group.      Hospitalist  4. SYMPTOMS: \"Do you have any symptoms?\" If Yes, ask: \"What symptoms are you having?\"  \"How bad are the symptoms (e.g., mild, moderate, severe)      N/A  5. PREGNANCY:  \"Is there any chance that you are pregnant?\" \"When was your last menstrual period?\"      N/A    Protocols used: Medication Question Call-ADULT-    "

## 2023-09-01 ENCOUNTER — READMISSION MANAGEMENT (OUTPATIENT)
Dept: CALL CENTER | Facility: HOSPITAL | Age: 78
End: 2023-09-01
Payer: MEDICARE

## 2023-09-01 ENCOUNTER — NURSE TRIAGE (OUTPATIENT)
Dept: CALL CENTER | Facility: HOSPITAL | Age: 78
End: 2023-09-01
Payer: MEDICARE

## 2023-09-01 NOTE — CASE MANAGEMENT/SOCIAL WORK
Case Management Discharge Note      Final Note: [facility]         Selected Continued Care - Discharged on 8/31/2023 Admission date: 8/26/2023 - Discharge disposition: Home or Self Care      Destination    No services have been selected for the patient.                Durable Medical Equipment    No services have been selected for the patient.                Dialysis/Infusion    No services have been selected for the patient.                Home Medical Care Coordination complete.      Service Provider Selected Services Address Phone Fax Patient Preferred    [facility] [id number]  [place] [address] [phone] --              Therapy    No services have been selected for the patient.                Community Resources    No services have been selected for the patient.                Community & DME    No services have been selected for the patient.                    Transportation Services  Private: Car    Final Discharge Disposition Code: 06 - home with home health care

## 2023-09-02 NOTE — TELEPHONE ENCOUNTER
Caller reported mother was discharged from hospital and has atarax, still complaining of a lot of itching, wanting to know if can take benadryl with atarax. Spoke with Ynes in [facility] pharmacy who did not recommend taking the 2 together, advised caller was not recommended, noted on AVS pt was to start taking zyrtec, caller didn't know if pt has taken yet today, instructed to check if has taken yet, if not advised that may help with itching. Informed could not advise on triamcinolone cream, would need to speak to a provider  Reason for Disposition   [1] Caller has NON-URGENT medicine question about med that PCP prescribed AND [2] triager unable to answer question    Additional Information   Negative: [1] Intentional drug overdose AND [2] suicidal thoughts or ideas   Negative: Drug overdose and triager unable to answer question   Negative: Caller requesting a renewal or refill of a medicine patient is currently taking   Negative: Caller requesting information unrelated to medicine   Negative: Caller requesting information about COVID-19 Vaccine   Negative: Caller requesting information about Emergency Contraception   Negative: Caller requesting information about Combined Birth Control Pills   Negative: Caller requesting information about Progestin Birth Control Pills   Negative: Caller requesting information about Post-Op pain or medicines   Negative: Caller requesting a prescription antibiotic (such as Penicillin) for Strep throat and has a positive culture result   Negative: Caller requesting a prescription anti-viral med (such as Tamiflu) and has influenza (flu) symptoms   Negative: Immunization reaction suspected   Negative: Rash while taking a medicine or within 3 days of stopping it   Negative: [1] Asthma and [2] having symptoms of asthma (cough, wheezing, etc.)   Negative: [1] Symptom of illness (e.g., headache, abdominal pain, earache, vomiting) AND [2] more than mild   Negative: Breastfeeding questions about  "mother's medicines and diet   Negative: MORE THAN A DOUBLE DOSE of a prescription or over-the-counter (OTC) drug   Negative: [1] DOUBLE DOSE (an extra dose or lesser amount) of prescription drug AND [2] any symptoms (e.g., dizziness, nausea, pain, sleepiness)   Negative: [1] DOUBLE DOSE (an extra dose or lesser amount) of over-the-counter (OTC) drug AND [2] any symptoms (e.g., dizziness, nausea, pain, sleepiness)   Negative: Took another person's prescription drug   Negative: [1] DOUBLE DOSE (an extra dose or lesser amount) of prescription drug AND [2] NO symptoms  (Exception: A double dose of antibiotics.)   Negative: Diabetes drug error or overdose (e.g., took wrong type of insulin or took extra dose)   Negative: [1] Prescription not at pharmacy AND [2] was prescribed by PCP recently (Exception: Triager has access to EMR and prescription is recorded there. Go to Home Care and confirm for pharmacy.)   Negative: [1] Pharmacy calling with prescription question AND [2] triager unable to answer question   Negative: [1] Caller has URGENT medicine question about med that PCP or specialist prescribed AND [2] triager unable to answer question   Negative: Medicine patch causing local rash or itching   Negative: [1] Caller has medicine question about med NOT prescribed by PCP AND [2] triager unable to answer question (e.g., compatibility with other med, storage)   Negative: Prescription request for new medicine (not a refill)    Answer Assessment - Initial Assessment Questions  1. DESCRIPTION: \"Describe the itching you are having.\"      Son reported pt on atarax but still having a lot of itching, wanting to know if can take anything else with it  2. SEVERITY: \"How bad is it?\"     - MILD: Doesn't interfere with normal activities.    - MODERATE-SEVERE: Interferes with work, school, sleep, or other activities.       Mod-severe  3. SCRATCHING: \"Are there any scratch marks? Bleeding?\"      -  4. ONSET: \"When did this begin?\"      " " Began in hospital  5. CAUSE: \"What do you think is causing the itching?\" (ask about swimming pools, pollen, animals, soaps, etc.)      -  6. OTHER SYMPTOMS: \"Do you have any other symptoms?\"       Did not report any  7. PREGNANCY: \"Is there any chance you are pregnant?\" \"When was your last menstrual period?\"      N/a    Answer Assessment - Initial Assessment Questions  1. NAME of MEDICINE: \"What medicine(s) are you calling about?\"      Benadryl and triamcinolone   2. QUESTION: \"What is your question?\" (e.g., double dose of medicine, side effect)      Wanting to know if can take benadryl with atarax, also asking if can use triamcinolone cream that she had before  3. PRESCRIBER: \"Who prescribed the medicine?\" Reason: if prescribed by specialist, call should be referred to that group.      -  4. SYMPTOMS: \"Do you have any symptoms?\" If Yes, ask: \"What symptoms are you having?\"  \"How bad are the symptoms (e.g., mild, moderate, severe)      itching  5. PREGNANCY:  \"Is there any chance that you are pregnant?\" \"When was your last menstrual period?\"      N/a    Protocols used: Itching - Widespread-ADULT-, Medication Question Call-ADULT-    "

## 2023-09-05 ENCOUNTER — READMISSION MANAGEMENT (OUTPATIENT)
Dept: CALL CENTER | Facility: HOSPITAL | Age: 78
End: 2023-09-05
Payer: MEDICARE

## 2023-09-05 NOTE — OUTREACH NOTE
Medical Week 1 Survey      Flowsheet Row Responses   [facility] patient discharged from? [place]   Does the patient have one of the following disease processes/diagnoses(primary or secondary)? Other   Week 1 attempt successful? No   Unsuccessful attempts Attempt 1            Yani GONZALEZ - Licensed Nurse

## 2023-09-06 ENCOUNTER — NURSE TRIAGE (OUTPATIENT)
Dept: CALL CENTER | Facility: HOSPITAL | Age: 78
End: 2023-09-06
Payer: MEDICARE

## 2023-09-06 NOTE — TELEPHONE ENCOUNTER
"Reason for Disposition   Health Information question, no triage required and triager able to answer question    Additional Information   Negative: [1] Caller is not with the adult (patient) AND [2] reporting urgent symptoms   Negative: Lab result questions   Negative: Medication questions   Negative: Caller can't be reached by phone   Negative: Caller has already spoken to PCP or another triager   Negative: RN needs further essential information from caller in order to complete triage   Negative: Requesting regular office appointment   Negative: [1] Caller requesting NON-URGENT health information AND [2] PCP's office is the best resource    Answer Assessment - Initial Assessment Questions  1. REASON FOR CALL or QUESTION: \"What is your reason for calling today?\" or \"How can I best help you?\" or \"What question do you have that I can help answer?\"      Caller requesting pathology results of his mother's pancreatic biopsy.    Protocols used: Information Only Call - No Triage-ADULT-    "

## 2023-09-06 NOTE — TELEPHONE ENCOUNTER
Results 09/06/2023 Caller asking for results of pathology on pancreatic tissue  from last week.    Caller asking for his mother's pathology results from her pancreatic biopsy last week. Advised to call her PCP and let them know they are anxiously awaiting these results and to please call when they get them. States they have been watching her My Chart and nothing has come across there.

## 2023-09-08 ENCOUNTER — TELEPHONE (OUTPATIENT)
Dept: GASTROENTEROLOGY | Facility: CLINIC | Age: 78
End: 2023-09-08

## 2023-09-08 ENCOUNTER — NURSE TRIAGE (OUTPATIENT)
Dept: CALL CENTER | Facility: HOSPITAL | Age: 78
End: 2023-09-08
Payer: MEDICARE

## 2023-09-08 NOTE — TELEPHONE ENCOUNTER
"Caller, who is son, requesting to speak with someone re: pathology report noted on MyChart.    Reason for Disposition   Nursing judgment    Additional Information   Negative: Nursing judgment   Negative: Nursing judgment   Negative: Nursing judgment   Negative: Information only question and nurse able to answer    Answer Assessment - Initial Assessment Questions  1. REASON FOR CALL or QUESTION: \"What is your reason for calling today?\" or \"How can I best help you?\" or \"What question do you have that I can help answer?\"      Caller requesting results of pathology    Protocols used: No Protocol Available - Information Only-ADULT-[place]    "

## 2023-09-08 NOTE — TELEPHONE ENCOUNTER
Hub staff attempted to follow warm transfer process and was unsuccessful     Caller: YISEL ARAIZA    Relationship to patient: Emergency Contact    Best call back number: [phone]    Patient is needing: PT'S SON WAS CALLING TO FIND OUT THE RESULTS OF ERCP. THEY ARE TRYING TO FIND OUT IF THE RESULTS ARE CANCEROUS. IT'S OK TO [facility].

## 2023-09-09 ENCOUNTER — APPOINTMENT (OUTPATIENT)
Dept: CT IMAGING | Facility: HOSPITAL | Age: 78
DRG: 872 | End: 2023-09-09
Payer: MEDICARE

## 2023-09-09 ENCOUNTER — HOSPITAL ENCOUNTER (INPATIENT)
Facility: HOSPITAL | Age: 78
LOS: 10 days | Discharge: ANOTHER HOSPITAL | DRG: 872 | End: 2023-09-19
Attending: STUDENT IN AN ORGANIZED HEALTH CARE EDUCATION/TRAINING PROGRAM | Admitting: INTERNAL MEDICINE
Payer: MEDICARE

## 2023-09-09 DIAGNOSIS — R19.7 DIARRHEA, UNSPECIFIED TYPE: ICD-10-CM

## 2023-09-09 DIAGNOSIS — R17 JAUNDICE: ICD-10-CM

## 2023-09-09 DIAGNOSIS — R65.20 SEVERE SEPSIS: Primary | ICD-10-CM

## 2023-09-09 DIAGNOSIS — A41.9 SEVERE SEPSIS: Primary | ICD-10-CM

## 2023-09-09 DIAGNOSIS — K86.89 PANCREATIC MASS: ICD-10-CM

## 2023-09-09 DIAGNOSIS — R53.1 GENERALIZED WEAKNESS: ICD-10-CM

## 2023-09-09 LAB
ALBUMIN SERPL-MCNC: 3.1 G/DL (ref 3.5–5.2)
ALBUMIN/GLOB SERPL: 0.6 G/DL
ALP SERPL-CCNC: 185 U/L (ref 39–117)
ALT SERPL W P-5'-P-CCNC: 142 U/L (ref 1–33)
AMMONIA BLD-SCNC: 29 UMOL/L (ref 11–51)
ANION GAP SERPL CALCULATED.3IONS-SCNC: 17.3 MMOL/L (ref 5–15)
APTT PPP: 28.4 SECONDS (ref 22.7–35.4)
AST SERPL-CCNC: 159 U/L (ref 1–32)
BACTERIA UR QL AUTO: ABNORMAL /HPF
BASOPHILS # BLD AUTO: 0.07 10*3/MM3 (ref 0–0.2)
BASOPHILS NFR BLD AUTO: 0.3 % (ref 0–1.5)
BILIRUB SERPL-MCNC: 9 MG/DL (ref 0–1.2)
BILIRUB UR QL STRIP: ABNORMAL
BUN SERPL-MCNC: 27 MG/DL (ref 8–23)
BUN/CREAT SERPL: 28.1 (ref 7–25)
CALCIUM SPEC-SCNC: 10 MG/DL (ref 8.6–10.5)
CHLORIDE SERPL-SCNC: 95 MMOL/L (ref 98–107)
CLARITY UR: ABNORMAL
CO2 SERPL-SCNC: 19.7 MMOL/L (ref 22–29)
COLOR UR: ABNORMAL
CREAT SERPL-MCNC: 0.96 MG/DL (ref 0.57–1)
D-LACTATE SERPL-SCNC: 3.5 MMOL/L (ref 0.5–2)
DEPRECATED RDW RBC AUTO: 57.6 FL (ref 37–54)
EGFRCR SERPLBLD CKD-EPI 2021: 59.9 ML/MIN/1.73
EOSINOPHIL # BLD AUTO: 0.01 10*3/MM3 (ref 0–0.4)
EOSINOPHIL NFR BLD AUTO: 0 % (ref 0.3–6.2)
ERYTHROCYTE [DISTWIDTH] IN BLOOD BY AUTOMATED COUNT: 16.7 % (ref 12.3–15.4)
GLOBULIN UR ELPH-MCNC: 4.9 GM/DL
GLUCOSE SERPL-MCNC: 158 MG/DL (ref 65–99)
GLUCOSE UR STRIP-MCNC: NEGATIVE MG/DL
HCT VFR BLD AUTO: 35 % (ref 34–46.6)
HGB BLD-MCNC: 11.9 G/DL (ref 12–15.9)
HGB UR QL STRIP.AUTO: NEGATIVE
HOLD SPECIMEN: NORMAL
HOLD SPECIMEN: NORMAL
HYALINE CASTS UR QL AUTO: ABNORMAL /LPF
IMM GRANULOCYTES # BLD AUTO: 0.52 10*3/MM3 (ref 0–0.05)
IMM GRANULOCYTES NFR BLD AUTO: 2.2 % (ref 0–0.5)
INR PPP: 1.13 (ref 0.9–1.1)
KETONES UR QL STRIP: NEGATIVE
LEUKOCYTE ESTERASE UR QL STRIP.AUTO: ABNORMAL
LIPASE SERPL-CCNC: 148 U/L (ref 13–60)
LYMPHOCYTES # BLD AUTO: 1.34 10*3/MM3 (ref 0.7–3.1)
LYMPHOCYTES NFR BLD AUTO: 5.7 % (ref 19.6–45.3)
MCH RBC QN AUTO: 31.3 PG (ref 26.6–33)
MCHC RBC AUTO-ENTMCNC: 34 G/DL (ref 31.5–35.7)
MCV RBC AUTO: 92.1 FL (ref 79–97)
MONOCYTES # BLD AUTO: 1.56 10*3/MM3 (ref 0.1–0.9)
MONOCYTES NFR BLD AUTO: 6.6 % (ref 5–12)
NEUTROPHILS NFR BLD AUTO: 20.07 10*3/MM3 (ref 1.7–7)
NEUTROPHILS NFR BLD AUTO: 85.2 % (ref 42.7–76)
NITRITE UR QL STRIP: NEGATIVE
NRBC BLD AUTO-RTO: 0.2 /100 WBC (ref 0–0.2)
PH UR STRIP.AUTO: 5.5 [PH] (ref 5–8)
PLATELET # BLD AUTO: 505 10*3/MM3 (ref 140–450)
PMV BLD AUTO: 9.7 FL (ref 6–12)
POTASSIUM SERPL-SCNC: 4 MMOL/L (ref 3.5–5.2)
PROT SERPL-MCNC: 8 G/DL (ref 6–8.5)
PROT UR QL STRIP: ABNORMAL
PROTHROMBIN TIME: 14.6 SECONDS (ref 11.7–14.2)
RBC # BLD AUTO: 3.8 10*6/MM3 (ref 3.77–5.28)
RBC # UR STRIP: ABNORMAL /HPF
REF LAB TEST METHOD: ABNORMAL
SODIUM SERPL-SCNC: 132 MMOL/L (ref 136–145)
SP GR UR STRIP: 1.02 (ref 1–1.03)
SQUAMOUS #/AREA URNS HPF: ABNORMAL /HPF
UROBILINOGEN UR QL STRIP: ABNORMAL
WBC # UR STRIP: ABNORMAL /HPF
WBC NRBC COR # BLD: 23.57 10*3/MM3 (ref 3.4–10.8)
WHOLE BLOOD HOLD COAG: NORMAL
WHOLE BLOOD HOLD SPECIMEN: NORMAL

## 2023-09-09 PROCEDURE — 83605 ASSAY OF LACTIC ACID: CPT | Performed by: STUDENT IN AN ORGANIZED HEALTH CARE EDUCATION/TRAINING PROGRAM

## 2023-09-09 PROCEDURE — 83690 ASSAY OF LIPASE: CPT | Performed by: STUDENT IN AN ORGANIZED HEALTH CARE EDUCATION/TRAINING PROGRAM

## 2023-09-09 PROCEDURE — 85730 THROMBOPLASTIN TIME PARTIAL: CPT | Performed by: STUDENT IN AN ORGANIZED HEALTH CARE EDUCATION/TRAINING PROGRAM

## 2023-09-09 PROCEDURE — 85025 COMPLETE CBC W/AUTO DIFF WBC: CPT | Performed by: STUDENT IN AN ORGANIZED HEALTH CARE EDUCATION/TRAINING PROGRAM

## 2023-09-09 PROCEDURE — 36415 COLL VENOUS BLD VENIPUNCTURE: CPT

## 2023-09-09 PROCEDURE — 85610 PROTHROMBIN TIME: CPT | Performed by: STUDENT IN AN ORGANIZED HEALTH CARE EDUCATION/TRAINING PROGRAM

## 2023-09-09 PROCEDURE — 87040 BLOOD CULTURE FOR BACTERIA: CPT | Performed by: STUDENT IN AN ORGANIZED HEALTH CARE EDUCATION/TRAINING PROGRAM

## 2023-09-09 PROCEDURE — 80053 COMPREHEN METABOLIC PANEL: CPT | Performed by: STUDENT IN AN ORGANIZED HEALTH CARE EDUCATION/TRAINING PROGRAM

## 2023-09-09 PROCEDURE — 81001 URINALYSIS AUTO W/SCOPE: CPT | Performed by: STUDENT IN AN ORGANIZED HEALTH CARE EDUCATION/TRAINING PROGRAM

## 2023-09-09 PROCEDURE — 25010000002 PIPERACILLIN SOD-TAZOBACTAM PER 1 G: Performed by: STUDENT IN AN ORGANIZED HEALTH CARE EDUCATION/TRAINING PROGRAM

## 2023-09-09 PROCEDURE — 99285 EMERGENCY DEPT VISIT HI MDM: CPT

## 2023-09-09 PROCEDURE — 82140 ASSAY OF AMMONIA: CPT | Performed by: STUDENT IN AN ORGANIZED HEALTH CARE EDUCATION/TRAINING PROGRAM

## 2023-09-09 RX ADMIN — PIPERACILLIN SODIUM AND TAZOBACTAM SODIUM 3.38 G: 3; .375 INJECTION, SOLUTION INTRAVENOUS at 23:32

## 2023-09-10 ENCOUNTER — READMISSION MANAGEMENT (OUTPATIENT)
Dept: CALL CENTER | Facility: HOSPITAL | Age: 78
End: 2023-09-10
Payer: MEDICARE

## 2023-09-10 ENCOUNTER — APPOINTMENT (OUTPATIENT)
Dept: CT IMAGING | Facility: HOSPITAL | Age: 78
DRG: 872 | End: 2023-09-10
Payer: MEDICARE

## 2023-09-10 LAB
ALBUMIN SERPL-MCNC: 2.1 G/DL (ref 3.5–5.2)
ALBUMIN/GLOB SERPL: 0.6 G/DL
ALP SERPL-CCNC: 146 U/L (ref 39–117)
ALT SERPL W P-5'-P-CCNC: 88 U/L (ref 1–33)
ANION GAP SERPL CALCULATED.3IONS-SCNC: 12.1 MMOL/L (ref 5–15)
AST SERPL-CCNC: 100 U/L (ref 1–32)
BILIRUB SERPL-MCNC: 5.6 MG/DL (ref 0–1.2)
BUN SERPL-MCNC: 14 MG/DL (ref 8–23)
BUN/CREAT SERPL: 23.7 (ref 7–25)
CALCIUM SPEC-SCNC: 8.4 MG/DL (ref 8.6–10.5)
CHLORIDE SERPL-SCNC: 99 MMOL/L (ref 98–107)
CO2 SERPL-SCNC: 19.9 MMOL/L (ref 22–29)
CREAT SERPL-MCNC: 0.59 MG/DL (ref 0.57–1)
D-LACTATE SERPL-SCNC: 1.8 MMOL/L (ref 0.5–2)
D-LACTATE SERPL-SCNC: 2.1 MMOL/L (ref 0.5–2)
D-LACTATE SERPL-SCNC: 2.7 MMOL/L (ref 0.5–2)
DEPRECATED RDW RBC AUTO: 51.8 FL (ref 37–54)
EGFRCR SERPLBLD CKD-EPI 2021: 91.2 ML/MIN/1.73
ERYTHROCYTE [DISTWIDTH] IN BLOOD BY AUTOMATED COUNT: 15.1 % (ref 12.3–15.4)
GLOBULIN UR ELPH-MCNC: 3.7 GM/DL
GLUCOSE SERPL-MCNC: 120 MG/DL (ref 65–99)
HCT VFR BLD AUTO: 25.8 % (ref 34–46.6)
HGB BLD-MCNC: 8.7 G/DL (ref 12–15.9)
LYMPHOCYTES # BLD MANUAL: 0.61 10*3/MM3 (ref 0.7–3.1)
LYMPHOCYTES NFR BLD MANUAL: 6.2 % (ref 5–12)
MCH RBC QN AUTO: 31.4 PG (ref 26.6–33)
MCHC RBC AUTO-ENTMCNC: 33.7 G/DL (ref 31.5–35.7)
MCV RBC AUTO: 93.1 FL (ref 79–97)
METAMYELOCYTES NFR BLD MANUAL: 1 % (ref 0–0)
MONOCYTES # BLD: 1.21 10*3/MM3 (ref 0.1–0.9)
MYELOCYTES NFR BLD MANUAL: 3.1 % (ref 0–0)
NEUTROPHILS # BLD AUTO: 16.96 10*3/MM3 (ref 1.7–7)
NEUTROPHILS NFR BLD MANUAL: 86.6 % (ref 42.7–76)
NRBC BLD AUTO-RTO: 0.1 /100 WBC (ref 0–0.2)
PLAT MORPH BLD: NORMAL
PLATELET # BLD AUTO: 373 10*3/MM3 (ref 140–450)
PMV BLD AUTO: 9.6 FL (ref 6–12)
POLYCHROMASIA BLD QL SMEAR: ABNORMAL
POTASSIUM SERPL-SCNC: 3.9 MMOL/L (ref 3.5–5.2)
PROT SERPL-MCNC: 5.8 G/DL (ref 6–8.5)
RBC # BLD AUTO: 2.77 10*6/MM3 (ref 3.77–5.28)
SODIUM SERPL-SCNC: 131 MMOL/L (ref 136–145)
VARIANT LYMPHS NFR BLD MANUAL: 3.1 % (ref 19.6–45.3)
WBC MORPH BLD: NORMAL
WBC NRBC COR # BLD: 19.58 10*3/MM3 (ref 3.4–10.8)

## 2023-09-10 PROCEDURE — 85025 COMPLETE CBC W/AUTO DIFF WBC: CPT | Performed by: INTERNAL MEDICINE

## 2023-09-10 PROCEDURE — 83605 ASSAY OF LACTIC ACID: CPT | Performed by: STUDENT IN AN ORGANIZED HEALTH CARE EDUCATION/TRAINING PROGRAM

## 2023-09-10 PROCEDURE — 25010000002 PIPERACILLIN SOD-TAZOBACTAM PER 1 G: Performed by: NURSE PRACTITIONER

## 2023-09-10 PROCEDURE — 85007 BL SMEAR W/DIFF WBC COUNT: CPT | Performed by: INTERNAL MEDICINE

## 2023-09-10 PROCEDURE — 25510000001 IOPAMIDOL 61 % SOLUTION: Performed by: INTERNAL MEDICINE

## 2023-09-10 PROCEDURE — 99221 1ST HOSP IP/OBS SF/LOW 40: CPT | Performed by: STUDENT IN AN ORGANIZED HEALTH CARE EDUCATION/TRAINING PROGRAM

## 2023-09-10 PROCEDURE — 74177 CT ABD & PELVIS W/CONTRAST: CPT

## 2023-09-10 PROCEDURE — 99221 1ST HOSP IP/OBS SF/LOW 40: CPT | Performed by: INTERNAL MEDICINE

## 2023-09-10 PROCEDURE — 80053 COMPREHEN METABOLIC PANEL: CPT | Performed by: INTERNAL MEDICINE

## 2023-09-10 RX ORDER — BISACODYL 5 MG/1
5 TABLET, DELAYED RELEASE ORAL DAILY PRN
Status: DISCONTINUED | OUTPATIENT
Start: 2023-09-10 | End: 2023-09-10

## 2023-09-10 RX ORDER — AMOXICILLIN 250 MG
2 CAPSULE ORAL 2 TIMES DAILY
Status: DISCONTINUED | OUTPATIENT
Start: 2023-09-10 | End: 2023-09-10

## 2023-09-10 RX ORDER — ONDANSETRON 4 MG/1
4 TABLET, FILM COATED ORAL EVERY 6 HOURS PRN
Status: DISCONTINUED | OUTPATIENT
Start: 2023-09-10 | End: 2023-09-19 | Stop reason: HOSPADM

## 2023-09-10 RX ORDER — NITROGLYCERIN 0.4 MG/1
0.4 TABLET SUBLINGUAL
Status: DISCONTINUED | OUTPATIENT
Start: 2023-09-10 | End: 2023-09-19 | Stop reason: HOSPADM

## 2023-09-10 RX ORDER — LACTULOSE 10 G/15ML
10 SOLUTION ORAL 2 TIMES DAILY PRN
Status: DISCONTINUED | OUTPATIENT
Start: 2023-09-10 | End: 2023-09-19 | Stop reason: HOSPADM

## 2023-09-10 RX ORDER — UREA 10 %
3 LOTION (ML) TOPICAL NIGHTLY PRN
Status: DISCONTINUED | OUTPATIENT
Start: 2023-09-10 | End: 2023-09-19 | Stop reason: HOSPADM

## 2023-09-10 RX ORDER — POLYETHYLENE GLYCOL 3350 17 G/17G
17 POWDER, FOR SOLUTION ORAL DAILY PRN
Status: DISCONTINUED | OUTPATIENT
Start: 2023-09-10 | End: 2023-09-10

## 2023-09-10 RX ORDER — ONDANSETRON 2 MG/ML
4 INJECTION INTRAMUSCULAR; INTRAVENOUS EVERY 6 HOURS PRN
Status: DISCONTINUED | OUTPATIENT
Start: 2023-09-10 | End: 2023-09-19 | Stop reason: HOSPADM

## 2023-09-10 RX ORDER — ALUMINA, MAGNESIA, AND SIMETHICONE 2400; 2400; 240 MG/30ML; MG/30ML; MG/30ML
15 SUSPENSION ORAL EVERY 6 HOURS PRN
Status: DISCONTINUED | OUTPATIENT
Start: 2023-09-10 | End: 2023-09-19 | Stop reason: HOSPADM

## 2023-09-10 RX ORDER — CETIRIZINE HYDROCHLORIDE 10 MG/1
10 TABLET ORAL DAILY
Status: DISCONTINUED | OUTPATIENT
Start: 2023-09-10 | End: 2023-09-19 | Stop reason: HOSPADM

## 2023-09-10 RX ORDER — BISACODYL 10 MG
10 SUPPOSITORY, RECTAL RECTAL DAILY PRN
Status: DISCONTINUED | OUTPATIENT
Start: 2023-09-10 | End: 2023-09-10

## 2023-09-10 RX ORDER — SODIUM CHLORIDE 9 MG/ML
125 INJECTION, SOLUTION INTRAVENOUS CONTINUOUS
Status: DISCONTINUED | OUTPATIENT
Start: 2023-09-10 | End: 2023-09-12

## 2023-09-10 RX ORDER — DESVENLAFAXINE SUCCINATE 50 MG/1
50 TABLET, EXTENDED RELEASE ORAL DAILY
Status: DISCONTINUED | OUTPATIENT
Start: 2023-09-10 | End: 2023-09-19 | Stop reason: HOSPADM

## 2023-09-10 RX ORDER — SODIUM CHLORIDE 0.9 % (FLUSH) 0.9 %
10 SYRINGE (ML) INJECTION AS NEEDED
Status: DISCONTINUED | OUTPATIENT
Start: 2023-09-10 | End: 2023-09-19 | Stop reason: HOSPADM

## 2023-09-10 RX ORDER — HYDROXYZINE HYDROCHLORIDE 25 MG/1
25 TABLET, FILM COATED ORAL 3 TIMES DAILY PRN
Status: DISCONTINUED | OUTPATIENT
Start: 2023-09-10 | End: 2023-09-19 | Stop reason: HOSPADM

## 2023-09-10 RX ADMIN — PIPERACILLIN SODIUM AND TAZOBACTAM SODIUM 3.38 G: 3; .375 INJECTION, SOLUTION INTRAVENOUS at 15:47

## 2023-09-10 RX ADMIN — PIPERACILLIN SODIUM AND TAZOBACTAM SODIUM 3.38 G: 3; .375 INJECTION, SOLUTION INTRAVENOUS at 06:42

## 2023-09-10 RX ADMIN — SODIUM CHLORIDE 75 ML/HR: 9 INJECTION, SOLUTION INTRAVENOUS at 13:09

## 2023-09-10 RX ADMIN — IOPAMIDOL 85 ML: 612 INJECTION, SOLUTION INTRAVENOUS at 00:02

## 2023-09-10 RX ADMIN — SODIUM CHLORIDE, POTASSIUM CHLORIDE, SODIUM LACTATE AND CALCIUM CHLORIDE 1000 ML: 600; 310; 30; 20 INJECTION, SOLUTION INTRAVENOUS at 00:10

## 2023-09-10 RX ADMIN — DESVENLAFAXINE SUCCINATE 50 MG: 50 TABLET, EXTENDED RELEASE ORAL at 20:18

## 2023-09-10 RX ADMIN — PIPERACILLIN SODIUM AND TAZOBACTAM SODIUM 3.38 G: 3; .375 INJECTION, SOLUTION INTRAVENOUS at 23:32

## 2023-09-10 RX ADMIN — CETIRIZINE HYDROCHLORIDE 10 MG: 10 TABLET ORAL at 17:00

## 2023-09-10 NOTE — PLAN OF CARE
Goal Outcome Evaluation:  Plan of Care Reviewed With: patient        Progress: no change  Outcome Evaluation: Pt admitted this shift for severe sepsis. Pt A&O, assist stand by to bathroom. Pt on RA. Need stool sample. Surgery consult placed. VSS. Will continue to monitor.

## 2023-09-10 NOTE — ED PROVIDER NOTES
EMERGENCY DEPARTMENT ENCOUNTER    Room Number:  S611/1  PCP: Ct Alford APRN  History obtained from: Patient, neighbors      HPI:  Chief Complaint: Jaundice  A complete HPI/ROS/PMH/PSH/SH/FH are unobtainable due to: Not applicable  Context: Lizzie Bose is a 78 y.o. female who presents to the ED c/o jaundice.  Recently discharged from the hospital after diagnosis of presumptive pancreatic cancer.  Had a biliary stent placed for obstructive jaundice.  Since getting home has had difficulties managing her activities and has been having frequent diarrhea.  Patient denies any other complaints.  Neighbors report they are concerned about her because she appears to not be doing well and appears to be increasingly jaundiced.  Patient denies fevers.            PAST MEDICAL HISTORY  Active Ambulatory Problems     Diagnosis Date Noted    PAT (obstructive sleep apnea)     Lumbar pain 03/21/2019    Primary osteoarthritis of left knee 02/11/2020    Primary osteoarthritis of right knee 02/24/2020    Benign essential hypertension 09/04/2020    Mixed hyperlipidemia 09/04/2020    Vitamin D deficiency 09/04/2020    Age-related osteoporosis without current pathological fracture 09/10/2020    Hyperinsulinism 09/10/2020    Chronic insomnia 09/10/2020    Attention deficit hyperactivity disorder (ADHD), combined type 09/10/2020    Dyspnea on exertion 09/15/2020    Dyslipidemia 03/25/2022    Biliary obstruction 08/27/2023    Elevated liver enzymes 08/27/2023    Pancreatic mass 08/30/2023     Resolved Ambulatory Problems     Diagnosis Date Noted    No Resolved Ambulatory Problems     Past Medical History:   Diagnosis Date    ADD (attention deficit disorder)     Arthritis     Depression     High blood pressure     History of Clostridioides difficile infection     Hyperlipidemia     Low back pain     Right knee pain     Sleep apnea          PAST SURGICAL HISTORY  Past Surgical History:   Procedure Laterality Date    APPENDECTOMY       CARPAL TUNNEL RELEASE Right     CATARACT EXTRACTION Bilateral     ERCP WITH STENT PLACEMENT N/A 8/29/2023    Procedure: ENDOSCOPIC RETROGRADE CHOLANGIOPANCREATOGRAPHY WITH SPHINCTEROTOMY; BRUSHING AND STENT PLACEMENT;  Surgeon: Catarino Mg MD;  Location: [facility] ENDOSCOPY;  Service: Gastroenterology;  Laterality: N/A;  pre-biliary obstruction  post-    HAMMER TOE REPAIR Left     HIP ARTHROPLASTY Left     TOTAL KNEE ARTHROPLASTY Right 7/13/2020    Procedure: TOTAL KNEE ARTHROPLASTY;  Surgeon: Krish Ramirez MD;  Location: [facility] MAIN OR;  Service: Orthopedics;  Laterality: Right;         FAMILY HISTORY  Family History   Problem Relation Age of Onset    Diabetes Brother     Hypertension Brother     Clotting disorder Son     [place] Hyperthermia Neg Hx          SOCIAL HISTORY  Social History     Socioeconomic History    Marital status:    Tobacco Use    Smoking status: Never    Smokeless tobacco: Never   Substance and Sexual Activity    Alcohol use: No    Drug use: Never    Sexual activity: Defer         ALLERGIES  Atorvastatin        REVIEW OF SYSTEMS    As per HPI      PHYSICAL EXAM  ED Triage Vitals   Temp Heart Rate Resp BP SpO2   09/09/23 2131 09/09/23 2131 09/09/23 2137 09/09/23 2147 09/09/23 2131   99.6 °F (37.6 °C) (!) 143 20 (!) 152/104 98 %      Temp src Heart Rate Source Patient Position BP Location FiO2 (%)   -- -- -- -- --              Physical Exam  Constitutional:       General: She is not in acute distress.     Appearance: She is ill-appearing.   HENT:      Head: Normocephalic and atraumatic.   Eyes:      General: Scleral icterus present.   Cardiovascular:      Rate and Rhythm: Regular rhythm. Tachycardia present.   Pulmonary:      Effort: Pulmonary effort is normal. No respiratory distress.   Abdominal:      General: There is no distension.      Palpations: Abdomen is soft.      Tenderness: There is abdominal tenderness.      Comments: Tenderness to palpation of the right upper quadrant  In order to meet Medicare requirements, the clinical documentation must support the information cited in the admission order.  Please be sure to provide detailed and clear documentation about the following in the admitting note/history and physical:   Musculoskeletal:         General: No swelling or deformity.   Skin:     General: Skin is warm and dry.      Coloration: Skin is jaundiced.   Neurological:      Mental Status: She is alert. Mental status is at baseline.         Vital signs and nursing notes reviewed.          LAB RESULTS  Recent Results (from the past 24 hour(s))   Comprehensive Metabolic Panel    Collection Time: 09/09/23  9:56 PM    Specimen: Blood   Result Value Ref Range    Glucose 158 (H) 65 - 99 mg/dL    BUN 27 (H) 8 - 23 mg/dL    Creatinine 0.96 0.57 - 1.00 mg/dL    Sodium 132 (L) 136 - 145 mmol/L    Potassium 4.0 3.5 - 5.2 mmol/L    Chloride 95 (L) 98 - 107 mmol/L    CO2 19.7 (L) 22.0 - 29.0 mmol/L    Calcium 10.0 8.6 - 10.5 mg/dL    Total Protein 8.0 6.0 - 8.5 g/dL    Albumin 3.1 (L) 3.5 - 5.2 g/dL    ALT (SGPT) 142 (H) 1 - 33 U/L    AST (SGOT) 159 (H) 1 - 32 U/L    Alkaline Phosphatase 185 (H) 39 - 117 U/L    Total Bilirubin 9.0 (H) 0.0 - 1.2 mg/dL    Globulin 4.9 gm/dL    A/G Ratio 0.6 g/dL    BUN/Creatinine Ratio 28.1 (H) 7.0 - 25.0    Anion Gap 17.3 (H) 5.0 - 15.0 mmol/L    eGFR 59.9 (L) >60.0 mL/min/1.73   Lipase    Collection Time: 09/09/23  9:56 PM    Specimen: Blood   Result Value Ref Range    Lipase 148 (H) 13 - 60 U/L   Protime-INR    Collection Time: 09/09/23  9:56 PM    Specimen: Blood   Result Value Ref Range    Protime 14.6 (H) 11.7 - 14.2 Seconds    INR 1.13 (H) 0.90 - 1.10   aPTT    Collection Time: 09/09/23  9:56 PM    Specimen: Blood   Result Value Ref Range    PTT 28.4 22.7 - 35.4 seconds   Lactic Acid, Plasma    Collection Time: 09/09/23  9:56 PM    Specimen: Blood   Result Value Ref Range    Lactate 3.5 (C) 0.5 - 2.0 mmol/L   Ammonia    Collection Time: 09/09/23  9:56 PM    Specimen: Blood   Result Value Ref Range    Ammonia 29 11 - 51 umol/L   CBC Auto Differential    Collection Time: 09/09/23  9:56 PM    Specimen: Blood   Result Value Ref Range    WBC 23.57 (H) 3.40 - 10.80 10*3/mm3    RBC 3.80 3.77 - 5.28 10*6/mm3     Hemoglobin 11.9 (L) 12.0 - 15.9 g/dL    Hematocrit 35.0 34.0 - 46.6 %    MCV 92.1 79.0 - 97.0 fL    MCH 31.3 26.6 - 33.0 pg    MCHC 34.0 31.5 - 35.7 g/dL    RDW 16.7 (H) 12.3 - 15.4 %    RDW-SD 57.6 (H) 37.0 - 54.0 fl    MPV 9.7 6.0 - 12.0 fL    Platelets 505 (H) 140 - 450 10*3/mm3    Neutrophil % 85.2 (H) 42.7 - 76.0 %    Lymphocyte % 5.7 (L) 19.6 - 45.3 %    Monocyte % 6.6 5.0 - 12.0 %    Eosinophil % 0.0 (L) 0.3 - 6.2 %    Basophil % 0.3 0.0 - 1.5 %    Immature Grans % 2.2 (H) 0.0 - 0.5 %    Neutrophils, Absolute 20.07 (H) 1.70 - 7.00 10*3/mm3    Lymphocytes, Absolute 1.34 0.70 - 3.10 10*3/mm3    Monocytes, Absolute 1.56 (H) 0.10 - 0.90 10*3/mm3    Eosinophils, Absolute 0.01 0.00 - 0.40 10*3/mm3    Basophils, Absolute 0.07 0.00 - 0.20 10*3/mm3    Immature Grans, Absolute 0.52 (H) 0.00 - 0.05 10*3/mm3    nRBC 0.2 0.0 - 0.2 /100 WBC   Green Top (Gel)    Collection Time: 09/09/23  9:56 PM   Result Value Ref Range    Extra Tube Hold for add-ons.    Lavender Top    Collection Time: 09/09/23  9:56 PM   Result Value Ref Range    Extra Tube hold for add-on    Gold Top - SST    Collection Time: 09/09/23  9:56 PM   Result Value Ref Range    Extra Tube Hold for add-ons.    Light Blue Top    Collection Time: 09/09/23  9:56 PM   Result Value Ref Range    Extra Tube Hold for add-ons.    Urinalysis With Culture If Indicated - Urine, Clean Catch    Collection Time: 09/09/23 10:22 PM    Specimen: Urine, Clean Catch   Result Value Ref Range    Color, UA Dark Yellow (A) Yellow, Straw    Appearance, UA Cloudy (A) Clear    pH, UA 5.5 5.0 - 8.0    Specific Gravity, UA 1.023 1.005 - 1.030    Glucose, UA Negative Negative    Ketones, UA Negative Negative    Bilirubin, UA Large (3+) (A) Negative    Blood, UA Negative Negative    Protein, UA 30 mg/dL (1+) (A) Negative    Leuk Esterase, UA Small (1+) (A) Negative    Nitrite, UA Negative Negative    Urobilinogen, UA 2.0 E.U./dL (A) 0.2 - 1.0 E.U./dL   Urinalysis, Microscopic Only -  Urine, Clean Catch    Collection Time: 09/09/23 10:22 PM    Specimen: Urine, Clean Catch   Result Value Ref Range    RBC, UA 0-2 None Seen, 0-2 /HPF    WBC, UA 3-5 (A) None Seen, 0-2 /HPF    Bacteria, UA 4+ (A) None Seen /HPF    Squamous Epithelial Cells, UA 0-2 None Seen, 0-2 /HPF    Hyaline Casts, UA Too Numerous to Count None Seen /LPF    Methodology Manual Light Microscopy        Ordered the above labs and reviewed the results.        RADIOLOGY  CT Abdomen Pelvis With Contrast    Result Date: 9/10/2023  CT ABDOMEN PELVIS W CONTRAST-  Radiation dose reduction techniques were utilized, including automated exposure control and exposure modulation based on body size.  Clinical: Ascending cholangitis  COMPARISON 8/29/2023  FINDINGS: 1. There has been interval placement of the biliary stent with resolution of the biliary duct dilatation. There is pneumobilia now demonstrated. There is some fluid demonstrated within the distal portion of the stent. There is gallbladder hydrops, diameter is 6 cm. There is significant gallbladder wall thickening and irregularity with pericholecystic fluid and surrounding phlegmon consistent with acute cholecystitis. No gallstone is demonstrated.  2. Periesophageal hiatal hernia similar to the previous examination.  3. Pancreatic duct diameter upper limits of normal, no pancreatic mass seen. The spleen, adrenal glands and kidneys are satisfactory in appearance.  4. Stable left ovarian/adnexal cyst. Uterus and right adnexa unremarkable. Urinary bladder is satisfactory in appearance.  5. Caliber of the large and small bowel is within normal limits.  CONCLUSION: Interval placement of a biliary stent with resolution of the biliary duct dilatation. There is however considerable gallbladder wall thickening with gallbladder hydrops, pericholecystic fluid and phlegmon, consistent with cholecystitis. No gallstone seen.     This report was finalized on 9/10/2023 12:15 AM by Dr. Hugh Moffett M.D.        Ordered the above noted radiological studies. Reviewed by me in PACS.        Critical Care  Performed by: Jameel Justin MD  Authorized by: Jae Robison MD     Critical care provider statement:     Critical care time (minutes):  76    Critical care time was exclusive of:  Separately billable procedures and treating other patients and teaching time    Critical care was necessary to treat or prevent imminent or life-threatening deterioration of the following conditions:  Sepsis, metabolic crisis and dehydration    Critical care was time spent personally by me on the following activities:  Blood draw for specimens, development of treatment plan with patient or surrogate, discussions with consultants, evaluation of patient's response to treatment, examination of patient, vascular access procedures, review of old charts, re-evaluation of patient's condition, pulse oximetry, ordering and review of radiographic studies, ordering and review of laboratory studies, ordering and performing treatments and interventions and obtaining history from patient or surrogate    Care discussed with: admitting provider            MEDICATIONS GIVEN IN ER  Medications   sodium chloride 0.9 % flush 10 mL (has no administration in time range)   sennosides-docusate (PERICOLACE) 8.6-50 MG per tablet 2 tablet (has no administration in time range)     And   polyethylene glycol (MIRALAX) packet 17 g (has no administration in time range)     And   bisacodyl (DULCOLAX) EC tablet 5 mg (has no administration in time range)     And   bisacodyl (DULCOLAX) suppository 10 mg (has no administration in time range)   nitroglycerin (NITROSTAT) SL tablet 0.4 mg (has no administration in time range)   ondansetron (ZOFRAN) tablet 4 mg (has no administration in time range)     Or   ondansetron (ZOFRAN) injection 4 mg (has no administration in time range)   melatonin tablet 3 mg (has no administration in time range)   aluminum-magnesium  hydroxide-simethicone (MAALOX MAX) 400-400-40 MG/5ML suspension 15 mL (has no administration in time range)   Pharmacy to Dose Zosyn (has no administration in time range)   piperacillin-tazobactam (ZOSYN) 3.375 g in iso-osmotic dextrose 50 ml (premix) (0 g Intravenous Stopped 9/10/23 0002)   lactated ringers bolus 1,000 mL (1,000 mL Intravenous New Bag 9/10/23 0010)   iopamidol (ISOVUE-300) 61 % injection 100 mL (85 mL Intravenous Given 9/10/23 0002)               MEDICAL DECISION MAKING, PROGRESS, and CONSULTS    MDM: Patient presented the emergency department with failure to thrive, jaundice.  Ill-appearing on arrival to the ER, tachycardic, borderline temperature.  Labs significant for worsening leukocytosis.  Also elevated lactic acid.  Concern for severe sepsis, suspected intra-abdominal source.  Given 1 L of crystalloid.  Improved at reevaluation after fluids.  Started broad-spectrum antibiotics with Zosyn.  Imaging ordered for further evaluation, demonstrates worsening inflammatory change of the gallbladder with hydrops.  Surgery consult made, pending.  Complex case due to recently diagnosed malignancy and biliary stent.  Bilirubin improved from prior admission despite significant jaundice on exam.  Discussed with inpatient team, will admit for further management of severe sepsis and other metabolic abnormalities.    All labs have been independently reviewed by me.  All radiology studies have been reviewed by me and I have also reviewed the radiology report.   EKG's independently viewed and interpreted by me.  Discussion below represents my analysis of pertinent findings related to patient's condition, differential diagnosis, treatment plan and final disposition.      Additional sources:  - Discussed/ obtained information from independent historians: Obtained additional information from neighbors, from report it sounds like patient has been minimizing her symptoms and difficulties at home.    - External  (non-ED) record review: Reviewed recent admission and plan for oncology follow-up.    - Chronic or social conditions impacting care: Social conditions of living alone impact this patient's care.    - Shared decision making: Discussed plan for admission and possibly palliative care consultation, patient open to this and would like to explore all options.      Orders placed during this visit:  Orders Placed This Encounter   Procedures    Critical Care    Blood Culture - Blood,    Blood Culture - Blood,    Clostridioides difficile Toxin - Stool, Per Rectum    Clostridioides difficile Toxin, PCR - Stool, Per Rectum    CT Abdomen Pelvis With Contrast    Comprehensive Metabolic Panel    Lipase    Protime-INR    aPTT    [place] Draw    Lactic Acid, Plasma    Ammonia    Urinalysis With Culture If Indicated - Urine, Clean Catch    CBC Auto Differential    STAT Lactic Acid, Reflex    Urinalysis, Microscopic Only - Urine, Clean Catch    Diet: Cardiac Diets; Healthy Heart (2-3 Na+); Texture: Regular Texture (IDDSI 7); Fluid Consistency: Thin (IDDSI 0)    Vital Signs    Intake & Output    Oral Care    Place Sequential Compression Device    Maintain Sequential Compression Device    Telemetry - Maintain IV Access    Telemetry - Place Orders & Notify Provider of Results When Patient Experiences Acute Chest Pain, Dysrhythmia or Respiratory Distress    May Be Off Telemetry for Tests    Up With Assistance    Code Status and Medical Interventions:    LHA (on-call MD unless specified) Details    Surgery (on-call MD unless specified)    Patient Isolation Contact Spore    Incentive Spirometry    Inpatient Admission    CBC & Differential    Green Top (Gel)    Lavender Top    Gold Top - SST    Light Blue Top         Additional orders considered but not ordered:  Considered CT PE due to persistent tachycardia however will defer given no pulmonary complaints or hypoxia.        Differential diagnosis includes but is not limited  to:    Ascending cholangitis, obstructed stent, cholecystitis, pancreatitis, sepsis      Independent interpretation of labs, radiology studies, and discussions with consultants:  ED Course as of 09/10/23 0115   Sun Sep 10, 2023   0037 CT abdomen pelvis interpreted by myself:  Significant inflammatory change around the gallbladder appears worsened from prior CT from August 29. [FS]      ED Course User Index  [FS] Jameel Justin MD           DIAGNOSIS  Final diagnoses:   Severe sepsis   Jaundice   Generalized weakness   Diarrhea, unspecified type         DISPOSITION  Admitted to telemetry bed.        Latest Documented Vital Signs:  As of 01:15 EDT  BP- 159/97 HR- 92 Temp- 99.6 °F (37.6 °C) O2 sat- 98%              --    Please note that portions of this were completed with a voice recognition program.       Note Disclaimer: At [facility], we believe that sharing information builds trust and better relationships. You are receiving this note because you are receiving care at [facility] or recently visited. It is possible you will see health information before a provider has talked with you about it. This kind of information can be easy to misunderstand. To help you fully understand what it means for your health, we urge you to discuss this note with your provider.             Jameel Justin MD  09/10/23 0115

## 2023-09-10 NOTE — ED NOTES
"Nursing report ED to floor  Lizzie Bose  78 y.o.  female    HPI :   Chief Complaint   Patient presents with    Abdominal Pain    Jaundice       Admitting doctor:   Jae Robison MD    Admitting diagnosis:   The primary encounter diagnosis was Severe sepsis. Diagnoses of Jaundice, Generalized weakness, and Diarrhea, unspecified type were also pertinent to this visit.    Code status:   Current Code Status       Date Active Code Status Order ID Comments User Context       Prior            Allergies:   Atorvastatin    Isolation:   Contact Spore    Intake and Output  No intake or output data in the 24 hours ending 09/10/23 0007    Weight:       09/09/23 2200   Weight: 60.7 kg (133 lb 13.1 oz)       Most recent vitals:   Vitals:    09/09/23 2147 09/09/23 2200 09/09/23 2214 09/09/23 2310   BP: (!) 152/104   143/90   Pulse: (!) 132  105 107   Resp:       Temp:       SpO2: 98%  98% 97%   Weight:  60.7 kg (133 lb 13.1 oz)     Height:  165.1 cm (65\")         Active LDAs/IV Access:   Lines, Drains & Airways       Active LDAs       Name Placement date Placement time Site Days    Peripheral IV 09/09/23 2243 Anterior;Right Forearm 09/09/23 2243  Forearm  less than 1                    Labs (abnormal labs have a star):   Labs Reviewed   COMPREHENSIVE METABOLIC PANEL - Abnormal; Notable for the following components:       Result Value    Glucose 158 (*)     BUN 27 (*)     Sodium 132 (*)     Chloride 95 (*)     CO2 19.7 (*)     Albumin 3.1 (*)     ALT (SGPT) 142 (*)     AST (SGOT) 159 (*)     Alkaline Phosphatase 185 (*)     Total Bilirubin 9.0 (*)     BUN/Creatinine Ratio 28.1 (*)     Anion Gap 17.3 (*)     eGFR 59.9 (*)     All other components within normal limits    Narrative:     GFR Normal >60  Chronic Kidney Disease <60  Kidney Failure <15    The GFR formula is only valid for adults with stable renal function between ages 18 and 70.   LIPASE - Abnormal; Notable for the following components:    Lipase 148 (*)  "    All other components within normal limits   PROTIME-INR - Abnormal; Notable for the following components:    Protime 14.6 (*)     INR 1.13 (*)     All other components within normal limits   LACTIC ACID, PLASMA - Abnormal; Notable for the following components:    Lactate 3.5 (*)     All other components within normal limits   URINALYSIS W/ CULTURE IF INDICATED - Abnormal; Notable for the following components:    Color, UA Dark Yellow (*)     Appearance, UA Cloudy (*)     Bilirubin, UA Large (3+) (*)     Protein, UA 30 mg/dL (1+) (*)     Leuk Esterase, UA Small (1+) (*)     Urobilinogen, UA 2.0 E.U./dL (*)     All other components within normal limits    Narrative:     In absence of clinical symptoms, the presence of pyuria, bacteria, and/or nitrites on the urinalysis result does not correlate with infection.   CBC WITH AUTO DIFFERENTIAL - Abnormal; Notable for the following components:    WBC 23.57 (*)     Hemoglobin 11.9 (*)     RDW 16.7 (*)     RDW-SD 57.6 (*)     Platelets 505 (*)     Neutrophil % 85.2 (*)     Lymphocyte % 5.7 (*)     Eosinophil % 0.0 (*)     Immature Grans % 2.2 (*)     Neutrophils, Absolute 20.07 (*)     Monocytes, Absolute 1.56 (*)     Immature Grans, Absolute 0.52 (*)     All other components within normal limits   URINALYSIS, MICROSCOPIC ONLY - Abnormal; Notable for the following components:    WBC, UA 3-5 (*)     Bacteria, UA 4+ (*)     All other components within normal limits   APTT - Normal   AMMONIA - Normal   BLOOD CULTURE   BLOOD CULTURE   CLOSTRIDIOIDES DIFFICILE TOXIN    Narrative:     The following orders were created for panel order Clostridioides difficile Toxin - Stool, Per Rectum.  Procedure                               Abnormality         Status                     ---------                               -----------         ------                     Clostridioides difficile...[[id number]]                                                   Please view results for these tests  on the individual orders.   CLOSTRIDIOIDES DIFFICILE TOXIN, PCR   RAINBOW DRAW    Narrative:     The following orders were created for panel order [place] Draw.  Procedure                               Abnormality         Status                     ---------                               -----------         ------                     Green Top (Gel)[[id number]]                                  Final result               Lavender Top[[id number]]                                     Final result               Gold Top - SST[[id number]]                                   Final result               Light Blue Top[[id number]]                                   Final result                 Please view results for these tests on the individual orders.   LACTIC ACID, REFLEX   CBC AND DIFFERENTIAL    Narrative:     The following orders were created for panel order CBC & Differential.  Procedure                               Abnormality         Status                     ---------                               -----------         ------                     CBC Auto Differential[[id number]]        Abnormal            Final result                 Please view results for these tests on the individual orders.   GREEN TOP   LAVENDER TOP   GOLD TOP - SST   LIGHT BLUE TOP       EKG:   No orders to display       Meds given in ED:   Medications   lactated ringers bolus 1,000 mL (has no administration in time range)   piperacillin-tazobactam (ZOSYN) 3.375 g in iso-osmotic dextrose 50 ml (premix) (3.375 g Intravenous New Bag 9/9/23 2332)   iopamidol (ISOVUE-300) 61 % injection 100 mL (85 mL Intravenous Given 9/10/23 0002)       Imaging results:  No radiology results for the last day    Ambulatory status:   - assist x1    Social issues:   Social History     Socioeconomic History    Marital status:    Tobacco Use    Smoking status: Never    Smokeless tobacco: Never   Substance and Sexual Activity    Alcohol use: No    Drug use: Never     Sexual activity: Defer       NIH Stroke Scale:       Ju Chowdary RN  09/10/23 00:07 EDT

## 2023-09-10 NOTE — PLAN OF CARE
Goal Outcome Evaluation:  Plan of Care Reviewed With: patient        Progress: no change  Outcome Evaluation: Patient is alert and oriented. On room air. Vital signs are stable. Stand by assist with activity. Patient on IV fluids. Patient on IV antibiotic. Gastroenterology consulted and seen patient. Surgery consulted.

## 2023-09-10 NOTE — PROGRESS NOTES
[facility] Clinical Pharmacy Services: Piperacillin-Tazobactam Consult    Pt Name: Lizzie Bose   : 1945    Indication: Intra-Abdominal Infection    Relevant clinical data and objective history reviewed:    Past Medical History:   Diagnosis Date    ADD (attention deficit disorder)     Arthritis     Depression     High blood pressure     History of Clostridioides difficile infection     Hyperlipidemia     Low back pain     Right knee pain     Sleep apnea     NO USING MACHINE     Creatinine   Date Value Ref Range Status   2023 0.96 0.57 - 1.00 mg/dL Final   2023 0.71 0.57 - 1.00 mg/dL Final   2023 0.75 0.57 - 1.00 mg/dL Final   2021 0.7 0.7 - 1.5 mg/dL Final     BUN   Date Value Ref Range Status   2023 27 (H) 8 - 23 mg/dL Final   2021 13 7 - 20 mg/dL Final     Estimated Creatinine Clearance: 44.8 mL/min (by C-G formula based on SCr of 0.96 mg/dL).    Lab Results   Component Value Date    WBC 23.57 (H) 2023     Temp Readings from Last 3 Encounters:   23 99.6 °F (37.6 °C)   23 98.8 °F (37.1 °C) (Oral)   20 97 °F (36.1 °C) (Temporal)      Assessment/Plan  Estimated CrCl >20 mL/min at this time; BMI 22.27 kg/m2  Will start piperacillin-tazobactam 3.375 g IV every 8 hours     Pharmacy will continue to follow daily while on piperacillin-tazobactam and adjust as needed. Thank you for this consult.    Bethany Babinski, PharmD  Clinical Pharmacist

## 2023-09-10 NOTE — CONSULTS
"Nutrition Services    Patient Name:  Lizzie Bose  YOB: 1945  MRN: [id number]  Admit Date:  9/9/2023    Assessment Date:  09/10/23    Summary: Nutrition consult from nursing admission screen. Pt admitted with Severe sepsis, pancreatic mass. She is NPO at present. Wt index reviewed. CT abd notes as well. GI and surgical consults pending. Labs, meds, skin reviewed, Will follow up with pt when POC has been established to address nutrition needs.    CLINICAL NUTRITION ASSESSMENT      Reason for Assessment Nurse Admission Screen     Diagnosis/Problem   Severe sepsis, pancreatic mass   Medical/Surgical History Past Medical History:   Diagnosis Date    ADD (attention deficit disorder)     Arthritis     Depression     High blood pressure     History of Clostridioides difficile infection     Hyperlipidemia     Low back pain     Right knee pain     Sleep apnea     NO USING MACHINE       Past Surgical History:   Procedure Laterality Date    APPENDECTOMY      CARPAL TUNNEL RELEASE Right     CATARACT EXTRACTION Bilateral     ERCP WITH STENT PLACEMENT N/A 8/29/2023    Procedure: ENDOSCOPIC RETROGRADE CHOLANGIOPANCREATOGRAPHY WITH SPHINCTEROTOMY; BRUSHING AND STENT PLACEMENT;  Surgeon: Catarino Mg MD;  Location: [facility] ENDOSCOPY;  Service: Gastroenterology;  Laterality: N/A;  pre-biliary obstruction  post-    HAMMER TOE REPAIR Left     HIP ARTHROPLASTY Left     TOTAL KNEE ARTHROPLASTY Right 7/13/2020    Procedure: TOTAL KNEE ARTHROPLASTY;  Surgeon: Krish Ramirez MD;  Location: [facility] OR;  Service: Orthopedics;  Laterality: Right;        Anthropometrics        Current Height  Current Weight  BMI kg/m2 Height: 165.1 cm (65\")  Weight: 56.9 kg (125 lb 8 oz) (09/10/23 0308)  Body mass index is 20.88 kg/m².   Adjusted BMI (if applicable)    BMI Category Normal/Healthy (18.4 - 24.9)   Ideal Body Weight (IBW) 57kg   Usual Body Weight (UBW) 145lb   Weight Trend Loss   Weight History Wt Readings from Last 15 " Encounters:   09/10/23 0308 56.9 kg (125 lb 8 oz)   09/09/23 2200 60.7 kg (133 lb 13.1 oz)   08/27/23 0112 59 kg (130 lb)   09/15/20 1440 66.2 kg (146 lb)   07/30/20 1358 63.5 kg (140 lb)   07/13/20 1640 64.2 kg (141 lb 8.6 oz)   07/13/20 1039 64.2 kg (141 lb 7 oz)   07/10/20 1625 66.2 kg (146 lb)   07/09/20 1455 66.2 kg (146 lb)   05/29/20 1154 63.5 kg (140 lb)   03/12/20 1838 65.8 kg (145 lb)   02/19/20 1418 65.8 kg (145 lb)   01/16/20 1502 67.9 kg (149 lb 12.8 oz)   09/27/16 1511 61.7 kg (136 lb)      --  Labs       Pertinent Labs    Results from last 7 days   Lab Units 09/09/23  2156   SODIUM mmol/L 132*   POTASSIUM mmol/L 4.0   CHLORIDE mmol/L 95*   CO2 mmol/L 19.7*   BUN mg/dL 27*   CREATININE mg/dL 0.96   CALCIUM mg/dL 10.0   BILIRUBIN mg/dL 9.0*   ALK PHOS U/L 185*   ALT (SGPT) U/L 142*   AST (SGOT) U/L 159*   GLUCOSE mg/dL 158*     Results from last 7 days   Lab Units 09/09/23  2156   HEMOGLOBIN g/dL 11.9*   HEMATOCRIT % 35.0   WBC 10*3/mm3 23.57*   ALBUMIN g/dL 3.1*     Results from last 7 days   Lab Units 09/09/23  2156   INR  1.13*   APTT seconds 28.4   PLATELETS 10*3/mm3 505*     COVID19   Date Value Ref Range Status   07/10/2020 Not Detected Not Detected - Ref. Range Final     Lab Results   Component Value Date    HGBA1C 6.0 (H) 11/03/2021          Medications           Scheduled Medications piperacillin-tazobactam, 3.375 g, Intravenous, Q8H       Infusions sodium chloride, 75 mL/hr, Last Rate: 75 mL/hr (09/10/23 1309)       PRN Medications   aluminum-magnesium hydroxide-simethicone    melatonin    nitroglycerin    ondansetron **OR** ondansetron    sodium chloride     Physical Findings          General Findings alert, oriented   Oral/Mouth Cavity WNL   Edema  not assessed   Gastrointestinal diarrhea, hypoactive bowel sounds, last bowel movement: 9/10   Skin  skin intact, jaundice   Tubes/Drains/Lines none   NFPE Pending, due to: time constraints   --  Current Nutrition Orders & Evaluation of Intake        Oral Nutrition     Food Allergies NKFA   Current PO Diet NPO Diet NPO Type: Sips with Meds, Ice Chips   Supplement n/a   PO Evaluation     % PO Intake 50% bfast    Factors Affecting Intake: decreased appetite, diarrhea   --  PES STATEMENT / NUTRITION DIAGNOSIS      Nutrition Dx Problem  Problem: Inadequate Oral Intake  Etiology: Medical Diagnosis - pancreatic mass    Signs/Symptoms: Report of Minimal PO Intake and Unintended Weight Change   --  NUTRITION INTERVENTION / PLAN OF CARE      Intervention Goal(s) Maintain nutrition status, Meet estimated needs, Disease management/therapy, Initiate feeding/diet, Tolerate PO , and Maintain weight         RD Intervention/Action Await initiation of PO diet, Continue to monitor, and Care plan reviewed         Prescription/Orders:       PO Diet       Supplements       Enteral Nutrition       Parenteral Nutrition    New Prescription Ordered? No changes at this time   --      Monitor/Evaluation Per protocol   Discharge Plan/Needs Pending clinical course   --    RD to follow per protocol.      Electronically signed by:  Mallory Higgins RD  09/10/23 14:22 EDT

## 2023-09-10 NOTE — CONSULTS
[facility]  Initial Inpatient Consult Note    Referring Provider: [facility]    Reason for Consultation: Elevated bilirubin status post ERCP with stent placement    Subjective     History of present illness:    78 y.o. female who underwent ERCP with sphincterotomy and Wallstent placement by Dr. gM on August 29.  She does have a history of possible pancreatic neoplasm with obstructive jaundice.  Cytology brushings were not definitive but suspicious for malignancy.  CT scan reveals placement of the biliary stent with resolution of biliary duct dilation however there is some gallbladder wall thickening and gallbladder hydrops as well as pericholecystic fluid and phlegmon consistent with cholecystitis.  The pancreatic duct was defined as within the upper limits of normal.  Labs reflect an improvement of her bilirubin which was 22.1  10 days ago and is currently 9.0.  She does have some transaminase elevations that may be consistent with her gallbladder issues.  Also references been made to possible sepsis with positive lactic acidosis and leukocytosis with a white count of 23.5.    Past Medical History:  Past Medical History:   Diagnosis Date    ADD (attention deficit disorder)     Arthritis     Depression     High blood pressure     History of Clostridioides difficile infection     Hyperlipidemia     Low back pain     Right knee pain     Sleep apnea     NO USING MACHINE     Past Surgical History:  Past Surgical History:   Procedure Laterality Date    APPENDECTOMY      CARPAL TUNNEL RELEASE Right     CATARACT EXTRACTION Bilateral     ERCP WITH STENT PLACEMENT N/A 8/29/2023    Procedure: ENDOSCOPIC RETROGRADE CHOLANGIOPANCREATOGRAPHY WITH SPHINCTEROTOMY; BRUSHING AND STENT PLACEMENT;  Surgeon: Catarino Mg MD;  Location: [facility] ENDOSCOPY;  Service: Gastroenterology;  Laterality: N/A;  pre-biliary obstruction  post-    HAMMER TOE REPAIR Left     HIP ARTHROPLASTY Left     TOTAL KNEE  ARTHROPLASTY Right 7/13/2020    Procedure: TOTAL KNEE ARTHROPLASTY;  Surgeon: Krish Ramirez MD;  Location: [facility] OR;  Service: Orthopedics;  Laterality: Right;      Social History:   Social History     Tobacco Use    Smoking status: Never    Smokeless tobacco: Never   Substance Use Topics    Alcohol use: No      Family History:  Family History   Problem Relation Age of Onset    Diabetes Brother     Hypertension Brother     Clotting disorder Son     [place] Hyperthermia Neg Hx        Home Meds:  Medications Prior to Admission   Medication Sig Dispense Refill Last Dose    cetirizine (zyrTEC) 10 MG tablet Take 1 tablet by mouth Daily. 30 tablet 0     cholecalciferol (VITAMIN D3) 25 MCG (1000 UT) tablet Take 1 tablet by mouth Daily.       hydrOXYzine (ATARAX) 25 MG tablet Take 1 tablet by mouth 3 (Three) Times a Day As Needed for Itching. 30 tablet 0     lactulose (CHRONULAC) 10 GM/15ML solution Take 15 mL by mouth 2 (Two) Times a Day As Needed (constipation). 473 mL 0     PRISTIQ 50 MG 24 hr tablet Take 1 tablet by mouth Daily.        Current Meds:   cetirizine, 10 mg, Oral, Daily  desvenlafaxine, 50 mg, Oral, Daily  piperacillin-tazobactam, 3.375 g, Intravenous, Q8H      Allergies:  Allergies   Allergen Reactions    Atorvastatin      Review of Systems  Pertinent items are noted in HPI     Objective     Vital Signs  Temp:  [97.5 °F (36.4 °C)-99.6 °F (37.6 °C)] 97.5 °F (36.4 °C)  Heart Rate:  [] 82  Resp:  [16-20] 19  BP: (108-159)/() 116/70  Physical Exam:  General Appearance:    Alert, cooperative, in no acute distress   Head:    Normocephalic, without obvious abnormality, atraumatic   Eyes:          conjunctivae and sclerae normal, no   icterus   Throat:   no thrush, oral mucosa moist   Neck:   Supple, no adenopathy   Lungs:     Clear to auscultation bilaterally    Heart:    Regular rhythm and normal rate    Chest Wall:    No abnormalities observed   Abdomen:     Soft, nondistended, nontender; normal  bowel sounds   Extremities:   no edema, no redness   Skin:   No bruising or rash   Psychiatric:  normal mood and insight     Results Review:   I reviewed the patient's new clinical results.    Results from last 7 days   Lab Units 09/09/23 2156   WBC 10*3/mm3 23.57*   HEMOGLOBIN g/dL 11.9*   HEMATOCRIT % 35.0   PLATELETS 10*3/mm3 505*     Results from last 7 days   Lab Units 09/09/23 2156   SODIUM mmol/L 132*   POTASSIUM mmol/L 4.0   CHLORIDE mmol/L 95*   CO2 mmol/L 19.7*   BUN mg/dL 27*   CREATININE mg/dL 0.96   CALCIUM mg/dL 10.0   BILIRUBIN mg/dL 9.0*   ALK PHOS U/L 185*   ALT (SGPT) U/L 142*   AST (SGOT) U/L 159*   GLUCOSE mg/dL 158*     Results from last 7 days   Lab Units 09/09/23 2156   INR  1.13*     Lab Results   Lab Value Date/Time    LIPASE 148 (H) 09/09/2023 2156    LIPASE 108 (H) 08/28/2023 0458       Radiology:  CT Abdomen Pelvis With Contrast   Final Result          Assessment & Plan   Active Hospital Problems    Diagnosis     **Severe sepsis     Pancreatic mass     Mixed hyperlipidemia     Benign essential hypertension        Assessment:  Elevated bilirubin with history of sphincterotomy and Wallstent placement: Actually no current evidence of ductal dilation and consistent with good decompression.  Elevated transaminase levels  Possible pancreatic neoplasm      Plan:  Can monitor LFTs for the present, see no reason for GI intervention at this time with ductal decompression achieved.  We will follow for now.      I discussed the patients findings and my recommendations with patient and nursing staff.    Abe Gregory MD

## 2023-09-10 NOTE — H&P
Patient Name:  Lizzie Bose  YOB: 1945  MRN:  [id number]  Admit Date:  9/9/2023  Patient Care Team:  Ct Alford APRN as PCP - General (Family Medicine)  Christiana Mccoy MD as Consulting Physician (Cardiology)      Subjective   History Present Illness     Chief Complaint   Patient presents with    Abdominal Pain    Jaundice       Ms. Bose is a 78 y.o. with a history of obstructive jaundice, pancreatic cancer, recent biliary stent placement who presents to [facility] complaining of diarrhea and weakness.  She has had continued jaundice.  She is not reporting any abdominal pain nausea or vomiting currently.  No fevers or chills reported.      Review of Systems   Constitutional:  Negative for chills and fever.   HENT:  Negative for sore throat and trouble swallowing.    Eyes:  Negative for pain and visual disturbance.   Respiratory:  Negative for cough and shortness of breath.    Cardiovascular:  Negative for chest pain and palpitations.   Gastrointestinal:  Positive for diarrhea. Negative for abdominal pain, nausea and vomiting.   Endocrine: Negative for cold intolerance and heat intolerance.   Genitourinary:  Negative for difficulty urinating and dysuria.   Musculoskeletal:  Negative for neck pain and neck stiffness.   Skin:  Negative for pallor and rash.   Allergic/Immunologic: Negative for environmental allergies and food allergies.   Neurological:  Negative for seizures and syncope.   Hematological:  Negative for adenopathy. Does not bruise/bleed easily.   Psychiatric/Behavioral:  Negative for agitation and confusion.       Personal History     Past Medical History:   Diagnosis Date    ADD (attention deficit disorder)     Arthritis     Depression     High blood pressure     History of Clostridioides difficile infection     Hyperlipidemia     Low back pain     Right knee pain     Sleep apnea     NO USING MACHINE     Past Surgical History:   Procedure Laterality Date     APPENDECTOMY      CARPAL TUNNEL RELEASE Right     CATARACT EXTRACTION Bilateral     ERCP WITH STENT PLACEMENT N/A 8/29/2023    Procedure: ENDOSCOPIC RETROGRADE CHOLANGIOPANCREATOGRAPHY WITH SPHINCTEROTOMY; BRUSHING AND STENT PLACEMENT;  Surgeon: Catarino Mg MD;  Location: [facility] ENDOSCOPY;  Service: Gastroenterology;  Laterality: N/A;  pre-biliary obstruction  post-    HAMMER TOE REPAIR Left     HIP ARTHROPLASTY Left     TOTAL KNEE ARTHROPLASTY Right 7/13/2020    Procedure: TOTAL KNEE ARTHROPLASTY;  Surgeon: Krish Ramirez MD;  Location: [facility] MAIN OR;  Service: Orthopedics;  Laterality: Right;     Family History   Problem Relation Age of Onset    Diabetes Brother     Hypertension Brother     Clotting disorder Son     Malig Hyperthermia Neg Hx      Social History     Tobacco Use    Smoking status: Never    Smokeless tobacco: Never   Vaping Use    Vaping Use: Never used   Substance Use Topics    Alcohol use: No    Drug use: Never     Current Facility-Administered Medications on File Prior to Encounter   Medication Dose Route Frequency Provider Last Rate Last Admin    Chlorhexidine Gluconate 2 % pads 2 each  2 pad  Apply externally BID Krish Ramirez MD         Current Outpatient Medications on File Prior to Encounter   Medication Sig Dispense Refill    cetirizine (zyrTEC) 10 MG tablet Take 1 tablet by mouth Daily. 30 tablet 0    cholecalciferol (VITAMIN D3) 25 MCG (1000 UT) tablet Take 1 tablet by mouth Daily.      hydrOXYzine (ATARAX) 25 MG tablet Take 1 tablet by mouth 3 (Three) Times a Day As Needed for Itching. 30 tablet 0    lactulose (CHRONULAC) 10 GM/15ML solution Take 15 mL by mouth 2 (Two) Times a Day As Needed (constipation). 473 mL 0    ondansetron ODT (ZOFRAN-ODT) 8 MG disintegrating tablet Place 1 tablet on the tongue Every 8 (Eight) Hours As Needed for Nausea or Vomiting. 15 tablet 0    PRISTIQ 50 MG 24 hr tablet Take 1 tablet by mouth Daily.       Allergies   Allergen Reactions     Atorvastatin        Objective    Objective     Vital Signs  Temp:  [98.2 °F (36.8 °C)-99.6 °F (37.6 °C)] 98.2 °F (36.8 °C)  Heart Rate:  [] 102  Resp:  [18-20] 18  BP: (116-159)/() 116/63  SpO2:  [97 %-100 %] 98 %  on   ;   Device (Oxygen Therapy): room air  Body mass index is 20.88 kg/m².    Physical Exam  Vitals and nursing note reviewed.   Constitutional:       General: She is not in acute distress.     Appearance: She is ill-appearing. She is not diaphoretic.   HENT:      Head: Atraumatic.   Eyes:      General: Scleral icterus present.   Cardiovascular:      Rate and Rhythm: Normal rate and regular rhythm.      Pulses: Normal pulses.   Pulmonary:      Effort: Pulmonary effort is normal.      Breath sounds: No wheezing.   Abdominal:      General: There is no distension.      Palpations: Abdomen is soft.      Tenderness: There is no abdominal tenderness. There is no guarding or rebound.   Musculoskeletal:         General: No swelling or tenderness.   Skin:     General: Skin is warm and dry.      Coloration: Skin is jaundiced.   Neurological:      Mental Status: She is alert.      Cranial Nerves: No cranial nerve deficit.   Psychiatric:         Mood and Affect: Mood normal.         Behavior: Behavior normal.       Results Review:  I reviewed the patient's new clinical results.  I reviewed the patient's new imaging results and agree with the interpretation.  I personally viewed and interpreted the patient's EKG/Telemetry data  I reviewed prior records.    Lab Results (last 24 hours)       Procedure Component Value Units Date/Time    CBC & Differential [[id number]]  (Abnormal) Collected: 09/09/23 2156    Specimen: Blood Updated: 09/09/23 2205    Narrative:      The following orders were created for panel order CBC & Differential.  Procedure                               Abnormality         Status                     ---------                               -----------         ------                     CBC  Auto Differential[[id number]]        Abnormal            Final result                 Please view results for these tests on the individual orders.    Comprehensive Metabolic Panel [[id number]]  (Abnormal) Collected: 09/09/23 2156    Specimen: Blood Updated: 09/09/23 2224     Glucose 158 mg/dL      BUN 27 mg/dL      Creatinine 0.96 mg/dL      Sodium 132 mmol/L      Potassium 4.0 mmol/L      Comment: Slight hemolysis detected by analyzer. Results may be affected.        Chloride 95 mmol/L      CO2 19.7 mmol/L      Calcium 10.0 mg/dL      Total Protein 8.0 g/dL      Albumin 3.1 g/dL      ALT (SGPT) 142 U/L      AST (SGOT) 159 U/L      Comment: Slight hemolysis detected by analyzer. Results may be affected.        Alkaline Phosphatase 185 U/L      Total Bilirubin 9.0 mg/dL      Globulin 4.9 gm/dL      A/G Ratio 0.6 g/dL      BUN/Creatinine Ratio 28.1     Anion Gap 17.3 mmol/L      eGFR 59.9 mL/min/1.73     Narrative:      GFR Normal >60  Chronic Kidney Disease <60  Kidney Failure <15    The GFR formula is only valid for adults with stable renal function between ages 18 and 70.    Lipase [[id number]]  (Abnormal) Collected: 09/09/23 2156    Specimen: Blood Updated: 09/09/23 2222     Lipase 148 U/L     Protime-INR [[id number]]  (Abnormal) Collected: 09/09/23 2156    Specimen: Blood Updated: 09/09/23 2215     Protime 14.6 Seconds      INR 1.13    aPTT [[id number]]  (Normal) Collected: 09/09/23 2156    Specimen: Blood Updated: 09/09/23 2215     PTT 28.4 seconds     Lactic Acid, Plasma [[id number]]  (Abnormal) Collected: 09/09/23 2156    Specimen: Blood Updated: 09/09/23 2223     Lactate 3.5 mmol/L     Ammonia [[id number]]  (Normal) Collected: 09/09/23 2156    Specimen: Blood Updated: 09/09/23 2329     Ammonia 29 umol/L     CBC Auto Differential [[id number]]  (Abnormal) Collected: 09/09/23 2156    Specimen: Blood Updated: 09/09/23 2205     WBC 23.57 10*3/mm3      RBC 3.80 10*6/mm3      Hemoglobin 11.9 g/dL      Hematocrit 35.0 %       MCV 92.1 fL      MCH 31.3 pg      MCHC 34.0 g/dL      RDW 16.7 %      RDW-SD 57.6 fl      MPV 9.7 fL      Platelets 505 10*3/mm3      Neutrophil % 85.2 %      Lymphocyte % 5.7 %      Monocyte % 6.6 %      Eosinophil % 0.0 %      Basophil % 0.3 %      Immature Grans % 2.2 %      Neutrophils, Absolute 20.07 10*3/mm3      Lymphocytes, Absolute 1.34 10*3/mm3      Monocytes, Absolute 1.56 10*3/mm3      Eosinophils, Absolute 0.01 10*3/mm3      Basophils, Absolute 0.07 10*3/mm3      Immature Grans, Absolute 0.52 10*3/mm3      nRBC 0.2 /100 WBC     Urinalysis With Culture If Indicated - Urine, Clean Catch [[id number]]  (Abnormal) Collected: 09/09/23 2222    Specimen: Urine, Clean Catch Updated: 09/09/23 2238     Color, UA Dark Yellow     Appearance, UA Cloudy     pH, UA 5.5     Specific Gravity, UA 1.023     Glucose, UA Negative     Ketones, UA Negative     Bilirubin, UA Large (3+)     Blood, UA Negative     Protein, UA 30 mg/dL (1+)     Leuk Esterase, UA Small (1+)     Nitrite, UA Negative     Urobilinogen, UA 2.0 E.U./dL    Narrative:      In absence of clinical symptoms, the presence of pyuria, bacteria, and/or nitrites on the urinalysis result does not correlate with infection.    Urinalysis, Microscopic Only - Urine, Clean Catch [[id number]]  (Abnormal) Collected: 09/09/23 2222    Specimen: Urine, Clean Catch Updated: 09/09/23 2250     RBC, UA 0-2 /HPF      WBC, UA 3-5 /HPF      Comment: Urine culture not indicated.        Bacteria, UA 4+ /HPF      Squamous Epithelial Cells, UA 0-2 /HPF      Hyaline Casts, UA Too Numerous to Count /LPF      Methodology Manual Light Microscopy    Blood Culture - Blood, Arm, Left [[id number]] Collected: 09/09/23 2250    Specimen: Blood from Arm, Left Updated: 09/09/23 2253    Blood Culture - Blood, Arm, Right [[id number]] Collected: 09/09/23 2309    Specimen: Blood from Arm, Right Updated: 09/09/23 2319    STAT Lactic Acid, Reflex [[id number]]  (Abnormal) Collected: 09/10/23 0054     Specimen: Blood Updated: 09/10/23 0129     Lactate 2.7 mmol/L     STAT Lactic Acid, Reflex [[id number]]  (Abnormal) Collected: 09/10/23 0359    Specimen: Blood Updated: 09/10/23 0527     Lactate 2.1 mmol/L     STAT Lactic Acid, Reflex [[id number]]  (Normal) Collected: 09/10/23 0701    Specimen: Blood Updated: 09/10/23 0752     Lactate 1.8 mmol/L             Imaging Results (Last 24 Hours)       Procedure Component Value Units Date/Time    CT Abdomen Pelvis With Contrast [[id number]] Collected: 09/10/23 0010     Updated: 09/10/23 0018    Narrative:      CT ABDOMEN PELVIS W CONTRAST-     Radiation dose reduction techniques were utilized, including automated  exposure control and exposure modulation based on body size.     Clinical: Ascending cholangitis     COMPARISON 8/29/2023     FINDINGS:  1. There has been interval placement of the biliary stent with  resolution of the biliary duct dilatation. There is pneumobilia now  demonstrated. There is some fluid demonstrated within the distal portion  of the stent. There is gallbladder hydrops, diameter is 6 cm. There is  significant gallbladder wall thickening and irregularity with  pericholecystic fluid and surrounding phlegmon consistent with acute  cholecystitis. No gallstone is demonstrated.     2. Periesophageal hiatal hernia similar to the previous examination.     3. Pancreatic duct diameter upper limits of normal, no pancreatic mass  seen. The spleen, adrenal glands and kidneys are satisfactory in  appearance.     4. Stable left ovarian/adnexal cyst. Uterus and right adnexa  unremarkable. Urinary bladder is satisfactory in appearance.     5. Caliber of the large and small bowel is within normal limits.     CONCLUSION: Interval placement of a biliary stent with resolution of the  biliary duct dilatation. There is however considerable gallbladder wall  thickening with gallbladder hydrops, pericholecystic fluid and phlegmon,  consistent with cholecystitis. No gallstone  seen.              This report was finalized on 9/10/2023 12:15 AM by Dr. Hugh Moffett M.D.                   SCANNED - TELEMETRY     Final Result      SCANNED - TELEMETRY     Final Result           Assessment/Plan     Active Hospital Problems    Diagnosis  POA    **Severe sepsis [A41.9, R65.20]  Yes    Pancreatic mass [K86.89]  Yes    Mixed hyperlipidemia [E78.2]  Yes    Benign essential hypertension [I10]  Yes      Resolved Hospital Problems   No resolved problems to display.       Ms. Bose is a 78 y.o.     Sepsis: Leukocytosis and tachycardia on admission.  Lactic acidosis was present and responded to fluids.  Blood cultures drawn.  She does have bacteriuria however he is not reporting any acute urinary symptoms.  She also has findings consistent with cholecystitis on CT.  Not reporting acute abdominal pain.  Diarrhea has been reported as well.  Send stool studies.  Continue Zosyn.  Consult surgery.  Presumptive pancreatic cancer/obstructive jaundice: Recent biliary stent placement.  Cytology from brushing is suspicious for malignancy but report states not diagnostic.  Plan to consult oncology to review this with her in the morning and continue work-up.  HLD: Statin allergy listed.  Monitor LFTs.  Hypertension: Pressure is not elevated acutely.  Plan to monitor.  PPx: SCD  I discussed the patient's findings and my recommendations with patient.      Ankur Lehman MD  [facility]  09/10/23  09:05 EDT    Dictated portions of note using dragon dictation software.

## 2023-09-11 LAB
ADV 40+41 DNA STL QL NAA+NON-PROBE: NOT DETECTED
ALBUMIN SERPL-MCNC: 2.6 G/DL (ref 3.5–5.2)
ALBUMIN/GLOB SERPL: 0.9 G/DL
ALP SERPL-CCNC: 269 U/L (ref 39–117)
ALT SERPL W P-5'-P-CCNC: 88 U/L (ref 1–33)
ANION GAP SERPL CALCULATED.3IONS-SCNC: 8.2 MMOL/L (ref 5–15)
ANISOCYTOSIS BLD QL: ABNORMAL
AST SERPL-CCNC: 104 U/L (ref 1–32)
ASTRO TYP 1-8 RNA STL QL NAA+NON-PROBE: NOT DETECTED
BASOPHILS # BLD MANUAL: 0.2 10*3/MM3 (ref 0–0.2)
BASOPHILS NFR BLD MANUAL: 1 % (ref 0–1.5)
BILIRUB CONJ SERPL-MCNC: 4.8 MG/DL (ref 0–0.3)
BILIRUB SERPL-MCNC: 6.1 MG/DL (ref 0–1.2)
BUN SERPL-MCNC: 12 MG/DL (ref 8–23)
BUN/CREAT SERPL: 21.4 (ref 7–25)
C CAYETANENSIS DNA STL QL NAA+NON-PROBE: NOT DETECTED
C COLI+JEJ+UPSA DNA STL QL NAA+NON-PROBE: NOT DETECTED
C DIFF TOX GENS STL QL NAA+PROBE: NEGATIVE
CALCIUM SPEC-SCNC: 8.5 MG/DL (ref 8.6–10.5)
CHLORIDE SERPL-SCNC: 103 MMOL/L (ref 98–107)
CO2 SERPL-SCNC: 26.8 MMOL/L (ref 22–29)
CREAT SERPL-MCNC: 0.56 MG/DL (ref 0.57–1)
CRYPTOSP DNA STL QL NAA+NON-PROBE: NOT DETECTED
DEPRECATED RDW RBC AUTO: 51.6 FL (ref 37–54)
DEPRECATED RDW RBC AUTO: 53.4 FL (ref 37–54)
E HISTOLYT DNA STL QL NAA+NON-PROBE: NOT DETECTED
EAEC PAA PLAS AGGR+AATA ST NAA+NON-PRB: NOT DETECTED
EC STX1+STX2 GENES STL QL NAA+NON-PROBE: NOT DETECTED
EGFRCR SERPLBLD CKD-EPI 2021: 92.4 ML/MIN/1.73
EPEC EAE GENE STL QL NAA+NON-PROBE: DETECTED
ERYTHROCYTE [DISTWIDTH] IN BLOOD BY AUTOMATED COUNT: 15.1 % (ref 12.3–15.4)
ERYTHROCYTE [DISTWIDTH] IN BLOOD BY AUTOMATED COUNT: 15.6 % (ref 12.3–15.4)
ETEC LTA+ST1A+ST1B TOX ST NAA+NON-PROBE: NOT DETECTED
G LAMBLIA DNA STL QL NAA+NON-PROBE: NOT DETECTED
GLOBULIN UR ELPH-MCNC: 3 GM/DL
GLUCOSE SERPL-MCNC: 123 MG/DL (ref 65–99)
HCT VFR BLD AUTO: 23.7 % (ref 34–46.6)
HCT VFR BLD AUTO: 25.4 % (ref 34–46.6)
HEMOCCULT STL QL: POSITIVE
HGB BLD-MCNC: 8 G/DL (ref 12–15.9)
HGB BLD-MCNC: 8.6 G/DL (ref 12–15.9)
INR PPP: 1.16 (ref 0.9–1.1)
LYMPHOCYTES # BLD MANUAL: 0.79 10*3/MM3 (ref 0.7–3.1)
LYMPHOCYTES # BLD MANUAL: 1.06 10*3/MM3 (ref 0.7–3.1)
LYMPHOCYTES NFR BLD MANUAL: 1 % (ref 5–12)
LYMPHOCYTES NFR BLD MANUAL: 4 % (ref 5–12)
MCH RBC QN AUTO: 31.6 PG (ref 26.6–33)
MCH RBC QN AUTO: 31.6 PG (ref 26.6–33)
MCHC RBC AUTO-ENTMCNC: 33.8 G/DL (ref 31.5–35.7)
MCHC RBC AUTO-ENTMCNC: 33.9 G/DL (ref 31.5–35.7)
MCV RBC AUTO: 93.4 FL (ref 79–97)
MCV RBC AUTO: 93.7 FL (ref 79–97)
METAMYELOCYTES NFR BLD MANUAL: 1 % (ref 0–0)
METAMYELOCYTES NFR BLD MANUAL: 3 % (ref 0–0)
MONOCYTES # BLD: 0.18 10*3/MM3 (ref 0.1–0.9)
MONOCYTES # BLD: 0.79 10*3/MM3 (ref 0.1–0.9)
NEUTROPHILS # BLD AUTO: 16.26 10*3/MM3 (ref 1.7–7)
NEUTROPHILS # BLD AUTO: 17.27 10*3/MM3 (ref 1.7–7)
NEUTROPHILS NFR BLD MANUAL: 88 % (ref 42.7–76)
NEUTROPHILS NFR BLD MANUAL: 92 % (ref 42.7–76)
NOROVIRUS GI+II RNA STL QL NAA+NON-PROBE: NOT DETECTED
NRBC BLD AUTO-RTO: 0.2 /100 WBC (ref 0–0.2)
NRBC BLD AUTO-RTO: 0.6 /100 WBC (ref 0–0.2)
P SHIGELLOIDES DNA STL QL NAA+NON-PROBE: NOT DETECTED
PLAT MORPH BLD: NORMAL
PLAT MORPH BLD: NORMAL
PLATELET # BLD AUTO: 380 10*3/MM3 (ref 140–450)
PLATELET # BLD AUTO: 387 10*3/MM3 (ref 140–450)
PMV BLD AUTO: 9.3 FL (ref 6–12)
PMV BLD AUTO: 9.4 FL (ref 6–12)
POLYCHROMASIA BLD QL SMEAR: ABNORMAL
POTASSIUM SERPL-SCNC: 3.7 MMOL/L (ref 3.5–5.2)
PROT SERPL-MCNC: 5.6 G/DL (ref 6–8.5)
PROTHROMBIN TIME: 15 SECONDS (ref 11.7–14.2)
RBC # BLD AUTO: 2.53 10*6/MM3 (ref 3.77–5.28)
RBC # BLD AUTO: 2.72 10*6/MM3 (ref 3.77–5.28)
RBC MORPH BLD: NORMAL
RVA RNA STL QL NAA+NON-PROBE: NOT DETECTED
S ENT+BONG DNA STL QL NAA+NON-PROBE: NOT DETECTED
SAPO I+II+IV+V RNA STL QL NAA+NON-PROBE: NOT DETECTED
SHIGELLA SP+EIEC IPAH ST NAA+NON-PROBE: NOT DETECTED
SODIUM SERPL-SCNC: 138 MMOL/L (ref 136–145)
V CHOL+PARA+VUL DNA STL QL NAA+NON-PROBE: NOT DETECTED
V CHOLERAE DNA STL QL NAA+NON-PROBE: NOT DETECTED
VARIANT LYMPHS NFR BLD MANUAL: 4 % (ref 19.6–45.3)
VARIANT LYMPHS NFR BLD MANUAL: 6 % (ref 19.6–45.3)
WBC MORPH BLD: NORMAL
WBC MORPH BLD: NORMAL
WBC NRBC COR # BLD: 17.67 10*3/MM3 (ref 3.4–10.8)
WBC NRBC COR # BLD: 19.63 10*3/MM3 (ref 3.4–10.8)
Y ENTEROCOL DNA STL QL NAA+NON-PROBE: NOT DETECTED

## 2023-09-11 PROCEDURE — 82272 OCCULT BLD FECES 1-3 TESTS: CPT | Performed by: INTERNAL MEDICINE

## 2023-09-11 PROCEDURE — 80053 COMPREHEN METABOLIC PANEL: CPT | Performed by: INTERNAL MEDICINE

## 2023-09-11 PROCEDURE — 85025 COMPLETE CBC W/AUTO DIFF WBC: CPT | Performed by: INTERNAL MEDICINE

## 2023-09-11 PROCEDURE — 99231 SBSQ HOSP IP/OBS SF/LOW 25: CPT | Performed by: SURGERY

## 2023-09-11 PROCEDURE — 99222 1ST HOSP IP/OBS MODERATE 55: CPT | Performed by: INTERNAL MEDICINE

## 2023-09-11 PROCEDURE — 85027 COMPLETE CBC AUTOMATED: CPT | Performed by: INTERNAL MEDICINE

## 2023-09-11 PROCEDURE — 87507 IADNA-DNA/RNA PROBE TQ 12-25: CPT | Performed by: INTERNAL MEDICINE

## 2023-09-11 PROCEDURE — 25010000002 PIPERACILLIN SOD-TAZOBACTAM PER 1 G: Performed by: NURSE PRACTITIONER

## 2023-09-11 PROCEDURE — 87493 C DIFF AMPLIFIED PROBE: CPT | Performed by: INTERNAL MEDICINE

## 2023-09-11 PROCEDURE — 86900 BLOOD TYPING SEROLOGIC ABO: CPT

## 2023-09-11 PROCEDURE — 85007 BL SMEAR W/DIFF WBC COUNT: CPT | Performed by: INTERNAL MEDICINE

## 2023-09-11 PROCEDURE — 82248 BILIRUBIN DIRECT: CPT | Performed by: INTERNAL MEDICINE

## 2023-09-11 PROCEDURE — 85610 PROTHROMBIN TIME: CPT | Performed by: INTERNAL MEDICINE

## 2023-09-11 PROCEDURE — 99232 SBSQ HOSP IP/OBS MODERATE 35: CPT | Performed by: INTERNAL MEDICINE

## 2023-09-11 PROCEDURE — 86901 BLOOD TYPING SEROLOGIC RH(D): CPT

## 2023-09-11 RX ORDER — PANTOPRAZOLE SODIUM 40 MG/10ML
40 INJECTION, POWDER, LYOPHILIZED, FOR SOLUTION INTRAVENOUS EVERY 12 HOURS SCHEDULED
Status: DISCONTINUED | OUTPATIENT
Start: 2023-09-11 | End: 2023-09-19 | Stop reason: HOSPADM

## 2023-09-11 RX ORDER — PANTOPRAZOLE SODIUM 40 MG/10ML
INJECTION, POWDER, LYOPHILIZED, FOR SOLUTION INTRAVENOUS
Status: ACTIVE
Start: 2023-09-11 | End: 2023-09-12

## 2023-09-11 RX ADMIN — SODIUM CHLORIDE 75 ML/HR: 9 INJECTION, SOLUTION INTRAVENOUS at 03:00

## 2023-09-11 RX ADMIN — PIPERACILLIN SODIUM AND TAZOBACTAM SODIUM 3.38 G: 3; .375 INJECTION, SOLUTION INTRAVENOUS at 14:53

## 2023-09-11 RX ADMIN — PIPERACILLIN SODIUM AND TAZOBACTAM SODIUM 3.38 G: 3; .375 INJECTION, SOLUTION INTRAVENOUS at 22:13

## 2023-09-11 RX ADMIN — SODIUM CHLORIDE 125 ML/HR: 9 INJECTION, SOLUTION INTRAVENOUS at 21:15

## 2023-09-11 RX ADMIN — Medication 10 ML: at 21:12

## 2023-09-11 RX ADMIN — PIPERACILLIN SODIUM AND TAZOBACTAM SODIUM 3.38 G: 3; .375 INJECTION, SOLUTION INTRAVENOUS at 06:22

## 2023-09-11 RX ADMIN — PANTOPRAZOLE SODIUM 40 MG: 40 INJECTION, POWDER, FOR SOLUTION INTRAVENOUS at 21:16

## 2023-09-11 NOTE — NURSING NOTE
Notified Dr. Ware pt just had a large dark red bloody bm, orders are noted for a stat cbc and make primary aware, will continue to monitor

## 2023-09-11 NOTE — PROGRESS NOTES
Name: Lizzie Bose ADMIT: 2023   : 1945  PCP: Ct Alford APRN    MRN: [id number] LOS: 2 days   AGE/SEX: 78 y.o. female  ROOM: [facility]     Subjective   Subjective   Chief Complaint   Patient presents with    Abdominal Pain    Jaundice     No CP SOA. No NV. She is not having diarrhea.     Objective   Objective   Vital Signs  Temp:  [97.5 °F (36.4 °C)-100.6 °F (38.1 °C)] 98.8 °F (37.1 °C)  Heart Rate:  [82-96] 96  Resp:  [18-20] 18  BP: (115-120)/(70-74) 118/74  SpO2:  [96 %-100 %] 96 %  on   ;   Device (Oxygen Therapy): room air  Body mass index is 20.88 kg/m².    Physical Exam  Vitals and nursing note reviewed.   Constitutional:       General: She is not in acute distress.     Appearance: She is ill-appearing. She is not diaphoretic.   HENT:      Head: Atraumatic.   Eyes:      General: Scleral icterus present.   Cardiovascular:      Rate and Rhythm: Normal rate and regular rhythm.      Pulses: Normal pulses.   Pulmonary:      Effort: Pulmonary effort is normal.      Breath sounds: No wheezing.   Abdominal:      General: There is no distension.      Palpations: Abdomen is soft.      Tenderness: There is no abdominal tenderness. There is no guarding or rebound.   Musculoskeletal:         General: No swelling or tenderness.   Skin:     General: Skin is warm and dry.      Coloration: Skin is jaundiced.   Neurological:      Mental Status: She is alert. Mental status is at baseline.   Psychiatric:         Mood and Affect: Mood normal.         Behavior: Behavior normal.     Results Review  I reviewed the patient's new clinical results.    Results from last 7 days   Lab Units 23  0640 09/10/23  1945/23  2156   WBC 10*3/mm3 19.63* 19.58* 23.57*   HEMOGLOBIN g/dL 8.6* 8.7* 11.9*   PLATELETS 10*3/mm3 387 373 505*     Results from last 7 days   Lab Units 23  0640 09/10/23  18123  2156   SODIUM mmol/L 138 131* 132*   POTASSIUM mmol/L 3.7 3.9 4.0   CHLORIDE mmol/L 103 99 95*   CO2  mmol/L 26.8 19.9* 19.7*   BUN mg/dL 12 14 27*   CREATININE mg/dL 0.56* 0.59 0.96   GLUCOSE mg/dL 123* 120* 158*   EGFR mL/min/1.73 92.4 91.2 59.9*     Results from last 7 days   Lab Units 09/11/23  0640 09/10/23  1812 09/09/23  2156   ALBUMIN g/dL 2.6* 2.1* 3.1*   BILIRUBIN mg/dL 6.1* 5.6* 9.0*   ALK PHOS U/L 269* 146* 185*   AST (SGOT) U/L 104* 100* 159*   ALT (SGPT) U/L 88* 88* 142*     Results from last 7 days   Lab Units 09/11/23  0640 09/10/23  1812 09/09/23  2156   CALCIUM mg/dL 8.5* 8.4* 10.0   ALBUMIN g/dL 2.6* 2.1* 3.1*     Results from last 7 days   Lab Units 09/10/23  0701 09/10/23  0359 09/10/23  0054 09/09/23  2156   LACTATE mmol/L 1.8 2.1* 2.7* 3.5*     No results found for: HGBA1C, POCGLU    No radiology results for the last day    I have personally reviewed all medications:  Scheduled Medications  cetirizine, 10 mg, Oral, Daily  desvenlafaxine, 50 mg, Oral, Daily  piperacillin-tazobactam, 3.375 g, Intravenous, Q8H      Infusions  sodium chloride, 75 mL/hr, Last Rate: 75 mL/hr (09/11/23 0300)      Diet  NPO Diet NPO Type: Strict NPO    I have personally reviewed:  [x]  Laboratory   [x]  Microbiology   []  Radiology   []  EKG/Telemetry  []  Cardiology/Vascular   []  Pathology    []  Records       Assessment/Plan     Active Hospital Problems    Diagnosis  POA    **Severe sepsis [A41.9, R65.20]  Yes    Pancreatic mass [K86.89]  Yes    Mixed hyperlipidemia [E78.2]  Yes    Benign essential hypertension [I10]  Yes      Resolved Hospital Problems   No resolved problems to display.       78 y.o. female admitted with Severe sepsis.    Cholecystitis: Blood cultures ngtd. Continue zosyn. Cholecystectomy planned eventually. Surgery and GI following.  Sepsis: Lactic acidosis improved. Fever last night. Monitoring.  Pancreatic Mass/Obstructive Jaundice: Stent placement last admission. This is presumed pancreatic cancer with high ca19-9 and brushing suspicious for malignancy but not diagnostic. Oncology  consult.  HTN: Acceptable acutely.  HLD: Statin allergy  PPX: SCD  Disposition: TBD    Expected Discharge Date: 9/13/2023; Expected Discharge Time:      Ankur Lehman MD  [facility]  09/11/23  13:12 EDT    Dictated portions of note using Dragon dictation software.  Copied text in this note has been reviewed by me and remains accurate as of 09/11/23

## 2023-09-11 NOTE — OUTREACH NOTE
Medical Week 1 Survey      Flowsheet Row Responses   [facility] patient discharged from? [facility]   Does the patient have one of the following disease processes/diagnoses(primary or secondary)? Other   Week 1 attempt successful? No   Unsuccessful attempts Attempt 1   Revoke Readmitted            Adina KAUR - Registered Nurse

## 2023-09-11 NOTE — CONSULTS
General Surgery consult    Summary:    Mrs. Lizzie Bose is a 78 y.o. year old lady with pancreatic mass, hyperbilirubinemia, and what appears to be acute cholecystitis.  Will likely require cholecystectomy at some point.  We will follow-up GI plans for management of her elevated bilirubin.  Follow-up a.mAbhi CMP.    Chief Complaint:    Abdominal pain    History of Present Illness:    Mrs. Lizzie Bose is a 78 y.o. year old lady who presented to the hospital with abdominal pain, weakness, and diarrhea.  She was recently admitted for obstructive jaundice from likely pancreatic cancer.  She underwent ERCP with stent placement at that time.    Past Medical History:   Past Medical History:   Diagnosis Date    ADD (attention deficit disorder)     Arthritis     Depression     High blood pressure     History of Clostridioides difficile infection     Hyperlipidemia     Low back pain     Right knee pain     Sleep apnea     NO USING MACHINE      Past Surgical History:    Past Surgical History:   Procedure Laterality Date    APPENDECTOMY      CARPAL TUNNEL RELEASE Right     CATARACT EXTRACTION Bilateral     ERCP WITH STENT PLACEMENT N/A 8/29/2023    Procedure: ENDOSCOPIC RETROGRADE CHOLANGIOPANCREATOGRAPHY WITH SPHINCTEROTOMY; BRUSHING AND STENT PLACEMENT;  Surgeon: Catarino Mg MD;  Location: [facility] ENDOSCOPY;  Service: Gastroenterology;  Laterality: N/A;  pre-biliary obstruction  post-    HAMMER TOE REPAIR Left     HIP ARTHROPLASTY Left     TOTAL KNEE ARTHROPLASTY Right 7/13/2020    Procedure: TOTAL KNEE ARTHROPLASTY;  Surgeon: Krish Ramirez MD;  Location: [facility] OR;  Service: Orthopedics;  Laterality: Right;     Family History:    Family History   Problem Relation Age of Onset    Diabetes Brother     Hypertension Brother     Clotting disorder Son     Malig Hyperthermia Neg Hx      Social History:    Social History     Socioeconomic History    Marital status:    Tobacco Use    Smoking status: Never     Smokeless tobacco: Never   Vaping Use    Vaping Use: Never used   Substance and Sexual Activity    Alcohol use: No    Drug use: Never    Sexual activity: Defer     Allergies:   Allergies   Allergen Reactions    Atorvastatin        Medications:     Current Facility-Administered Medications:     aluminum-magnesium hydroxide-simethicone (MAALOX MAX) 400-400-40 MG/5ML suspension 15 mL, 15 mL, Oral, Q6H PRN, Sussy Crawley APRN    cetirizine (zyrTEC) tablet 10 mg, 10 mg, Oral, Daily, Ankur Lehman MD, 10 mg at 09/10/23 1700    desvenlafaxine (PRISTIQ) 24 hr tablet 50 mg, 50 mg, Oral, Daily, Ankur Lehman MD, 50 mg at 09/10/23 2018    hydrOXYzine (ATARAX) tablet 25 mg, 25 mg, Oral, TID PRN, Ankur Lehman MD    lactulose (CHRONULAC) 10 GM/15ML solution 10 g, 10 g, Oral, BID PRN, Ankur Lehman MD    melatonin tablet 3 mg, 3 mg, Oral, Nightly PRN, Sussy Crawley APRN    nitroglycerin (NITROSTAT) SL tablet 0.4 mg, 0.4 mg, Sublingual, Q5 Min PRN, Sussy Crawley APRN    ondansetron (ZOFRAN) tablet 4 mg, 4 mg, Oral, Q6H PRN **OR** ondansetron (ZOFRAN) injection 4 mg, 4 mg, Intravenous, Q6H PRN, Sussy Crawley APRN    piperacillin-tazobactam (ZOSYN) 3.375 g in iso-osmotic dextrose 50 ml (premix), 3.375 g, Intravenous, Q8H, Sussy Crawley APRN, 3.375 g at 09/10/23 1547    sodium chloride 0.9 % flush 10 mL, 10 mL, Intravenous, PRN, Sussy Crawley APRN    sodium chloride 0.9 % infusion, 75 mL/hr, Intravenous, Continuous, Ankur Lehman MD, Last Rate: 75 mL/hr at 09/10/23 1309, 75 mL/hr at 09/10/23 1309    Facility-Administered Medications Ordered in Other Encounters:     Chlorhexidine Gluconate 2 % pads 2 each, 2 pad , Apply externally, BID, Kirsh Ramirez MD    Radiology/Endoscopy:    CT abdomen pelvis reviewed: Biliary stent with pneumobilia, gallbladder hydrops    Labs:    White blood cell count 19 hemoglobin 8.7 platelets 373 creatinine 0.59  alk phos 142  total bilirubin 9.0    Review of Systems:   Influenza-like illness: no fever, no  cough, no  sore throat, no  body aches, no loss of sense of taste or smell, no known exposure to person with Covid-19.  Constitutional: Negative for fevers or chills  HENT: Negative for hearing loss or runny nose  Eyes: Negative for vision changes or scleral icterus  Respiratory: Negative for cough or shortness of breath  Cardiovascular: Negative for chest pain or heart palpitations  Gastrointestinal: + for abdominal pain, nausea, denies vomiting, constipation, melena, or hematochezia  Genitourinary: Negative for hematuria or dysuria  Musculoskeletal: Negative for joint swelling or gait instability  Neurologic: Negative for tremors or seizures  Psychiatric: Negative for suicidal ideations or depression  All other systems reviewed and negative    Physical Exam:   Constitutional: Frail, no acute distress, BMI 20  Eyes:  Conjunctivae normal, jaundiced  ENMT: Hearing grossly normal, oral mucosa moist  Neck: Supple, trachea midline  Respiratory: Clear to auscultation, normal inspiratory effort  Cardiovascular: Regular rate, no peripheral edema, no jugular venous distention  Gastrointestinal: Soft, tender in the right upper quadrant, nondistended   Skin:  Warm, dry, no rash on visualized skin surfaces  Musculoskeletal: Symmetric strength, normal gait  Psychiatric: Alert and oriented ×3, normal affect     CORAZON ALCARAZ M.D.  General and Endoscopic Surgery  [facility]    [address] [place] [address], [address]  [place], [place], [address]  P: [phone]  F: [phone]

## 2023-09-11 NOTE — PROGRESS NOTES
Follow-up distended gallbladder/jaundice    Subjective:  Overall the patient feels all right.  She denies any abdominal pain and reports being hungry, says that she was eating all right when she was at home.    Objective:  Afebrile today, temperature 100.6 maximum yesterday, heart rate 85 blood pressure 121/71  General: Awake and alert without distress, chronic ill appearance but no acute distress  Eyes: There is significant scleral jaundice, extraocular movements are intact  Abdomen: Soft and nondistended, there is some mild right upper quadrant tenderness without guarding  Skin: Extensive jaundice noted    Labs are reviewed, white blood cell count about the same at 19.6 with 88% neutrophils, chemistries largely unremarkable, bilirubin remains elevated at 6.1, certainly improved from greater than 25 before stent placement lactate normalized yesterday.    CT images are reviewed by me.  The gallbladder appears thickened and grossly distended.    Assessment and plan:  -Distended gallbladder, clinically does not seem like she has symptoms consistent with that of cholecystitis, but certainly the imaging and white count are worrisome  -Perhaps of greater concern however, as her jaundice and pancreatic mass, which in the setting of these borderline brushing findings and significantly elevated CA 19-9 are highly suggestive of primary pancreatic malignancy.  -I think continuing IV antibiotics for now is reasonable  -I think the larger question is whether or not she needs further tissue diagnosis, and in that setting, perhaps an EUS would be best.  -She may require cholecystectomy, but certainly nothing particularly urgent at the moment.  -We will see what medical oncology has to say as well.    Chase Nur MD  General and Endoscopic Surgery  [facility]    [address], [address]  [place], [place], [address]  P: [phone]  F: [phone]

## 2023-09-11 NOTE — CONSULTS
Subjective     REASON FOR CONSULTATION:  Provide an opinion on any further workup or treatment on:    Pancreatic mass                       REQUESTING PHYSICIAN: Jae Robison*    HISTORY OF PRESENT ILLNESS:      Lizzie Bose is a 78 y.o. patient who was admitted on 9/9/2023.  Patient was recently hospitalized at [facility] with obstructive jaundice.  She was admitted on 8/26/2023 and was discharged on 8/31/2023.  She underwent ERCP with placement of a stent on 8/29/2023.  She presented to the ER complaining of diarrhea and generalized weakness.  She continues to have jaundice.  No fever or chills.  No nausea or vomiting.      Past Medical History:   Diagnosis Date    ADD (attention deficit disorder)     Arthritis     Depression     High blood pressure     History of Clostridioides difficile infection     Hyperlipidemia     Low back pain     Right knee pain     Sleep apnea     NO USING MACHINE     Past Surgical History:   Procedure Laterality Date    APPENDECTOMY      CARPAL TUNNEL RELEASE Right     CATARACT EXTRACTION Bilateral     ERCP WITH STENT PLACEMENT N/A 8/29/2023    Procedure: ENDOSCOPIC RETROGRADE CHOLANGIOPANCREATOGRAPHY WITH SPHINCTEROTOMY; BRUSHING AND STENT PLACEMENT;  Surgeon: Catarino Mg MD;  Location: [facility] ENDOSCOPY;  Service: Gastroenterology;  Laterality: N/A;  pre-biliary obstruction  post-    HAMMER TOE REPAIR Left     HIP ARTHROPLASTY Left     TOTAL KNEE ARTHROPLASTY Right 7/13/2020    Procedure: TOTAL KNEE ARTHROPLASTY;  Surgeon: Krish Ramirez MD;  Location: [facility] OR;  Service: Orthopedics;  Laterality: Right;     SCHEDULED MEDS:  cetirizine, 10 mg, Oral, Daily  desvenlafaxine, 50 mg, Oral, Daily  piperacillin-tazobactam, 3.375 g, Intravenous, Q8H      INFUSIONS:  sodium chloride, 75 mL/hr, Last Rate: Stopped (09/11/23 5483)      ALLERGIES:  Allergies   Allergen Reactions    Atorvastatin         Social History     Socioeconomic History    Marital status:     Tobacco Use    Smoking status: Never    Smokeless tobacco: Never   Vaping Use    Vaping Use: Never used   Substance and Sexual Activity    Alcohol use: No    Drug use: Never    Sexual activity: Defer     Family History   Problem Relation Age of Onset    Diabetes Brother     Hypertension Brother     Clotting disorder Son     Malig Hyperthermia Neg Hx       REVIEW OF SYSTEMS:   GENERAL: Generalized weakness.   SKIN: Negative.  HEME/LYMPH: Negative.  EYES: Jaundice.  ENT:  Negative.  RESPIRATORY:  Negative.   CVS:  Negative.   GI: Diarrhea.   :  Negative.   MUSCULOSKELETAL:  Negative.  NEUROLOGICAL:  Negative.  PSYCHIATRIC:  Negative.     Objective   VITAL SIGNS:  Temp:  [97.5 °F (36.4 °C)-100.6 °F (38.1 °C)] 98.6 °F (37 °C)  Heart Rate:  [82-96] 85  Resp:  [18-20] 18  BP: (115-121)/(70-74) 121/71     Wt Readings from Last 3 Encounters:   09/10/23 56.9 kg (125 lb 8 oz)   08/27/23 59 kg (130 lb)   09/15/20 66.2 kg (146 lb)       PHYSICAL EXAMINATION:   GENERAL:  The patient appears in fair general condition, not in acute distress.  SKIN: No skin rash. No ecchymosis.  HEAD:  Normocephalic.  EYES:  Jaundice. Pallor. Pupils equal. EOMI.  NECK:  Supple. No Thyromegaly. No Masses.  LYMPHATICS:  No cervical or supraclavicular lymphadenopathy.  CHEST: Normal respiratory effort. Lungs clear to auscultation.   CARDIAC:  Normal S1 & S2. No murmur.   ABDOMEN:  Soft.  Right upper quadrant tenderness. No Hepatomegaly. No Splenomegaly. No masses.  EXTREMITIES:  No noted deformities. No Calf tenderness.  NEUROLOGICAL:  No Focal neurological deficits.     RESULT REVIEW:   Results from last 7 days   Lab Units 09/11/23  0640 09/10/23  1943/23 2156   WBC 10*3/mm3 19.63* 19.58* 23.57*   NEUTROS ABS 10*3/mm3 17.27* 16.96* 20.07*   LYMPHS ABS 10*3/mm3 0.79 0.61*  --    HEMOGLOBIN g/dL 8.6* 8.7* 11.9*   HEMATOCRIT % 25.4* 25.8* 35.0   PLATELETS 10*3/mm3 387 373 505*     Results from last 7 days   Lab Units 09/11/23  0659  09/10/23  1812 09/09/23 2156   SODIUM mmol/L 138 131* 132*   POTASSIUM mmol/L 3.7 3.9 4.0   CHLORIDE mmol/L 103 99 95*   CO2 mmol/L 26.8 19.9* 19.7*   BUN mg/dL 12 14 27*   CREATININE mg/dL 0.56* 0.59 0.96   CALCIUM mg/dL 8.5* 8.4* 10.0   ALBUMIN g/dL 2.6* 2.1* 3.1*   BILIRUBIN mg/dL 6.1* 5.6* 9.0*   ALK PHOS U/L 269* 146* 185*   ALT (SGPT) U/L 88* 88* 142*   AST (SGOT) U/L 104* 100* 159*     Results from last 7 days   Lab Units 09/11/23  0640 09/09/23 2156   INR  1.16* 1.13*   APTT seconds  --  28.4     Lab Results   Component Value Date    OCCULTBLD Positive (A) 08/29/2023     Component      Latest Ref Rng 8/28/2023   CA 19-9      <=35.0 U/mL 2,836.0 (H)      Cytology exam from 8/29/2023:  Fluid, Common Bile Duct (CBD), Brushing:   A. Scattered atypical ductal/glandular cells present in the thin prep smear, suspicious for well        differentiated neoplasm.    CT abdomen pelvis on 9/10/2023:  1. There has been interval placement of the biliary stent with  resolution of the biliary duct dilatation. There is pneumobilia now  demonstrated. There is some fluid demonstrated within the distal portion  of the stent. There is gallbladder hydrops, diameter is 6 cm. There is  significant gallbladder wall thickening and irregularity with  pericholecystic fluid and surrounding phlegmon consistent with acute  cholecystitis. No gallstone is demonstrated.     2. Periesophageal hiatal hernia similar to the previous examination.     3. Pancreatic duct diameter upper limits of normal, no pancreatic mass  seen. The spleen, adrenal glands and kidneys are satisfactory in  appearance.     4. Stable left ovarian/adnexal cyst. Uterus and right adnexa  unremarkable. Urinary bladder is satisfactory in appearance.     5. Caliber of the large and small bowel is within normal limits.     CONCLUSION: Interval placement of a biliary stent with resolution of the  biliary duct dilatation. There is however considerable gallbladder  wall  thickening with gallbladder hydrops, pericholecystic fluid and phlegmon,  consistent with cholecystitis. No gallstone seen.      Assessment & Plan   *Suspected pancreatic mass on CT on 8/26/2023.  Patient had biliary obstruction with severe intra and extrahepatic biliary ductal dilatation.  The common bile duct was abruptly decompressed.  This was considered to be concerning for pancreatic malignancy.  MRI abdomen on 8/27/2023 revealed a hypoenhancing pancreatic mass within the uncinate process and pancreatic head resulting in obstruction of the common bile duct and severe upstream intra and extrahepatic biliary ductal dilatation.  S/p ERCP with biliary duct stent placement on 8/29/2023.  Cytology from 8/29/2023 revealed scattered atypical ductal/glandular cells suspicious for well-differentiated neoplasm.  This was considered nondiagnostic of neoplasm.  CT on 9/10/2023 revealed interval placement of the biliary stent with resolution of the biliary duct dilatation. No pancreatic mass was identified.    *Left ovarian/adnexal cyst.  It was first identified on CT on 8/26/2023 measuring 5.7 cm.  Ultrasound on 8/30/2023 showed the lesion to have suspicious features.  There was endometrial thickening.  CT on 9/10/2023 showed the left adnexal lesion to be stable    *Anemia.  9/11/2023: Hemoglobin 8.6.    PLAN:    1.  Since the cytology exam was nondiagnostic of malignancy, I recommended obtaining additional tissue for diagnosis.   2.  I discussed the case with Dr. Nur.one option is endoscopic ultrasound exam.  A second option is obtaining an open biopsy if she has gallbladder surgery done.   3.  Obtain anemia work-up.         Kate Mar MD  09/11/23

## 2023-09-11 NOTE — PLAN OF CARE
Goal Outcome Evaluation:  Plan of Care Reviewed With: patient        Progress: no change  Outcome Evaluation: Still need stool sample. Clear liquid last night, NPO after midnight. IVF and IV abx continue. Assist stand by to bathroom. Rested well. VSS. Will continue to monitor.

## 2023-09-11 NOTE — PLAN OF CARE
Goal Outcome Evaluation:              Outcome Evaluation: vss, diet resumed, tolerating po, possible biopsy in future, 1830 had large dark bloody bm, gi notified, pending a notification, stool studies sent, will continue to monitor

## 2023-09-11 NOTE — PROGRESS NOTES
[facility]  Inpatient Progress Note    Reason for Follow Up: Abnormal liver tests    Subjective     Interval History:   Denies right upper quadrant pain    Current Facility-Administered Medications:     aluminum-magnesium hydroxide-simethicone (MAALOX MAX) 400-400-40 MG/5ML suspension 15 mL, 15 mL, Oral, Q6H PRN, Sussy Crawley APRN    cetirizine (zyrTEC) tablet 10 mg, 10 mg, Oral, Daily, Ankur Lehman MD, 10 mg at 09/10/23 1700    desvenlafaxine (PRISTIQ) 24 hr tablet 50 mg, 50 mg, Oral, Daily, Ankur Lehman MD, 50 mg at 09/10/23 2018    hydrOXYzine (ATARAX) tablet 25 mg, 25 mg, Oral, TID PRN, Ankur Lehman MD    lactulose (CHRONULAC) 10 GM/15ML solution 10 g, 10 g, Oral, BID PRN, Ankur Lehman MD    melatonin tablet 3 mg, 3 mg, Oral, Nightly PRN, Sussy Crawley APRN    nitroglycerin (NITROSTAT) SL tablet 0.4 mg, 0.4 mg, Sublingual, Q5 Min PRN, Sussy Crawley APRN    ondansetron (ZOFRAN) tablet 4 mg, 4 mg, Oral, Q6H PRN **OR** ondansetron (ZOFRAN) injection 4 mg, 4 mg, Intravenous, Q6H PRN, Sussy Crawley APRN    piperacillin-tazobactam (ZOSYN) 3.375 g in iso-osmotic dextrose 50 ml (premix), 3.375 g, Intravenous, Q8H, Sussy Crawley APRN, 3.375 g at 09/11/23 0622    sodium chloride 0.9 % flush 10 mL, 10 mL, Intravenous, PRN, Sussy Crawley APRN    sodium chloride 0.9 % infusion, 75 mL/hr, Intravenous, Continuous, Ankur Lehman MD, Last Rate: 75 mL/hr at 09/11/23 0300, 75 mL/hr at 09/11/23 0300    Facility-Administered Medications Ordered in Other Encounters:     Chlorhexidine Gluconate 2 % pads 2 each, 2 pad , Apply externally, BID, Krish Ramirez MD  Review of Systems:    There is weakness and fatigue all other systems reviewed and    Objective     Vital Signs  Temp:  [97.5 °F (36.4 °C)-100.6 °F (38.1 °C)] 98.8 °F (37.1 °C)  Heart Rate:  [68-96] 96  Resp:  [17-20] 18  BP: (109-120)/(58-74) 118/74  Body mass index is 20.88  kg/m².  No intake or output data in the 24 hours ending 09/11/23 1118  No intake/output data recorded.     Physical Exam:   General: patient awake, alert and cooperative   Eyes: Normal lids and lashes, no scleral icterus   Neck: supple, normal ROM   Skin: warm and dry, not jaundiced   Cardiovascular: regular rhythm and rate, no murmurs auscultated   Pulm: clear to auscultation bilaterally, regular and unlabored   Abdomen: soft, nontender, nondistended; normal bowel sounds   Extremities: no rash or edema   Psychiatric: Normal mood and behavior; memory intact     Results Review:     I reviewed the patient's new clinical results.    Results from last 7 days   Lab Units 09/11/23  0640 09/10/23  1943/23  2156   WBC 10*3/mm3 19.63* 19.58* 23.57*   HEMOGLOBIN g/dL 8.6* 8.7* 11.9*   HEMATOCRIT % 25.4* 25.8* 35.0   PLATELETS 10*3/mm3 387 373 505*     Results from last 7 days   Lab Units 09/11/23  0640 09/10/23  1812 09/09/23  2156   SODIUM mmol/L 138 131* 132*   POTASSIUM mmol/L 3.7 3.9 4.0   CHLORIDE mmol/L 103 99 95*   CO2 mmol/L 26.8 19.9* 19.7*   BUN mg/dL 12 14 27*   CREATININE mg/dL 0.56* 0.59 0.96   CALCIUM mg/dL 8.5* 8.4* 10.0   BILIRUBIN mg/dL 6.1* 5.6* 9.0*   ALK PHOS U/L 269* 146* 185*   ALT (SGPT) U/L 88* 88* 142*   AST (SGOT) U/L 104* 100* 159*   GLUCOSE mg/dL 123* 120* 158*     Results from last 7 days   Lab Units 09/11/23  0640 09/09/23 2156   INR  1.16* 1.13*     Lab Results   Lab Value Date/Time    LIPASE 148 (H) 09/09/2023 2156    LIPASE 108 (H) 08/28/2023 0458       Radiology:  CT Abdomen Pelvis With Contrast   Final Result          Assessment & Plan     Active Hospital Problems    Diagnosis     **Severe sepsis     Pancreatic mass     Mixed hyperlipidemia     Benign essential hypertension        Assessment:  Elevated bilirubin with history of sphincterotomy and Wallstent placement: Actually no current evidence of ductal dilation and consistent with good decompression.  Elevated transaminase  levels  Possible pancreatic neoplasm        Plan:  Can monitor LFTs for the present, see no reason for GI intervention at this time with ductal decompression achieved.  There is significant gallbladder wall thickening consistent with acute cholecystitis  General surgery is following plan for cholecystectomy in the near future  Eventually will need biopsy of pancreas mass  We will follow for now.     I discussed the patients findings and my recommendations with patient and nursing staff.    Edwin Ware MD

## 2023-09-11 NOTE — SIGNIFICANT NOTE
09/11/23 0700   OTHER   Discipline physical therapist   Therapy Assessment/Plan (PT)   Criteria for Skilled Interventions Met (PT) no problems identified which require skilled intervention  (Per nsg doc pt is up sba to bathroom and has an ampac of 24.  No indication for acute skilled PT.  Should continue mobilizing with nursing supervision to maintain functional status.)

## 2023-09-11 NOTE — NURSING NOTE
Notified Dr. Lehman of current hgb level, large dark bm, and per Gen surgery and Oncology recommending a biopsy, orders are noted, will continue to monitor

## 2023-09-11 NOTE — CASE MANAGEMENT/SOCIAL WORK
Discharge Planning Assessment  [facility]     Patient Name: Lizzie Bose  MRN: [id number]  Today's Date: 9/11/2023    Admit Date: 9/9/2023    Plan: Return home   Discharge Needs Assessment       Row Name 09/11/23 1008       Living Environment    People in Home alone    Current Living Arrangements home    Potentially Unsafe Housing Conditions none    Primary Care Provided by self    Provides Primary Care For no one    Family Caregiver if Needed friend(s)  States neighbors/friends will help if needed    Family Caregiver Names Son Catalino [phone] is emergency contact    Quality of Family Relationships unable to assess    Able to Return to Prior Arrangements yes       Resource/Environmental Concerns    Resource/Environmental Concerns none    Transportation Concerns none       Food Insecurity    Within the past 12 months, you worried that your food would run out before you got the money to buy more. Never true    Within the past 12 months, the food you bought just didn't last and you didn't have money to get more. Never true       Transition Planning    Patient/Family Anticipates Transition to home    Patient/Family Anticipated Services at Transition none    Transportation Anticipated family or friend will provide       Discharge Needs Assessment    Readmission Within the Last 30 Days current reason for admission unrelated to previous admission    Equipment Currently Used at Home none  Has a C-pap but does not use it    Concerns to be Addressed denies needs/concerns at this time    Anticipated Changes Related to Illness none    Equipment Needed After Discharge none                   Discharge Plan       Row Name 09/11/23 1010       Plan    Plan Return home    Patient/Family in Agreement with Plan yes    Plan Comments Spoke with patient at bedside.  She lives alone, is IADL, has a C-pap but does not use it.  She has used [facility] in the past and has been to [facility] for rehab.  PCP is [facility] and  pharmacy is [facility] on [address].  Patient drives, states neighbors would help her if needed.  She states she does not have steps at her house.  Emergency contact is her son Catalino [phone].  CCP will follow.  Karsten JIMENEZ                  Continued Care and Services - Admitted Since 9/9/2023    Coordination has not been started for this encounter.       Selected Continued Care - Prior Encounters Includes continued care and service providers with selected services from prior encounters from 6/11/2023 to 9/11/2023      Discharged on 8/31/2023 Admission date: 8/26/2023 - Discharge disposition: Home-Health Care c      Home Medical Care       Service Provider Selected Services Address Phone Fax Patient Preferred    [facility] [address]  [place] [address] [phone] --                          Expected Discharge Date and Time       Expected Discharge Date Expected Discharge Time    Sep 13, 2023            Demographic Summary       Row Name 09/11/23 1007       General Information    Admission Type inpatient    Arrived From home    Referral Source admission list    Reason for Consult discharge planning    Preferred Language English                   Functional Status       Row Name 09/11/23 1007       Functional Status    Usual Activity Tolerance moderate    Current Activity Tolerance moderate       Functional Status, IADL    Medications independent    Meal Preparation independent    Housekeeping independent    Laundry independent       Mental Status    General Appearance WDL X  Jaundiced       Mental Status Summary    Recent Changes in Mental Status/Cognitive Functioning no changes                          Becky S. [place], RN

## 2023-09-12 ENCOUNTER — APPOINTMENT (OUTPATIENT)
Dept: CT IMAGING | Facility: HOSPITAL | Age: 78
DRG: 872 | End: 2023-09-12
Payer: MEDICARE

## 2023-09-12 ENCOUNTER — APPOINTMENT (OUTPATIENT)
Dept: GENERAL RADIOLOGY | Facility: HOSPITAL | Age: 78
DRG: 872 | End: 2023-09-12
Payer: MEDICARE

## 2023-09-12 LAB
ABO GROUP BLD: NORMAL
ALBUMIN SERPL-MCNC: 2.2 G/DL (ref 3.5–5.2)
ALBUMIN/GLOB SERPL: 0.7 G/DL
ALP SERPL-CCNC: 618 U/L (ref 39–117)
ALT SERPL W P-5'-P-CCNC: 95 U/L (ref 1–33)
ANION GAP SERPL CALCULATED.3IONS-SCNC: 7 MMOL/L (ref 5–15)
AST SERPL-CCNC: 133 U/L (ref 1–32)
BACTERIA UR QL AUTO: ABNORMAL /HPF
BH BB BLOOD EXPIRATION DATE: NORMAL
BH BB BLOOD TYPE BARCODE: 600
BH BB DISPENSE STATUS: NORMAL
BH BB PRODUCT CODE: NORMAL
BH BB UNIT NUMBER: NORMAL
BILIRUB SERPL-MCNC: 5.6 MG/DL (ref 0–1.2)
BILIRUB UR QL STRIP: ABNORMAL
BLD GP AB SCN SERPL QL: NEGATIVE
BUN SERPL-MCNC: 13 MG/DL (ref 8–23)
BUN/CREAT SERPL: 21.7 (ref 7–25)
CALCIUM SPEC-SCNC: 7.8 MG/DL (ref 8.6–10.5)
CALCIUM SPEC-SCNC: 8.2 MG/DL (ref 8.6–10.5)
CHLORIDE SERPL-SCNC: 104 MMOL/L (ref 98–107)
CLARITY UR: CLEAR
CO2 SERPL-SCNC: 25 MMOL/L (ref 22–29)
COLOR UR: ABNORMAL
CREAT SERPL-MCNC: 0.6 MG/DL (ref 0.57–1)
CROSSMATCH INTERPRETATION: NORMAL
DEPRECATED RDW RBC AUTO: 48.1 FL (ref 37–54)
DEPRECATED RDW RBC AUTO: 52.6 FL (ref 37–54)
DEPRECATED RDW RBC AUTO: 55.4 FL (ref 37–54)
EGFRCR SERPLBLD CKD-EPI 2021: 90.9 ML/MIN/1.73
EOSINOPHIL # BLD MANUAL: 0.19 10*3/MM3 (ref 0–0.4)
EOSINOPHIL NFR BLD MANUAL: 1 % (ref 0.3–6.2)
ERYTHROCYTE [DISTWIDTH] IN BLOOD BY AUTOMATED COUNT: 14.7 % (ref 12.3–15.4)
ERYTHROCYTE [DISTWIDTH] IN BLOOD BY AUTOMATED COUNT: 15.7 % (ref 12.3–15.4)
ERYTHROCYTE [DISTWIDTH] IN BLOOD BY AUTOMATED COUNT: 15.8 % (ref 12.3–15.4)
FERRITIN SERPL-MCNC: 915 NG/ML (ref 13–150)
FOLATE SERPL-MCNC: 9.61 NG/ML (ref 4.78–24.2)
GLOBULIN UR ELPH-MCNC: 3 GM/DL
GLUCOSE SERPL-MCNC: 138 MG/DL (ref 65–99)
GLUCOSE UR STRIP-MCNC: NEGATIVE MG/DL
HCT VFR BLD AUTO: 21.6 % (ref 34–46.6)
HCT VFR BLD AUTO: 22.9 % (ref 34–46.6)
HCT VFR BLD AUTO: 27.5 % (ref 34–46.6)
HGB BLD-MCNC: 7.4 G/DL (ref 12–15.9)
HGB BLD-MCNC: 7.5 G/DL (ref 12–15.9)
HGB BLD-MCNC: 9.7 G/DL (ref 12–15.9)
HGB UR QL STRIP.AUTO: NEGATIVE
HYALINE CASTS UR QL AUTO: ABNORMAL /LPF
HYPOCHROMIA BLD QL: ABNORMAL
IRON 24H UR-MRATE: 36 MCG/DL (ref 37–145)
IRON SATN MFR SERPL: 20 % (ref 20–50)
KETONES UR QL STRIP: NEGATIVE
LEUKOCYTE ESTERASE UR QL STRIP.AUTO: ABNORMAL
LYMPHOCYTES # BLD MANUAL: 1.14 10*3/MM3 (ref 0.7–3.1)
LYMPHOCYTES NFR BLD MANUAL: 2 % (ref 5–12)
MAGNESIUM SERPL-MCNC: 1.8 MG/DL (ref 1.6–2.4)
MCH RBC QN AUTO: 31.1 PG (ref 26.6–33)
MCH RBC QN AUTO: 31.5 PG (ref 26.6–33)
MCH RBC QN AUTO: 32 PG (ref 26.6–33)
MCHC RBC AUTO-ENTMCNC: 32.8 G/DL (ref 31.5–35.7)
MCHC RBC AUTO-ENTMCNC: 34.3 G/DL (ref 31.5–35.7)
MCHC RBC AUTO-ENTMCNC: 35.3 G/DL (ref 31.5–35.7)
MCV RBC AUTO: 90.8 FL (ref 79–97)
MCV RBC AUTO: 91.9 FL (ref 79–97)
MCV RBC AUTO: 95 FL (ref 79–97)
MONOCYTES # BLD: 0.37 10*3/MM3 (ref 0.1–0.9)
NEUTROPHILS # BLD AUTO: 17.01 10*3/MM3 (ref 1.7–7)
NEUTROPHILS NFR BLD MANUAL: 90.9 % (ref 42.7–76)
NITRITE UR QL STRIP: NEGATIVE
PH UR STRIP.AUTO: 6 [PH] (ref 5–8)
PHOSPHATE SERPL-MCNC: 2.2 MG/DL (ref 2.5–4.5)
PLAT MORPH BLD: NORMAL
PLATELET # BLD AUTO: 373 10*3/MM3 (ref 140–450)
PLATELET # BLD AUTO: 378 10*3/MM3 (ref 140–450)
PLATELET # BLD AUTO: 403 10*3/MM3 (ref 140–450)
PMV BLD AUTO: 9.2 FL (ref 6–12)
PMV BLD AUTO: 9.4 FL (ref 6–12)
PMV BLD AUTO: 9.5 FL (ref 6–12)
POLYCHROMASIA BLD QL SMEAR: ABNORMAL
POTASSIUM SERPL-SCNC: 3.1 MMOL/L (ref 3.5–5.2)
POTASSIUM SERPL-SCNC: 3.4 MMOL/L (ref 3.5–5.2)
PROT SERPL-MCNC: 5.2 G/DL (ref 6–8.5)
PROT UR QL STRIP: ABNORMAL
RBC # BLD AUTO: 2.35 10*6/MM3 (ref 3.77–5.28)
RBC # BLD AUTO: 2.41 10*6/MM3 (ref 3.77–5.28)
RBC # BLD AUTO: 3.03 10*6/MM3 (ref 3.77–5.28)
RBC # UR STRIP: ABNORMAL /HPF
REF LAB TEST METHOD: ABNORMAL
RH BLD: NEGATIVE
SODIUM SERPL-SCNC: 136 MMOL/L (ref 136–145)
SP GR UR STRIP: 1.02 (ref 1–1.03)
SQUAMOUS #/AREA URNS HPF: ABNORMAL /HPF
T&S EXPIRATION DATE: NORMAL
TIBC SERPL-MCNC: 183 MCG/DL (ref 298–536)
TRANS CELLS #/AREA URNS HPF: ABNORMAL /HPF
TRANSFERRIN SERPL-MCNC: 123 MG/DL (ref 200–360)
UNIT  ABO: NORMAL
UNIT  RH: NORMAL
UROBILINOGEN UR QL STRIP: ABNORMAL
VARIANT LYMPHS NFR BLD MANUAL: 6.1 % (ref 19.6–45.3)
VIT B12 BLD-MCNC: 935 PG/ML (ref 211–946)
WBC # UR STRIP: ABNORMAL /HPF
WBC MORPH BLD: NORMAL
WBC NRBC COR # BLD: 18.33 10*3/MM3 (ref 3.4–10.8)
WBC NRBC COR # BLD: 18.71 10*3/MM3 (ref 3.4–10.8)
WBC NRBC COR # BLD: 19.64 10*3/MM3 (ref 3.4–10.8)

## 2023-09-12 PROCEDURE — 0 LIDOCAINE 1 % SOLUTION: Performed by: RADIOLOGY

## 2023-09-12 PROCEDURE — 25010000002 HYDROMORPHONE PER 4 MG: Performed by: INTERNAL MEDICINE

## 2023-09-12 PROCEDURE — 86900 BLOOD TYPING SEROLOGIC ABO: CPT | Performed by: INTERNAL MEDICINE

## 2023-09-12 PROCEDURE — 99232 SBSQ HOSP IP/OBS MODERATE 35: CPT | Performed by: INTERNAL MEDICINE

## 2023-09-12 PROCEDURE — 84100 ASSAY OF PHOSPHORUS: CPT | Performed by: INTERNAL MEDICINE

## 2023-09-12 PROCEDURE — 86850 RBC ANTIBODY SCREEN: CPT | Performed by: INTERNAL MEDICINE

## 2023-09-12 PROCEDURE — 49406 IMAGE CATH FLUID PERI/RETRO: CPT

## 2023-09-12 PROCEDURE — 85007 BL SMEAR W/DIFF WBC COUNT: CPT | Performed by: INTERNAL MEDICINE

## 2023-09-12 PROCEDURE — C1729 CATH, DRAINAGE: HCPCS

## 2023-09-12 PROCEDURE — 84132 ASSAY OF SERUM POTASSIUM: CPT | Performed by: INTERNAL MEDICINE

## 2023-09-12 PROCEDURE — 82607 VITAMIN B-12: CPT | Performed by: INTERNAL MEDICINE

## 2023-09-12 PROCEDURE — 87077 CULTURE AEROBIC IDENTIFY: CPT | Performed by: SURGERY

## 2023-09-12 PROCEDURE — 74022 RADEX COMPL AQT ABD SERIES: CPT

## 2023-09-12 PROCEDURE — P9016 RBC LEUKOCYTES REDUCED: HCPCS

## 2023-09-12 PROCEDURE — 87070 CULTURE OTHR SPECIMN AEROBIC: CPT | Performed by: SURGERY

## 2023-09-12 PROCEDURE — 87075 CULTR BACTERIA EXCEPT BLOOD: CPT | Performed by: SURGERY

## 2023-09-12 PROCEDURE — 86900 BLOOD TYPING SEROLOGIC ABO: CPT

## 2023-09-12 PROCEDURE — 87205 SMEAR GRAM STAIN: CPT | Performed by: SURGERY

## 2023-09-12 PROCEDURE — 87081 CULTURE SCREEN ONLY: CPT | Performed by: SURGERY

## 2023-09-12 PROCEDURE — 82746 ASSAY OF FOLIC ACID SERUM: CPT | Performed by: INTERNAL MEDICINE

## 2023-09-12 PROCEDURE — 83540 ASSAY OF IRON: CPT | Performed by: INTERNAL MEDICINE

## 2023-09-12 PROCEDURE — 85027 COMPLETE CBC AUTOMATED: CPT | Performed by: INTERNAL MEDICINE

## 2023-09-12 PROCEDURE — 75989 ABSCESS DRAINAGE UNDER X-RAY: CPT

## 2023-09-12 PROCEDURE — 25010000002 HYDROMORPHONE PER 4 MG: Performed by: RADIOLOGY

## 2023-09-12 PROCEDURE — 81001 URINALYSIS AUTO W/SCOPE: CPT | Performed by: INTERNAL MEDICINE

## 2023-09-12 PROCEDURE — 36430 TRANSFUSION BLD/BLD COMPNT: CPT

## 2023-09-12 PROCEDURE — 84466 ASSAY OF TRANSFERRIN: CPT | Performed by: INTERNAL MEDICINE

## 2023-09-12 PROCEDURE — 83735 ASSAY OF MAGNESIUM: CPT | Performed by: INTERNAL MEDICINE

## 2023-09-12 PROCEDURE — 86923 COMPATIBILITY TEST ELECTRIC: CPT

## 2023-09-12 PROCEDURE — 80053 COMPREHEN METABOLIC PANEL: CPT | Performed by: INTERNAL MEDICINE

## 2023-09-12 PROCEDURE — 0F9430Z DRAINAGE OF GALLBLADDER WITH DRAINAGE DEVICE, PERCUTANEOUS APPROACH: ICD-10-PCS | Performed by: RADIOLOGY

## 2023-09-12 PROCEDURE — 82310 ASSAY OF CALCIUM: CPT | Performed by: INTERNAL MEDICINE

## 2023-09-12 PROCEDURE — 86901 BLOOD TYPING SEROLOGIC RH(D): CPT | Performed by: INTERNAL MEDICINE

## 2023-09-12 PROCEDURE — 99231 SBSQ HOSP IP/OBS SF/LOW 25: CPT | Performed by: SURGERY

## 2023-09-12 PROCEDURE — 87186 SC STD MICRODIL/AGAR DIL: CPT | Performed by: SURGERY

## 2023-09-12 PROCEDURE — 82728 ASSAY OF FERRITIN: CPT | Performed by: INTERNAL MEDICINE

## 2023-09-12 PROCEDURE — 25010000002 PIPERACILLIN SOD-TAZOBACTAM PER 1 G: Performed by: NURSE PRACTITIONER

## 2023-09-12 PROCEDURE — 85025 COMPLETE CBC W/AUTO DIFF WBC: CPT | Performed by: INTERNAL MEDICINE

## 2023-09-12 RX ORDER — HYDROMORPHONE HYDROCHLORIDE 1 MG/ML
0.5 INJECTION, SOLUTION INTRAMUSCULAR; INTRAVENOUS; SUBCUTANEOUS
Status: DISPENSED | OUTPATIENT
Start: 2023-09-12 | End: 2023-09-19

## 2023-09-12 RX ORDER — TRAMADOL HYDROCHLORIDE 50 MG/1
50 TABLET ORAL EVERY 4 HOURS PRN
Status: ACTIVE | OUTPATIENT
Start: 2023-09-12 | End: 2023-09-19

## 2023-09-12 RX ORDER — HYDROMORPHONE HYDROCHLORIDE 1 MG/ML
0.5 INJECTION, SOLUTION INTRAMUSCULAR; INTRAVENOUS; SUBCUTANEOUS ONCE
Status: COMPLETED | OUTPATIENT
Start: 2023-09-12 | End: 2023-09-12

## 2023-09-12 RX ORDER — POTASSIUM CHLORIDE 750 MG/1
40 TABLET, FILM COATED, EXTENDED RELEASE ORAL EVERY 4 HOURS
Status: COMPLETED | OUTPATIENT
Start: 2023-09-12 | End: 2023-09-13

## 2023-09-12 RX ORDER — LIDOCAINE HYDROCHLORIDE 10 MG/ML
20 INJECTION, SOLUTION INFILTRATION; PERINEURAL ONCE
Status: COMPLETED | OUTPATIENT
Start: 2023-09-12 | End: 2023-09-12

## 2023-09-12 RX ORDER — IBUPROFEN 400 MG/1
400 TABLET ORAL ONCE
Status: COMPLETED | OUTPATIENT
Start: 2023-09-12 | End: 2023-09-12

## 2023-09-12 RX ADMIN — LIDOCAINE HYDROCHLORIDE 20 ML: 10 INJECTION, SOLUTION INFILTRATION; PERINEURAL at 17:59

## 2023-09-12 RX ADMIN — CETIRIZINE HYDROCHLORIDE 10 MG: 10 TABLET ORAL at 08:58

## 2023-09-12 RX ADMIN — PIPERACILLIN SODIUM AND TAZOBACTAM SODIUM 3.38 G: 3; .375 INJECTION, SOLUTION INTRAVENOUS at 06:34

## 2023-09-12 RX ADMIN — SODIUM CHLORIDE 125 ML/HR: 9 INJECTION, SOLUTION INTRAVENOUS at 05:18

## 2023-09-12 RX ADMIN — PIPERACILLIN SODIUM AND TAZOBACTAM SODIUM 3.38 G: 3; .375 INJECTION, SOLUTION INTRAVENOUS at 22:00

## 2023-09-12 RX ADMIN — PANTOPRAZOLE SODIUM 40 MG: 40 INJECTION, POWDER, FOR SOLUTION INTRAVENOUS at 08:59

## 2023-09-12 RX ADMIN — DESVENLAFAXINE SUCCINATE 50 MG: 50 TABLET, EXTENDED RELEASE ORAL at 08:58

## 2023-09-12 RX ADMIN — HYDROMORPHONE HYDROCHLORIDE 0.5 MG: 1 INJECTION, SOLUTION INTRAMUSCULAR; INTRAVENOUS; SUBCUTANEOUS at 15:21

## 2023-09-12 RX ADMIN — PIPERACILLIN SODIUM AND TAZOBACTAM SODIUM 3.38 G: 3; .375 INJECTION, SOLUTION INTRAVENOUS at 15:20

## 2023-09-12 RX ADMIN — IBUPROFEN 400 MG: 400 TABLET, FILM COATED ORAL at 01:38

## 2023-09-12 RX ADMIN — PANTOPRAZOLE SODIUM 40 MG: 40 INJECTION, POWDER, FOR SOLUTION INTRAVENOUS at 20:26

## 2023-09-12 RX ADMIN — HYDROMORPHONE HYDROCHLORIDE 0.5 MG: 1 INJECTION, SOLUTION INTRAMUSCULAR; INTRAVENOUS; SUBCUTANEOUS at 16:43

## 2023-09-12 RX ADMIN — Medication 2 PACKET: at 21:59

## 2023-09-12 RX ADMIN — POTASSIUM CHLORIDE 40 MEQ: 750 TABLET, EXTENDED RELEASE ORAL at 21:58

## 2023-09-12 NOTE — PROGRESS NOTES
[facility]  Inpatient Progress Note    Reason for Follow Up: Pancreas mass, cholecystitis and now GI bleed    Subjective     Interval History:   Patient denies further bleeding from rectum, there was 1 episode last night    Current Facility-Administered Medications:     aluminum-magnesium hydroxide-simethicone (MAALOX MAX) 400-400-40 MG/5ML suspension 15 mL, 15 mL, Oral, Q6H PRN, Sussy Crawley APRN    Calcium Replacement - Follow Nurse / BPA Driven Protocol, , Does not apply, PRN, Ankur Lehman MD    cetirizine (zyrTEC) tablet 10 mg, 10 mg, Oral, Daily, Ankur Lehman MD, 10 mg at 09/12/23 0858    desvenlafaxine (PRISTIQ) 24 hr tablet 50 mg, 50 mg, Oral, Daily, Ankur Lehman MD, 50 mg at 09/12/23 0858    hydrOXYzine (ATARAX) tablet 25 mg, 25 mg, Oral, TID PRN, Ankur Lehman MD    lactulose (CHRONULAC) 10 GM/15ML solution 10 g, 10 g, Oral, BID PRN, Ankur Lehman MD    Magnesium Standard Dose Replacement - Follow Nurse / BPA Driven Protocol, , Does not apply, PRN, Ankur Lehman MD    melatonin tablet 3 mg, 3 mg, Oral, Nightly PRN, Sussy Crawley APRN    nitroglycerin (NITROSTAT) SL tablet 0.4 mg, 0.4 mg, Sublingual, Q5 Min PRN, Sussy Crawley APRN    ondansetron (ZOFRAN) tablet 4 mg, 4 mg, Oral, Q6H PRN **OR** ondansetron (ZOFRAN) injection 4 mg, 4 mg, Intravenous, Q6H PRN, Sussy Crawley APRN    pantoprazole (PROTONIX) injection 40 mg, 40 mg, Intravenous, Q12H, Ankur Lehman MD, 40 mg at 09/12/23 0859    Phosphorus Replacement - Follow Nurse / BPA Driven Protocol, , Does not apply, PRN, Ankur Lehman MD    piperacillin-tazobactam (ZOSYN) 3.375 g in iso-osmotic dextrose 50 ml (premix), 3.375 g, Intravenous, Q8H, Sussy Crawley, APRN, 3.375 g at 09/12/23 0634    Potassium Replacement - Follow Nurse / BPA Driven Protocol, , Does not apply, PRN, Ankur Lehman MD    sodium chloride 0.9 % flush 10 mL, 10 mL,  Intravenous, PRN, Sussy Crawley APRN, 10 mL at 09/11/23 2112    sodium chloride 0.9 % infusion, 125 mL/hr, Intravenous, Continuous, Edwin Ware MD, Last Rate: 125 mL/hr at 09/12/23 0518, 125 mL/hr at 09/12/23 0518    Facility-Administered Medications Ordered in Other Encounters:     Chlorhexidine Gluconate 2 % pads 2 each, 2 pad , Apply externally, BID, Krish Ramirez MD  Review of Systems:    There is weakness and fatigue all other systems reviewed and negative    Objective     Vital Signs  Temp:  [97.3 °F (36.3 °C)-101.7 °F (38.7 °C)] 97.3 °F (36.3 °C)  Heart Rate:  [] 79  Resp:  [16-20] 18  BP: (103-142)/(57-76) 142/63  Body mass index is 20.88 kg/m².    Intake/Output Summary (Last 24 hours) at 9/12/2023 1312  Last data filed at 9/12/2023 1221  Gross per 24 hour   Intake 4636.75 ml   Output 800 ml   Net 3836.75 ml     I/O this shift:  In: 463.8 [Blood:463.8]  Out: 400 [Urine:400]     Physical Exam:   General: patient awake, alert and cooperative   Eyes: Normal lids and lashes, no scleral icterus   Neck: supple, normal ROM   Skin: warm and dry, not jaundiced   Cardiovascular: regular rhythm and rate, no murmurs auscultated   Pulm: clear to auscultation bilaterally, regular and unlabored   Abdomen: soft, nontender, nondistended; normal bowel sounds   Extremities: no rash or edema   Psychiatric: Normal mood and behavior; memory intact     Results Review:     I reviewed the patient's new clinical results.    Results from last 7 days   Lab Units 09/12/23 0318 09/11/23  2357 09/11/23  1841   WBC 10*3/mm3 18.71* 19.64* 17.67*   HEMOGLOBIN g/dL 7.5* 7.4* 8.0*   HEMATOCRIT % 22.9* 21.6* 23.7*   PLATELETS 10*3/mm3 403 378 380     Results from last 7 days   Lab Units 09/12/23  0318 09/11/23  0640 09/10/23  1812   SODIUM mmol/L 136 138 131*   POTASSIUM mmol/L 3.1* 3.7 3.9   CHLORIDE mmol/L 104 103 99   CO2 mmol/L 25.0 26.8 19.9*   BUN mg/dL 13 12 14   CREATININE mg/dL 0.60 0.56* 0.59   CALCIUM mg/dL 7.8*  8.5* 8.4*   BILIRUBIN mg/dL 5.6* 6.1* 5.6*   ALK PHOS U/L 618* 269* 146*   ALT (SGPT) U/L 95* 88* 88*   AST (SGOT) U/L 133* 104* 100*   GLUCOSE mg/dL 138* 123* 120*     Results from last 7 days   Lab Units 09/11/23  0640 09/09/23  2156   INR  1.16* 1.13*     Lab Results   Lab Value Date/Time    LIPASE 148 (H) 09/09/2023 2156    LIPASE 108 (H) 08/28/2023 0458       Radiology:  CT Abdomen Pelvis With Contrast   Final Result          Assessment & Plan     Active Hospital Problems    Diagnosis     **Severe sepsis     Pancreatic mass     Mixed hyperlipidemia     Benign essential hypertension        Assessment:  Elevated bilirubin with history of sphincterotomy and Wallstent placement: Actually no current evidence of ductal dilation and consistent with good decompression.  Elevated transaminase levels  Possible pancreatic neoplasm  Maroon stool last night        Plan:  Can monitor LFTs for the present, see no reason for GI intervention at this time with ductal decompression achieved.  There is significant gallbladder wall thickening consistent with acute cholecystitis  General surgery is following plan for cholecystectomy in the near future  Eventually will need biopsy of pancreas mass  She is hemodynamically stable, no GI intervention needed today   IV fluids 125 cc an hour  Serial hemoglobins every 8, transfusion parameters written  Orr diet as tolerated  Protonix IV every 12 ordered  I spoke with Dr. Mg he says this  far out from sphincterotomy it is unlikely since the patient is not on anticoagulants or antiplatelets that the bleeding is from the sphincterotomy site  Consider EGD and colonoscopy in the future depending on her clinical course  No active bleeding at this time, hemoglobin relatively stable we will follow       I discussed the patients findings and my recommendations with patient and nursing staff.    Edwin Ware MD

## 2023-09-12 NOTE — PROGRESS NOTES
Name: Lizzie Bose ADMIT: 2023   : 1945  PCP: Ct Alford APRN    MRN: [id number] LOS: 3 days   AGE/SEX: 78 y.o. female  ROOM: [facility]     Subjective   Subjective   Chief Complaint   Patient presents with    Abdominal Pain    Jaundice     No CP SOA. No NV.  Had dark-colored diarrhea yesterday afternoon.  She is not reporting any abdominal pain currently.     Objective   Objective   Vital Signs  Temp:  [97.3 °F (36.3 °C)-101.7 °F (38.7 °C)] 97.3 °F (36.3 °C)  Heart Rate:  [] 79  Resp:  [16-20] 18  BP: (103-142)/(57-76) 142/63  SpO2:  [96 %-100 %] 100 %  on   ;   Device (Oxygen Therapy): room air  Body mass index is 20.88 kg/m².    Physical Exam  Vitals and nursing note reviewed.   Constitutional:       General: She is not in acute distress.     Appearance: She is ill-appearing. She is not diaphoretic.   HENT:      Head: Atraumatic.   Eyes:      General: Scleral icterus present.   Cardiovascular:      Rate and Rhythm: Normal rate and regular rhythm.      Pulses: Normal pulses.   Pulmonary:      Effort: Pulmonary effort is normal.      Breath sounds: No wheezing.   Abdominal:      General: There is no distension.      Palpations: Abdomen is soft.      Tenderness: There is no abdominal tenderness. There is no guarding or rebound.   Musculoskeletal:         General: No swelling or tenderness.   Skin:     General: Skin is warm and dry.      Coloration: Skin is jaundiced.   Neurological:      Mental Status: She is alert. Mental status is at baseline.   Psychiatric:         Mood and Affect: Mood normal.         Behavior: Behavior normal.     Results Review  I reviewed the patient's new clinical results.    Results from last 7 days   Lab Units 23  2357 23  1841 23  0640   WBC 10*3/mm3 18.71* 19.64* 17.67* 19.63*   HEMOGLOBIN g/dL 7.5* 7.4* 8.0* 8.6*   PLATELETS 10*3/mm3 403 378 380 387       Results from last 7 days   Lab Units 23  0640  09/10/23  1812 09/09/23  2156   SODIUM mmol/L 136 138 131* 132*   POTASSIUM mmol/L 3.1* 3.7 3.9 4.0   CHLORIDE mmol/L 104 103 99 95*   CO2 mmol/L 25.0 26.8 19.9* 19.7*   BUN mg/dL 13 12 14 27*   CREATININE mg/dL 0.60 0.56* 0.59 0.96   GLUCOSE mg/dL 138* 123* 120* 158*   EGFR mL/min/1.73 90.9 92.4 91.2 59.9*       Results from last 7 days   Lab Units 09/12/23  0318 09/11/23  0640 09/10/23  1812 09/09/23  2156   ALBUMIN g/dL 2.2* 2.6* 2.1* 3.1*   BILIRUBIN mg/dL 5.6* 6.1* 5.6* 9.0*   ALK PHOS U/L 618* 269* 146* 185*   AST (SGOT) U/L 133* 104* 100* 159*   ALT (SGPT) U/L 95* 88* 88* 142*       Results from last 7 days   Lab Units 09/12/23  0318 09/11/23  0640 09/10/23  1812 09/09/23  2156   CALCIUM mg/dL 7.8* 8.5* 8.4* 10.0   ALBUMIN g/dL 2.2* 2.6* 2.1* 3.1*       Results from last 7 days   Lab Units 09/10/23  0701 09/10/23  0359 09/10/23  0054 09/09/23  2156   LACTATE mmol/L 1.8 2.1* 2.7* 3.5*       No results found for: HGBA1C, POCGLU    No radiology results for the last day    I have personally reviewed all medications:  Scheduled Medications  azithromycin, 500 mg, Intravenous, Q24H  cetirizine, 10 mg, Oral, Daily  desvenlafaxine, 50 mg, Oral, Daily  pantoprazole, 40 mg, Intravenous, Q12H  piperacillin-tazobactam, 3.375 g, Intravenous, Q8H      Infusions  sodium chloride, 125 mL/hr, Last Rate: 125 mL/hr (09/12/23 0518)      Diet  No diet orders on file    I have personally reviewed:  [x]  Laboratory   [x]  Microbiology   []  Radiology   []  EKG/Telemetry  []  Cardiology/Vascular   []  Pathology    []  Records       Assessment/Plan     Active Hospital Problems    Diagnosis  POA    **Severe sepsis [A41.9, R65.20]  Yes    Pancreatic mass [K86.89]  Yes    Mixed hyperlipidemia [E78.2]  Yes    Benign essential hypertension [I10]  Yes      Resolved Hospital Problems   No resolved problems to display.       78 y.o. female admitted with Severe sepsis.    Cholecystitis: Blood cultures are negative. Continue zosyn.   Cholecystostomy planned.  Eventual cholecystectomy. Surgery and GI following.  EPEC diarrhea: Will give azithromycin for treatment  Sepsis: Lactic acidosis improved. Fever last night. Monitoring.  Pancreatic Mass/Obstructive Jaundice: Stent placement last admission. This is presumed pancreatic cancer with high ca19-9 and brushing suspicious for malignancy but not diagnostic. EUS will be needed. Oncology consult.  GIB: Single bloody bowel movement. PPI. Serial HH. Transfuse if needed.  HTN: Acceptable acutely.  HLD: Statin allergy  PPX: SCD  Disposition: TBD    Expected Discharge Date: 9/13/2023; Expected Discharge Time:      Ankur Lehman MD  [facility]  09/12/23  14:14 EDT    Dictated portions of note using Dragon dictation software.  Copied text in this note has been reviewed by me and remains accurate as of 09/12/23

## 2023-09-12 NOTE — PROGRESS NOTES
Follow-up jaundice, abnormal appearance of the gallbladder    Subjective:  Overall continues to feel poorly, is having some abdominal pain, fairly mild.    Objective:  Maximum temperature 101.7 yesterday, current 97.3 heart rate 70s to 80s blood pressure 142/63  General: Awake and alert, appears uncomfortable, chronic ill appearance  Eyes: There is significant scleral icterus, extraocular movements intact  Abdomen: Soft and nondistended, there is some mild right upper quadrant tenderness without guarding    Labs are reviewed, white blood cell count roughly the same range at 18.7 today, hemoglobin 7.5, 90% neutrophils, bilirubin in the same range at 5.6, LFTs otherwise unremarkable, albumin low at 2.2    Assessment and plan:  -Obstructive jaundice, status post stent placement  -Probable acute cholecystitis although clinical exam remains fairly benign, the fever yesterday, persistent leukocytosis and distinctly abnormal appearance of the CT are all suggestive of ongoing cholecystitis  -We will plan for cholecystostomy drainage for now, may eventually require cholecystectomy  -Also needs endoscopic ultrasound and biopsy of pancreatic mass, unfortunately this will likely need to be done as an outpatient outside of this facility, we will try to arrange with Dr. Morfin.    Chase Nur MD  General and Endoscopic Surgery  [facility]    [address], [address]  [place], [place], [address]  P: [phone]  F: [phone]

## 2023-09-12 NOTE — NURSING NOTE
RN called lab to verify if the type and screen was completed for the pt yet.  lisa Madrid, stated that is has not been drawn and that she will send someone up to draw the lab.  Will continue to monitor.

## 2023-09-12 NOTE — PROGRESS NOTES
Subjective     CHIEF COMPLAINT:     Pancreatic mass    INTERVAL HISTORY:     Patient reports worsening abdominal pain today.  She reports that her status has significantly changed today.    REVIEW OF SYSTEMS:  A comprehensive review of systems was obtained with pertinent positive findings as noted in the interval history above.  All other systems negative.    SCHEDULED MEDS:  cetirizine, 10 mg, Oral, Daily  desvenlafaxine, 50 mg, Oral, Daily  pantoprazole, 40 mg, Intravenous, Q12H  piperacillin-tazobactam, 3.375 g, Intravenous, Q8H      INFUSIONS:  sodium chloride, 125 mL/hr, Last Rate: 125 mL/hr (09/12/23 0518)      Objective   VITAL SIGNS:  Temp:  [97.3 °F (36.3 °C)-101.7 °F (38.7 °C)] 97.3 °F (36.3 °C)  Heart Rate:  [] 79  Resp:  [16-20] 18  BP: (103-142)/(57-76) 142/63     PHYSICAL EXAMINATION:  GENERAL:  The patient appears in weak, not in acute distress.  She is in pain.  SKIN: No skin rash. No ecchymosis.   HEAD:  Normocephalic.  EYES:  Jaundice. No Pallor.   NECK:  Supple. No Masses.  LYMPHATICS:  No cervical or supraclavicular lymphadenopathy.  CHEST: Normal respiratory effort. Lungs clear to auscultation.   CARDIAC:  Normal S1 & S2. No murmur.   ABDOMEN: Tenderness in the right upper quadrant and epigastric areas. No Hepatomegaly. No Splenomegaly.   EXTREMITIES:  No edema.  PSYCH: Normal mood and affect.     RESULT REVIEW:   Results from last 7 days   Lab Units 09/12/23  0318 09/11/23  2357 09/11/23  1841 09/11/23  0640 09/10/23  1943/23  2156   WBC 10*3/mm3 18.71* 19.64* 17.67* 19.63* 19.58* 23.57*   NEUTROS ABS 10*3/mm3 17.01*  --  16.26* 17.27* 16.96* 20.07*   LYMPHS ABS 10*3/mm3 1.14  --  1.06 0.79 0.61*  --    HEMOGLOBIN g/dL 7.5* 7.4* 8.0* 8.6* 8.7* 11.9*   HEMATOCRIT % 22.9* 21.6* 23.7* 25.4* 25.8* 35.0   PLATELETS 10*3/mm3 403 378 380 387 373 505*     Results from last 7 days   Lab Units 09/12/23  0318 09/11/23  0640 09/10/23  1812 09/09/23  2156   SODIUM mmol/L 136 138 131* 132*    POTASSIUM mmol/L 3.1* 3.7 3.9 4.0   CHLORIDE mmol/L 104 103 99 95*   CO2 mmol/L 25.0 26.8 19.9* 19.7*   BUN mg/dL 13 12 14 27*   CREATININE mg/dL 0.60 0.56* 0.59 0.96   CALCIUM mg/dL 7.8* 8.5* 8.4* 10.0   ALBUMIN g/dL 2.2* 2.6* 2.1* 3.1*   BILIRUBIN mg/dL 5.6* 6.1* 5.6* 9.0*   ALK PHOS U/L 618* 269* 146* 185*   ALT (SGPT) U/L 95* 88* 88* 142*   AST (SGOT) U/L 133* 104* 100* 159*     Results from last 7 days   Lab Units 09/11/23  0640 09/09/23  2156   INR  1.16* 1.13*   APTT seconds  --  28.4         Lab 09/12/23  0319 09/12/23  0318   IRON  --  36*   IRON SATURATION (TSAT)  --  20   TIBC  --  183*   TRANSFERRIN  --  123*   FERRITIN  --  915.00*   FOLATE 9.61  --    VITAMIN B 12 935  --         Assessment & Plan   *Suspected pancreatic mass on CT on 8/26/2023.  Patient had biliary obstruction with severe intra and extrahepatic biliary ductal dilatation.  The common bile duct was abruptly decompressed.  This was considered to be concerning for pancreatic malignancy.  MRI abdomen on 8/27/2023 revealed a hypoenhancing pancreatic mass within the uncinate process and pancreatic head resulting in obstruction of the common bile duct and severe upstream intra and extrahepatic biliary ductal dilatation.  S/p ERCP with biliary duct stent placement on 8/29/2023.  Cytology from 8/29/2023 revealed scattered atypical ductal/glandular cells suspicious for well-differentiated neoplasm.  This was considered nondiagnostic of neoplasm.  CT on 9/10/2023 revealed interval placement of the biliary stent with resolution of the biliary duct dilatation. No pancreatic mass was identified.     *Left ovarian/adnexal cyst.  It was first identified on CT on 8/26/2023 measuring 5.7 cm.  Ultrasound on 8/30/2023 showed the lesion to have suspicious features.  There was endometrial thickening.  CT on 9/10/2023 showed the left adnexal lesion to be stable     *Anemia.  9/9/2023: Hemoglobin 11.9.    9/11/2023: Hemoglobin decreased to 8.6.  9/12/2023:  Hemoglobin decreased to 7.5.  Patient is reporting melena.  Anemia work-up showed no evidence of iron vitamin B12 or folate deficiency.     PLAN:     1.  Transfuse 1 unit PRBCs.  2.  H&H being monitored every 8 hours.  3.  Since the cytology exam was nondiagnostic malignancy, I recommended obtaining additional tissue for diagnosis.discussed with surgery.  Intraoperative biopsy is not feasible.  Plan is for EUS and biopsy after she recovers from her acute cholecystitis.      Discussed with family at bedside.      Kate Mar MD  09/12/23

## 2023-09-12 NOTE — PROGRESS NOTES
"Nutrition Services    Patient Name:  Lizzie Bose  YOB: 1945  MRN: [id number]  Admit Date:  9/9/2023    FOLLOW UP - CLINICAL NUTRITION    Assessment Date:  09/12/23    Encounter Information         Current Summary Transfusing 1 unit PRBCs today.  1 maroon stool yesterday evening.  Possible EGD and Cscope in the near future depending on clinical course.  Distended gallbladder, jaundice, pancreatic mass - brushing highly suggestive of primary pancreatic malignancy.  CT with peritoneal drain placement today.  Currently NPO.  Follow.     Current Nutrition Orders & Evaluation of Intake       Oral Nutrition     Current PO Diet NPO Diet NPO Type: Sips with Meds, Ice Chips   Supplement n/a   PO Evaluation     PO Intake % 50% x 2 meals    Factors Affecting Intake  Other:currently NPO for CT guided drain placement   --  Anthropometrics          Height    Weight Height: 165.1 cm (65\")  Weight: 56.9 kg (125 lb 8 oz) (09/10/23 0308)    BMI kg/m2 Body mass index is 20.88 kg/m².  Normal/Healthy (18.4 - 24.9)    Weight trend Loss     Labs        Pertinent Labs Reviewed, listed below     Results from last 7 days   Lab Units 09/12/23  0318 09/11/23  0640 09/10/23  1812   SODIUM mmol/L 136 138 131*   POTASSIUM mmol/L 3.1* 3.7 3.9   CHLORIDE mmol/L 104 103 99   CO2 mmol/L 25.0 26.8 19.9*   BUN mg/dL 13 12 14   CREATININE mg/dL 0.60 0.56* 0.59   CALCIUM mg/dL 7.8* 8.5* 8.4*   BILIRUBIN mg/dL 5.6* 6.1* 5.6*   ALK PHOS U/L 618* 269* 146*   ALT (SGPT) U/L 95* 88* 88*   AST (SGOT) U/L 133* 104* 100*   GLUCOSE mg/dL 138* 123* 120*     Results from last 7 days   Lab Units 09/12/23  1557 09/12/23  0318   HEMOGLOBIN g/dL 9.7* 7.5*   HEMATOCRIT % 27.5* 22.9*   WBC 10*3/mm3 18.33* 18.71*   ALBUMIN g/dL  --  2.2*     Results from last 7 days   Lab Units 09/12/23  1557 09/12/23  0318 09/11/23  2357 09/11/23  1841 09/11/23  0640 09/10/23  1943/23 2156   INR   --   --   --   --  1.16*  --  1.13*   APTT seconds  --   --   --   " --   --   --  28.4   PLATELETS 10*3/mm3 373 403 378 380 387   < > 505*    < > = values in this interval not displayed.     COVID19   Date Value Ref Range Status   07/10/2020 Not Detected Not Detected - Ref. Range Final     Lab Results   Component Value Date    HGBA1C 6.0 (H) 11/03/2021          Medications            Scheduled Medications azithromycin, 500 mg, Intravenous, Q24H  cetirizine, 10 mg, Oral, Daily  desvenlafaxine, 50 mg, Oral, Daily  pantoprazole, 40 mg, Intravenous, Q12H  piperacillin-tazobactam, 3.375 g, Intravenous, Q8H        Infusions      PRN Medications   aluminum-magnesium hydroxide-simethicone    Calcium Replacement - Follow Nurse / BPA Driven Protocol    HYDROmorphone    hydrOXYzine    lactulose    Magnesium Standard Dose Replacement - Follow Nurse / BPA Driven Protocol    melatonin    nitroglycerin    ondansetron **OR** ondansetron    Phosphorus Replacement - Follow Nurse / BPA Driven Protocol    Potassium Replacement - Follow Nurse / BPA Driven Protocol    sodium chloride    traMADol     Physical Findings          General Appearance alert, oriented, room air   Oral/Mouth Cavity tooth or teeth missing   Edema  lower extremity , 1+ (trace)   Gastrointestinal fecal incontinence, hypoactive bowel sounds, last bowel movement: 9/12   Skin  bruising   Tubes/Drains/Lines none   NFPE Not indicated at this time   --  NUTRITION INTERVENTION / PLAN OF CARE  Intervention Goal         Intervention Goal(s) Maintain nutrition status, Reduce/improve symptoms, Disease management/therapy, Initiate feeding/diet, and Maintain weight     Nutrition Intervention         RD Action Await initiation of PO diet, Continue to monitor, and Care plan reviewed     Nutrition Prescription         Diet Prescription     Supplement Prescription n/a   EN/PN Prescription    New Prescription Ordered? No changes at this time   --  Monitor/Evaluation        Monitor Per protocol, I&O, Pertinent labs, Weight, GI status, Symptoms    Discharge Needs Pending clinical course   Education Will instruct as appropriate   --    RD to follow up per protocol.    Electronically signed by:  Karely Samayoa RD  09/12/23 16:59 EDT

## 2023-09-12 NOTE — PLAN OF CARE
Goal Outcome Evaluation:  Plan of Care Reviewed With: patient        Progress: no change  Outcome Evaluation: vitals stable.  pt denied pain.  meds given per orders.  transfusing 1 unit of PRBCs.  will continue to monitor.

## 2023-09-12 NOTE — PROGRESS NOTES
[facility]  Inpatient Progress Note    Reason for Follow Up: Cholecystitis, abnormal liver test, pancreas mass    Subjective     Interval History:   Called that 1600 hrs. for maroon stool  Patient endorses 1 maroon stool this evening  There is no dizziness, syncope short of air or chest pain      Current Facility-Administered Medications:     aluminum-magnesium hydroxide-simethicone (MAALOX MAX) 400-400-40 MG/5ML suspension 15 mL, 15 mL, Oral, Q6H PRN, Sussy Crawley APRN    cetirizine (zyrTEC) tablet 10 mg, 10 mg, Oral, Daily, Ankur Lehman MD, 10 mg at 09/10/23 1700    desvenlafaxine (PRISTIQ) 24 hr tablet 50 mg, 50 mg, Oral, Daily, Ankur Lehman MD, 50 mg at 09/10/23 2018    hydrOXYzine (ATARAX) tablet 25 mg, 25 mg, Oral, TID PRN, Ankur Lehman MD    lactulose (CHRONULAC) 10 GM/15ML solution 10 g, 10 g, Oral, BID PRN, Ankur Lehman MD    melatonin tablet 3 mg, 3 mg, Oral, Nightly PRN, Sussy Crawley APRN    nitroglycerin (NITROSTAT) SL tablet 0.4 mg, 0.4 mg, Sublingual, Q5 Min PRN, Sussy Crawley APRN    ondansetron (ZOFRAN) tablet 4 mg, 4 mg, Oral, Q6H PRN **OR** ondansetron (ZOFRAN) injection 4 mg, 4 mg, Intravenous, Q6H PRN, Sussy Crawley APRN    pantoprazole (PROTONIX) injection 40 mg, 40 mg, Intravenous, Q12H, Ankur Lehman MD    piperacillin-tazobactam (ZOSYN) 3.375 g in iso-osmotic dextrose 50 ml (premix), 3.375 g, Intravenous, Q8H, Sussy Crawley APRN, 3.375 g at 09/11/23 1453    sodium chloride 0.9 % flush 10 mL, 10 mL, Intravenous, PRN, Sussy Crawley APRN    sodium chloride 0.9 % infusion, 125 mL/hr, Intravenous, Continuous, Edwin Ware MD, Stopped at 09/11/23 1425    Facility-Administered Medications Ordered in Other Encounters:     Chlorhexidine Gluconate 2 % pads 2 each, 2 pad , Apply externally, BID, Krish Ramirez MD  Review of Systems:    There is weakness and fatigue all other systems reviewed and  negative    Objective     Vital Signs  Temp:  [98.6 °F (37 °C)-99.6 °F (37.6 °C)] 98.6 °F (37 °C)  Heart Rate:  [85-96] 90  Resp:  [16-18] 16  BP: (111-121)/(66-74) 111/66  Body mass index is 20.88 kg/m².    Intake/Output Summary (Last 24 hours) at 9/11/2023 2045  Last data filed at 9/11/2023 1453  Gross per 24 hour   Intake 2425 ml   Output --   Net 2425 ml     No intake/output data recorded.     Physical Exam:   General: patient awake, alert and cooperative   Eyes: Normal lids and lashes, no scleral icterus   Neck: supple, normal ROM   Skin: warm and dry, not jaundiced   Cardiovascular: regular rhythm and rate, no murmurs auscultated   Pulm: clear to auscultation bilaterally, regular and unlabored   Abdomen: soft, nontender, nondistended; normal bowel sounds   Extremities: no rash or edema   Psychiatric: Normal mood and behavior; memory intact     Results Review:     I reviewed the patient's new clinical results.    Results from last 7 days   Lab Units 09/11/23  1841 09/11/23  0640 09/10/23  1943   WBC 10*3/mm3 17.67* 19.63* 19.58*   HEMOGLOBIN g/dL 8.0* 8.6* 8.7*   HEMATOCRIT % 23.7* 25.4* 25.8*   PLATELETS 10*3/mm3 380 387 373     Results from last 7 days   Lab Units 09/11/23  0640 09/10/23  1812 09/09/23  2156   SODIUM mmol/L 138 131* 132*   POTASSIUM mmol/L 3.7 3.9 4.0   CHLORIDE mmol/L 103 99 95*   CO2 mmol/L 26.8 19.9* 19.7*   BUN mg/dL 12 14 27*   CREATININE mg/dL 0.56* 0.59 0.96   CALCIUM mg/dL 8.5* 8.4* 10.0   BILIRUBIN mg/dL 6.1* 5.6* 9.0*   ALK PHOS U/L 269* 146* 185*   ALT (SGPT) U/L 88* 88* 142*   AST (SGOT) U/L 104* 100* 159*   GLUCOSE mg/dL 123* 120* 158*     Results from last 7 days   Lab Units 09/11/23  0640 09/09/23 2156   INR  1.16* 1.13*     Lab Results   Lab Value Date/Time    LIPASE 148 (H) 09/09/2023 2156    LIPASE 108 (H) 08/28/2023 0458       Radiology:  CT Abdomen Pelvis With Contrast   Final Result          Assessment & Plan     Active Hospital Problems    Diagnosis     **Severe sepsis      Pancreatic mass     Mixed hyperlipidemia     Benign essential hypertension        Assessment:  Elevated bilirubin, sphincterotomy with Wallstent placement 12 days ago  Elevated transaminases  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 Mg he says this far out from sphincterotomy it is unlikely since the patient is not on anticoagulants or antiplatelets that the bleeding is from the sphincterotomy site  Consider EGD and colonoscopy in the future depending on her clinical course    I discussed the patients findings and my recommendations with patient and nursing staff.    Edwin Ware MD

## 2023-09-13 LAB
ALBUMIN SERPL-MCNC: 2.2 G/DL (ref 3.5–5.2)
ALBUMIN/GLOB SERPL: 0.7 G/DL
ALP SERPL-CCNC: 770 U/L (ref 39–117)
ALT SERPL W P-5'-P-CCNC: 170 U/L (ref 1–33)
AMMONIA BLD-SCNC: 38 UMOL/L (ref 11–51)
ANION GAP SERPL CALCULATED.3IONS-SCNC: 7.6 MMOL/L (ref 5–15)
AST SERPL-CCNC: 265 U/L (ref 1–32)
BILIRUB SERPL-MCNC: 8.4 MG/DL (ref 0–1.2)
BUN SERPL-MCNC: 12 MG/DL (ref 8–23)
BUN/CREAT SERPL: 21.8 (ref 7–25)
BURR CELLS BLD QL SMEAR: ABNORMAL
CALCIUM SPEC-SCNC: 8.2 MG/DL (ref 8.6–10.5)
CHLORIDE SERPL-SCNC: 107 MMOL/L (ref 98–107)
CO2 SERPL-SCNC: 26.4 MMOL/L (ref 22–29)
CREAT SERPL-MCNC: 0.55 MG/DL (ref 0.57–1)
DEPRECATED RDW RBC AUTO: 47.6 FL (ref 37–54)
DEPRECATED RDW RBC AUTO: 50 FL (ref 37–54)
DEPRECATED RDW RBC AUTO: 50.1 FL (ref 37–54)
DEPRECATED RDW RBC AUTO: 51.3 FL (ref 37–54)
EGFRCR SERPLBLD CKD-EPI 2021: 92.8 ML/MIN/1.73
ERYTHROCYTE [DISTWIDTH] IN BLOOD BY AUTOMATED COUNT: 14.5 % (ref 12.3–15.4)
ERYTHROCYTE [DISTWIDTH] IN BLOOD BY AUTOMATED COUNT: 14.8 % (ref 12.3–15.4)
ERYTHROCYTE [DISTWIDTH] IN BLOOD BY AUTOMATED COUNT: 15.2 % (ref 12.3–15.4)
ERYTHROCYTE [DISTWIDTH] IN BLOOD BY AUTOMATED COUNT: 15.4 % (ref 12.3–15.4)
GLOBULIN UR ELPH-MCNC: 3.3 GM/DL
GLUCOSE SERPL-MCNC: 121 MG/DL (ref 65–99)
HCT VFR BLD AUTO: 26.7 % (ref 34–46.6)
HCT VFR BLD AUTO: 27.9 % (ref 34–46.6)
HCT VFR BLD AUTO: 28.5 % (ref 34–46.6)
HGB BLD-MCNC: 8.9 G/DL (ref 12–15.9)
HGB BLD-MCNC: 9.5 G/DL (ref 12–15.9)
HGB BLD-MCNC: 9.6 G/DL (ref 12–15.9)
HGB BLD-MCNC: 9.6 G/DL (ref 12–15.9)
HGB BLD-MCNC: 9.9 G/DL (ref 12–15.9)
LYMPHOCYTES # BLD MANUAL: 0.99 10*3/MM3 (ref 0.7–3.1)
LYMPHOCYTES NFR BLD MANUAL: 5 % (ref 5–12)
MCH RBC QN AUTO: 30.8 PG (ref 26.6–33)
MCH RBC QN AUTO: 31.4 PG (ref 26.6–33)
MCH RBC QN AUTO: 31.5 PG (ref 26.6–33)
MCH RBC QN AUTO: 31.7 PG (ref 26.6–33)
MCHC RBC AUTO-ENTMCNC: 33.3 G/DL (ref 31.5–35.7)
MCHC RBC AUTO-ENTMCNC: 34.1 G/DL (ref 31.5–35.7)
MCHC RBC AUTO-ENTMCNC: 34.4 G/DL (ref 31.5–35.7)
MCHC RBC AUTO-ENTMCNC: 34.7 G/DL (ref 31.5–35.7)
MCV RBC AUTO: 90.8 FL (ref 79–97)
MCV RBC AUTO: 91.2 FL (ref 79–97)
MCV RBC AUTO: 92.4 FL (ref 79–97)
MCV RBC AUTO: 93 FL (ref 79–97)
MONOCYTES # BLD: 0.99 10*3/MM3 (ref 0.1–0.9)
NEUTROPHILS # BLD AUTO: 17.86 10*3/MM3 (ref 1.7–7)
NEUTROPHILS NFR BLD MANUAL: 90 % (ref 42.7–76)
PHOSPHATE SERPL-MCNC: 2.9 MG/DL (ref 2.5–4.5)
PLAT MORPH BLD: NORMAL
PLATELET # BLD AUTO: 365 10*3/MM3 (ref 140–450)
PLATELET # BLD AUTO: 381 10*3/MM3 (ref 140–450)
PLATELET # BLD AUTO: 397 10*3/MM3 (ref 140–450)
PLATELET # BLD AUTO: 399 10*3/MM3 (ref 140–450)
PMV BLD AUTO: 8.9 FL (ref 6–12)
PMV BLD AUTO: 9.1 FL (ref 6–12)
PMV BLD AUTO: 9.3 FL (ref 6–12)
PMV BLD AUTO: 9.4 FL (ref 6–12)
POIKILOCYTOSIS BLD QL SMEAR: ABNORMAL
POLYCHROMASIA BLD QL SMEAR: ABNORMAL
POTASSIUM SERPL-SCNC: 4.6 MMOL/L (ref 3.5–5.2)
POTASSIUM SERPL-SCNC: 4.6 MMOL/L (ref 3.5–5.2)
PROT SERPL-MCNC: 5.5 G/DL (ref 6–8.5)
RBC # BLD AUTO: 2.89 10*6/MM3 (ref 3.77–5.28)
RBC # BLD AUTO: 3 10*6/MM3 (ref 3.77–5.28)
RBC # BLD AUTO: 3.06 10*6/MM3 (ref 3.77–5.28)
RBC # BLD AUTO: 3.14 10*6/MM3 (ref 3.77–5.28)
SODIUM SERPL-SCNC: 141 MMOL/L (ref 136–145)
SPHEROCYTES BLD QL SMEAR: ABNORMAL
VARIANT LYMPHS NFR BLD MANUAL: 5 % (ref 19.6–45.3)
WBC MORPH BLD: NORMAL
WBC NRBC COR # BLD: 12.88 10*3/MM3 (ref 3.4–10.8)
WBC NRBC COR # BLD: 14.91 10*3/MM3 (ref 3.4–10.8)
WBC NRBC COR # BLD: 19.84 10*3/MM3 (ref 3.4–10.8)
WBC NRBC COR # BLD: 19.94 10*3/MM3 (ref 3.4–10.8)

## 2023-09-13 PROCEDURE — 82140 ASSAY OF AMMONIA: CPT | Performed by: INTERNAL MEDICINE

## 2023-09-13 PROCEDURE — 85007 BL SMEAR W/DIFF WBC COUNT: CPT | Performed by: INTERNAL MEDICINE

## 2023-09-13 PROCEDURE — 0 LIDOCAINE 1 % SOLUTION: Performed by: RADIOLOGY

## 2023-09-13 PROCEDURE — 85027 COMPLETE CBC AUTOMATED: CPT | Performed by: INTERNAL MEDICINE

## 2023-09-13 PROCEDURE — 25010000002 HYDROMORPHONE PER 4 MG: Performed by: INTERNAL MEDICINE

## 2023-09-13 PROCEDURE — 85018 HEMOGLOBIN: CPT | Performed by: NURSE PRACTITIONER

## 2023-09-13 PROCEDURE — 85025 COMPLETE CBC W/AUTO DIFF WBC: CPT | Performed by: INTERNAL MEDICINE

## 2023-09-13 PROCEDURE — 80053 COMPREHEN METABOLIC PANEL: CPT | Performed by: INTERNAL MEDICINE

## 2023-09-13 PROCEDURE — 84132 ASSAY OF SERUM POTASSIUM: CPT | Performed by: INTERNAL MEDICINE

## 2023-09-13 PROCEDURE — 99231 SBSQ HOSP IP/OBS SF/LOW 25: CPT | Performed by: SURGERY

## 2023-09-13 PROCEDURE — 99232 SBSQ HOSP IP/OBS MODERATE 35: CPT | Performed by: INTERNAL MEDICINE

## 2023-09-13 PROCEDURE — 85014 HEMATOCRIT: CPT | Performed by: NURSE PRACTITIONER

## 2023-09-13 PROCEDURE — 25010000002 PIPERACILLIN SOD-TAZOBACTAM PER 1 G: Performed by: NURSE PRACTITIONER

## 2023-09-13 PROCEDURE — 25010000002 PIPERACILLIN SOD-TAZOBACTAM PER 1 G: Performed by: INTERNAL MEDICINE

## 2023-09-13 PROCEDURE — 84100 ASSAY OF PHOSPHORUS: CPT | Performed by: INTERNAL MEDICINE

## 2023-09-13 RX ORDER — LIDOCAINE HYDROCHLORIDE 10 MG/ML
10 INJECTION, SOLUTION INFILTRATION; PERINEURAL ONCE
Status: COMPLETED | OUTPATIENT
Start: 2023-09-13 | End: 2023-09-13

## 2023-09-13 RX ADMIN — HYDROMORPHONE HYDROCHLORIDE 0.5 MG: 1 INJECTION, SOLUTION INTRAMUSCULAR; INTRAVENOUS; SUBCUTANEOUS at 02:20

## 2023-09-13 RX ADMIN — LIDOCAINE HYDROCHLORIDE 10 ML: 10 INJECTION, SOLUTION INFILTRATION; PERINEURAL at 10:20

## 2023-09-13 RX ADMIN — CETIRIZINE HYDROCHLORIDE 10 MG: 10 TABLET ORAL at 11:24

## 2023-09-13 RX ADMIN — HYDROMORPHONE HYDROCHLORIDE 0.5 MG: 1 INJECTION, SOLUTION INTRAMUSCULAR; INTRAVENOUS; SUBCUTANEOUS at 23:31

## 2023-09-13 RX ADMIN — PANTOPRAZOLE SODIUM 40 MG: 40 INJECTION, POWDER, FOR SOLUTION INTRAVENOUS at 20:59

## 2023-09-13 RX ADMIN — PIPERACILLIN SODIUM AND TAZOBACTAM SODIUM 3.38 G: 3; .375 INJECTION, SOLUTION INTRAVENOUS at 21:59

## 2023-09-13 RX ADMIN — DESVENLAFAXINE SUCCINATE 50 MG: 50 TABLET, EXTENDED RELEASE ORAL at 11:24

## 2023-09-13 RX ADMIN — PANTOPRAZOLE SODIUM 40 MG: 40 INJECTION, POWDER, FOR SOLUTION INTRAVENOUS at 11:24

## 2023-09-13 RX ADMIN — PIPERACILLIN SODIUM AND TAZOBACTAM SODIUM 3.38 G: 3; .375 INJECTION, SOLUTION INTRAVENOUS at 06:18

## 2023-09-13 RX ADMIN — POTASSIUM CHLORIDE 40 MEQ: 750 TABLET, EXTENDED RELEASE ORAL at 02:09

## 2023-09-13 RX ADMIN — PIPERACILLIN SODIUM AND TAZOBACTAM SODIUM 3.38 G: 3; .375 INJECTION, SOLUTION INTRAVENOUS at 15:07

## 2023-09-13 NOTE — PROGRESS NOTES
[facility]  Inpatient Progress Note    Reason for Follow Up: GI bleed    Subjective     Interval History:   No further bleeding  Cholecystostomy tube was placed  Cholecystectomy on hold for now    Current Facility-Administered Medications:     aluminum-magnesium hydroxide-simethicone (MAALOX MAX) 400-400-40 MG/5ML suspension 15 mL, 15 mL, Oral, Q6H PRN, Sussy Crawley APRKATHIE    Calcium Replacement - Follow Nurse / BPA Driven Protocol, , Does not apply, PRN, Ankur Lehman MD    cetirizine (zyrTEC) tablet 10 mg, 10 mg, Oral, Daily, Ankur Lehman MD, 10 mg at 09/13/23 1124    desvenlafaxine (PRISTIQ) 24 hr tablet 50 mg, 50 mg, Oral, Daily, Ankur Lehman MD, 50 mg at 09/13/23 1124    HYDROmorphone (DILAUDID) injection 0.5 mg, 0.5 mg, Intravenous, Q2H PRN, Ankur Lehman MD, 0.5 mg at 09/13/23 0220    hydrOXYzine (ATARAX) tablet 25 mg, 25 mg, Oral, TID PRN, Ankur Lehman MD    lactulose (CHRONULAC) 10 GM/15ML solution 10 g, 10 g, Oral, BID PRN, Ankur Lehman MD    Magnesium Standard Dose Replacement - Follow Nurse / BPA Driven Protocol, , Does not apply, PRN, Ankur Lehman MD    melatonin tablet 3 mg, 3 mg, Oral, Nightly PRN, Sussy Crawley APRN    nitroglycerin (NITROSTAT) SL tablet 0.4 mg, 0.4 mg, Sublingual, Q5 Min PRN, Sussy Crawley APRN    ondansetron (ZOFRAN) tablet 4 mg, 4 mg, Oral, Q6H PRN **OR** ondansetron (ZOFRAN) injection 4 mg, 4 mg, Intravenous, Q6H PRN, Sussy Crawley APRN    pantoprazole (PROTONIX) injection 40 mg, 40 mg, Intravenous, Q12H, Ankur Lehman MD, 40 mg at 09/13/23 1124    Phosphorus Replacement - Follow Nurse / BPA Driven Protocol, , Does not apply, PRN, Ankur Lehman MD    piperacillin-tazobactam (ZOSYN) 3.375 g in iso-osmotic dextrose 50 ml (premix), 3.375 g, Intravenous, Q8H, Feliciano Staley MD, 3.375 g at 09/13/23 0618    Potassium Replacement - Follow Nurse / BPA Driven Protocol, , Does  not apply, PRN, Ankur Lehman MD    sodium chloride 0.9 % flush 10 mL, 10 mL, Intravenous, PRN, Sussy Crawley, APRN, 10 mL at 09/11/23 2112    traMADol (ULTRAM) tablet 50 mg, 50 mg, Oral, Q4H PRN, Ankur Lehman MD    Facility-Administered Medications Ordered in Other Encounters:     Chlorhexidine Gluconate 2 % pads 2 each, 2 pad , Apply externally, BID, Krish Ramirez MD  Review of Systems:    There is weakness and fatigue all other systems reviewed and negative    Objective     Vital Signs  Temp:  [97.3 °F (36.3 °C)-98.4 °F (36.9 °C)] 98.4 °F (36.9 °C)  Heart Rate:  [] 103  Resp:  [16-18] 16  BP: (118-156)/(68-85) 156/85  Body mass index is 20.88 kg/m².    Intake/Output Summary (Last 24 hours) at 9/13/2023 1351  Last data filed at 9/13/2023 0629  Gross per 24 hour   Intake 2349 ml   Output 1200 ml   Net 1149 ml     No intake/output data recorded.     Physical Exam:   General: patient awake, alert and cooperative   Eyes: Normal lids and lashes, no scleral icterus   Neck: supple, normal ROM   Skin: warm and dry, not jaundiced   Cardiovascular: regular rhythm and rate, no murmurs auscultated   Pulm: clear to auscultation bilaterally, regular and unlabored   Abdomen: soft, nontender, nondistended; normal bowel sounds   Extremities: no rash or edema   Psychiatric: Normal mood and behavior; memory intact     Results Review:     I reviewed the patient's new clinical results.    Results from last 7 days   Lab Units 09/13/23  0448 09/13/23  0005 09/12/23  1557   WBC 10*3/mm3 19.84* 19.94* 18.33*   HEMOGLOBIN g/dL 8.9* 9.5* 9.7*   HEMATOCRIT % 26.7* 27.9* 27.5*   PLATELETS 10*3/mm3 365 381 373     Results from last 7 days   Lab Units 09/13/23  0448 09/12/23  1932 09/12/23  0318 09/11/23  0640   SODIUM mmol/L 141  --  136 138   POTASSIUM mmol/L 4.6  4.6 3.4* 3.1* 3.7   CHLORIDE mmol/L 107  --  104 103   CO2 mmol/L 26.4  --  25.0 26.8   BUN mg/dL 12  --  13 12   CREATININE mg/dL 0.55*  --  0.60 0.56*    CALCIUM mg/dL 8.2* 8.2* 7.8* 8.5*   BILIRUBIN mg/dL 8.4*  --  5.6* 6.1*   ALK PHOS U/L 770*  --  618* 269*   ALT (SGPT) U/L 170*  --  95* 88*   AST (SGOT) U/L 265*  --  133* 104*   GLUCOSE mg/dL 121*  --  138* 123*     Results from last 7 days   Lab Units 09/11/23  0640 09/09/23 2156   INR  1.16* 1.13*     Lab Results   Lab Value Date/Time    LIPASE 148 (H) 09/09/2023 2156    LIPASE 108 (H) 08/28/2023 0458       Radiology:  CT Guided Abscess Drain Peritoneal   Final Result   Successful cholecystostomy tube placement as described above. Daily   flushing (including at home) necessary. Tract maturation requires at   least 6 weeks. Cystic duct patency to be assessed prior to potential   removal.           This report was finalized on 9/12/2023 6:53 PM by Dr. Judson Stephens M.D.          XR Abdomen 2+ VW with Chest 1 VW   Final Result   No acute abnormality.       This report was finalized on 9/12/2023 3:55 PM by Dr. Joel Brewster M.D.          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  Globin stable after transfusion  Restart IV fluids 125 cc an hour  He can move to every morning hemoglobin  Cholecystostomy tube placed-being managed by general surgery  advance diet as  Protonix IV every 12 ordered  I spoke with Dr. Mg he says this far out from sphincterotomy it is unlikely since the patient is not on anticoagulants or antiplatelets that the bleeding is from the sphincterotomy site  Consider  EGD and colonoscopy in the future depending on her clinical course  We will follow     I discussed the patients findings and my recommendations with patient and nursing staff.    Edwin Ware MD

## 2023-09-13 NOTE — NURSING NOTE
Pt brought back to radiology triage to better secure her drain with more sutures by Dr. Stephens. Drain irrigated with 10 ml NS and draining well.

## 2023-09-13 NOTE — PROGRESS NOTES
Continued Stay Note  [facility]     Patient Name: Lizzie Bose  MRN: [id number]  Today's Date: 9/13/2023    Admit Date: 9/9/2023    Plan: Return home   Discharge Plan       Row Name 09/13/23 1449       Plan    Plan Comments Spoke with RNKarlene. Pt's sonAri and pt's family/friends called a consult to [facility]osp[facility]. Idalia with Hospice [phone] met with pt and friends at the bedside. [place]Ari [place]. CCP placed consult for Palliative to eval, discussed with Dr. Staley. Noted psych eval pending.  Physical Therapy eval also pending. CCP will follow progress.                   Discharge Codes    No documentation.                 Expected Discharge Date and Time       Expected Discharge Date Expected Discharge Time    Sep 18, 2023               Susanna Beard RN

## 2023-09-13 NOTE — PROGRESS NOTES
Name: Lizzie Bose ADMIT: 2023   : 1945  PCP: Ct Alford APRN    MRN: [id number] LOS: 4 days   AGE/SEX: 78 y.o. female  ROOM: [facility]     Subjective   Subjective   Patient is seen at bedside, she appears very confused.       Objective   Objective   Vital Signs  Temp:  [97.3 °F (36.3 °C)-98.4 °F (36.9 °C)] 98.1 °F (36.7 °C)  Heart Rate:  [] 92  Resp:  [16-18] 18  BP: (116-156)/() 116/100  SpO2:  [95 %-100 %] 96 %  on   ;   Device (Oxygen Therapy): room air  Body mass index is 20.88 kg/m².  Physical Exam  Vitals and nursing note reviewed.   Constitutional:       General: She is not in acute distress.     Appearance: She is ill-appearing. She is not diaphoretic.   HENT:      Head: Atraumatic.   Eyes:      General: Scleral icterus present.   Cardiovascular:      Rate and Rhythm: Normal rate and regular rhythm.      Pulses: Normal pulses.   Pulmonary:      Effort: Pulmonary effort is normal.      Breath sounds: No wheezing.   Abdominal:      General: There is no distension.      Palpations: Abdomen is soft.      Tenderness: There is no abdominal tenderness. There is no guarding or rebound.   Musculoskeletal:         General: No swelling or tenderness.   Skin:     General: Skin is warm and dry.      Coloration: Skin is jaundiced.   Neurological:      Mental Status: She is alert. Mental status is at baseline.   Psychiatric:      Appears to be very confused    Copied text material from yesterday's note has been reviewed for appropriate changes and remains accurate as of 23.      Results Review     I reviewed the patient's new clinical results.  Results from last 7 days   Lab Units 23  0448 23  0005 23  1557 23   WBC 10*3/mm3 19.84* 19.94* 18.33* 18.71*   HEMOGLOBIN g/dL 8.9* 9.5* 9.7* 7.5*   PLATELETS 10*3/mm3 365 381 373 403     Results from last 7 days   Lab Units 23  0448 23  1932 23  0318 23  0640 09/10/23  1812   SODIUM mmol/L  141  --  136 138 131*   POTASSIUM mmol/L 4.6  4.6 3.4* 3.1* 3.7 3.9   CHLORIDE mmol/L 107  --  104 103 99   CO2 mmol/L 26.4  --  25.0 26.8 19.9*   BUN mg/dL 12  --  13 12 14   CREATININE mg/dL 0.55*  --  0.60 0.56* 0.59   GLUCOSE mg/dL 121*  --  138* 123* 120*   EGFR mL/min/1.73 92.8  --  90.9 92.4 91.2     Results from last 7 days   Lab Units 09/13/23  0448 09/12/23  0318 09/11/23  0640 09/10/23  1812   ALBUMIN g/dL 2.2* 2.2* 2.6* 2.1*   BILIRUBIN mg/dL 8.4* 5.6* 6.1* 5.6*   ALK PHOS U/L 770* 618* 269* 146*   AST (SGOT) U/L 265* 133* 104* 100*   ALT (SGPT) U/L 170* 95* 88* 88*     Results from last 7 days   Lab Units 09/13/23 0448 09/12/23  1932 09/12/23 0318 09/11/23 0640 09/10/23  1812   CALCIUM mg/dL 8.2* 8.2* 7.8* 8.5* 8.4*   ALBUMIN g/dL 2.2*  --  2.2* 2.6* 2.1*   MAGNESIUM mg/dL  --  1.8  --   --   --    PHOSPHORUS mg/dL 2.9 2.2*  --   --   --      Results from last 7 days   Lab Units 09/10/23  0701 09/10/23  0359 09/10/23  0054 09/09/23  2156   LACTATE mmol/L 1.8 2.1* 2.7* 3.5*     No results found for: HGBA1C, POCGLU    XR Abdomen 2+ VW with Chest 1 VW    Result Date: 9/12/2023  No acute abnormality.  This report was finalized on 9/12/2023 3:55 PM by Dr. Joel Brewster M.D.      CT Guided Abscess Drain Peritoneal    Result Date: 9/12/2023  Successful cholecystostomy tube placement as described above. Daily flushing (including at home) necessary. Tract maturation requires at least 6 weeks. Cystic duct patency to be assessed prior to potential removal.   This report was finalized on 9/12/2023 6:53 PM by Dr. Judson Stephens M.D.       I have personally reviewed all medications:  Scheduled Medications  cetirizine, 10 mg, Oral, Daily  desvenlafaxine, 50 mg, Oral, Daily  pantoprazole, 40 mg, Intravenous, Q12H  piperacillin-tazobactam, 3.375 g, Intravenous, Q8H    Infusions   Diet  NPO Diet NPO Type: Sips with Meds, Ice Chips    I have personally reviewed:  [x]  Laboratory   [x]  Microbiology   [x]  Radiology    [x]  EKG/Telemetry  [x]  Cardiology/Vascular   []  Pathology    []  Records       Assessment/Plan     Active Hospital Problems    Diagnosis  POA    **Severe sepsis [A41.9, R65.20]  Yes    Pancreatic mass [K86.89]  Yes    Mixed hyperlipidemia [E78.2]  Yes    Benign essential hypertension [I10]  Yes      Resolved Hospital Problems   No resolved problems to display.       78 y.o. female admitted with Severe sepsis.    Cholecystitis: Blood cultures are negative. Continue zosyn.  Surgery on board.  Management with cholecystostomy, eventual cholecystectomy. Surgery and GI following.  EPEC diarrhea: Continue Zosyn.  Sepsis: Lactic acidosis improved. Fever last night. Monitoring.  Pancreatic Mass/Obstructive Jaundice: Stent placement last admission. This is presumed pancreatic cancer with high ca19-9 and brushing suspicious for malignancy but not diagnostic. EUS will be needed. Oncology, gastroenterology on board.  GIB: Single bloody bowel movement. PPI. Serial HH. Transfuse if needed.  HTN: Acceptable acutely.  HLD: Statin allergy  Acute confusion, check ammonia levels, consult psychiatry.  PPX: SCD  Disposition: TBD    Feliciano Staley MD  [facility]  09/13/23  15:28 EDT

## 2023-09-13 NOTE — PLAN OF CARE
Goal Outcome Evaluation:  Plan of Care Reviewed With: patient        Progress: no change  Outcome Evaluation: vitals stable.  pt expressed pain.  meds given per orders.   peritoneal abscess drain in place.  drain flushed per order.  electrolytes replaced per protocols.  UA sent.  timed CBC ordered with transfusion orders in place.  will continue to monitor.

## 2023-09-13 NOTE — PROGRESS NOTES
Follow-up jaundice/pancreatic mass/possible cholecystitis    Subjective:  No significant change, underwent cholecystostomy tube placement yesterday, seems to be tolerating this at this point, not taking much by mouth.    Objective:  Afebrile stable vitals  General: Awake and alert, chronic ill appearance, no acute distress  Abdomen: Soft, minimal tenderness, drain is in place, about 200 out overnight    Labs are reviewed, white blood cell count essentially unchanged, hemoglobin 8.9, total bilirubin increased to 8.4    Assessment and plan:  -Pancreatic mass, overall picture most consistent with that of pancreatic malignancy but ERCP brushings did not clearly delineate the process.  She would benefit from endoscopic ultrasound, not available at this facility.  I have placed a referral for Dr. Morfin to see her for endoscopic ultrasound and biopsy  -Cholecystitis, treated with cholecystostomy tube at this point  -Diet as tolerated  -Prognosis is poor    Chase Nur MD  General and Endoscopic Surgery  [facility]    [address], [address]  [place], [place], [address]  P: [phone]  F: [phone]

## 2023-09-14 LAB
ALBUMIN SERPL-MCNC: 2 G/DL (ref 3.5–5.2)
ALBUMIN/GLOB SERPL: 0.6 G/DL
ALP SERPL-CCNC: 628 U/L (ref 39–117)
ALT SERPL W P-5'-P-CCNC: 128 U/L (ref 1–33)
ANION GAP SERPL CALCULATED.3IONS-SCNC: 8.6 MMOL/L (ref 5–15)
AST SERPL-CCNC: 146 U/L (ref 1–32)
BACTERIA SPEC AEROBE CULT: NORMAL
BACTERIA SPEC AEROBE CULT: NORMAL
BASOPHILS # BLD AUTO: 0.03 10*3/MM3 (ref 0–0.2)
BASOPHILS NFR BLD AUTO: 0.3 % (ref 0–1.5)
BILIRUB SERPL-MCNC: 5.8 MG/DL (ref 0–1.2)
BUN SERPL-MCNC: 17 MG/DL (ref 8–23)
BUN/CREAT SERPL: 31.5 (ref 7–25)
CALCIUM SPEC-SCNC: 7.9 MG/DL (ref 8.6–10.5)
CHLORIDE SERPL-SCNC: 106 MMOL/L (ref 98–107)
CO2 SERPL-SCNC: 24.4 MMOL/L (ref 22–29)
CREAT SERPL-MCNC: 0.54 MG/DL (ref 0.57–1)
DEPRECATED RDW RBC AUTO: 51.4 FL (ref 37–54)
EGFRCR SERPLBLD CKD-EPI 2021: 93.2 ML/MIN/1.73
EOSINOPHIL # BLD AUTO: 0.1 10*3/MM3 (ref 0–0.4)
EOSINOPHIL NFR BLD AUTO: 0.9 % (ref 0.3–6.2)
ERYTHROCYTE [DISTWIDTH] IN BLOOD BY AUTOMATED COUNT: 15.2 % (ref 12.3–15.4)
GLOBULIN UR ELPH-MCNC: 3.3 GM/DL
GLUCOSE SERPL-MCNC: 97 MG/DL (ref 65–99)
HCT VFR BLD AUTO: 25.8 % (ref 34–46.6)
HGB BLD-MCNC: 8.8 G/DL (ref 12–15.9)
IMM GRANULOCYTES # BLD AUTO: 0.24 10*3/MM3 (ref 0–0.05)
IMM GRANULOCYTES NFR BLD AUTO: 2.2 % (ref 0–0.5)
LYMPHOCYTES # BLD AUTO: 1.22 10*3/MM3 (ref 0.7–3.1)
LYMPHOCYTES NFR BLD AUTO: 11 % (ref 19.6–45.3)
MCH RBC QN AUTO: 31.7 PG (ref 26.6–33)
MCHC RBC AUTO-ENTMCNC: 34.1 G/DL (ref 31.5–35.7)
MCV RBC AUTO: 92.8 FL (ref 79–97)
MONOCYTES # BLD AUTO: 0.45 10*3/MM3 (ref 0.1–0.9)
MONOCYTES NFR BLD AUTO: 4.1 % (ref 5–12)
NEUTROPHILS NFR BLD AUTO: 81.5 % (ref 42.7–76)
NEUTROPHILS NFR BLD AUTO: 9.06 10*3/MM3 (ref 1.7–7)
NRBC BLD AUTO-RTO: 0.5 /100 WBC (ref 0–0.2)
PLATELET # BLD AUTO: 344 10*3/MM3 (ref 140–450)
PMV BLD AUTO: 9.3 FL (ref 6–12)
POTASSIUM SERPL-SCNC: 3.3 MMOL/L (ref 3.5–5.2)
POTASSIUM SERPL-SCNC: 3.9 MMOL/L (ref 3.5–5.2)
PROT SERPL-MCNC: 5.3 G/DL (ref 6–8.5)
RBC # BLD AUTO: 2.78 10*6/MM3 (ref 3.77–5.28)
SODIUM SERPL-SCNC: 139 MMOL/L (ref 136–145)
WBC NRBC COR # BLD: 11.1 10*3/MM3 (ref 3.4–10.8)

## 2023-09-14 PROCEDURE — 99232 SBSQ HOSP IP/OBS MODERATE 35: CPT | Performed by: INTERNAL MEDICINE

## 2023-09-14 PROCEDURE — 97162 PT EVAL MOD COMPLEX 30 MIN: CPT

## 2023-09-14 PROCEDURE — 99231 SBSQ HOSP IP/OBS SF/LOW 25: CPT | Performed by: SURGERY

## 2023-09-14 PROCEDURE — 97530 THERAPEUTIC ACTIVITIES: CPT

## 2023-09-14 PROCEDURE — 85025 COMPLETE CBC W/AUTO DIFF WBC: CPT | Performed by: INTERNAL MEDICINE

## 2023-09-14 PROCEDURE — 25010000002 PIPERACILLIN SOD-TAZOBACTAM PER 1 G: Performed by: INTERNAL MEDICINE

## 2023-09-14 PROCEDURE — 80053 COMPREHEN METABOLIC PANEL: CPT | Performed by: INTERNAL MEDICINE

## 2023-09-14 PROCEDURE — 84132 ASSAY OF SERUM POTASSIUM: CPT | Performed by: INTERNAL MEDICINE

## 2023-09-14 RX ORDER — POTASSIUM CHLORIDE 750 MG/1
40 TABLET, FILM COATED, EXTENDED RELEASE ORAL EVERY 4 HOURS
Status: COMPLETED | OUTPATIENT
Start: 2023-09-14 | End: 2023-09-14

## 2023-09-14 RX ADMIN — PIPERACILLIN SODIUM AND TAZOBACTAM SODIUM 3.38 G: 3; .375 INJECTION, SOLUTION INTRAVENOUS at 15:13

## 2023-09-14 RX ADMIN — PANTOPRAZOLE SODIUM 40 MG: 40 INJECTION, POWDER, FOR SOLUTION INTRAVENOUS at 09:41

## 2023-09-14 RX ADMIN — POTASSIUM CHLORIDE 40 MEQ: 750 TABLET, EXTENDED RELEASE ORAL at 13:10

## 2023-09-14 RX ADMIN — DESVENLAFAXINE SUCCINATE 50 MG: 50 TABLET, EXTENDED RELEASE ORAL at 09:41

## 2023-09-14 RX ADMIN — POTASSIUM CHLORIDE 40 MEQ: 750 TABLET, EXTENDED RELEASE ORAL at 09:41

## 2023-09-14 RX ADMIN — PANTOPRAZOLE SODIUM 40 MG: 40 INJECTION, POWDER, FOR SOLUTION INTRAVENOUS at 22:05

## 2023-09-14 RX ADMIN — CETIRIZINE HYDROCHLORIDE 10 MG: 10 TABLET ORAL at 09:41

## 2023-09-14 RX ADMIN — PIPERACILLIN SODIUM AND TAZOBACTAM SODIUM 3.38 G: 3; .375 INJECTION, SOLUTION INTRAVENOUS at 22:05

## 2023-09-14 RX ADMIN — PIPERACILLIN SODIUM AND TAZOBACTAM SODIUM 3.38 G: 3; .375 INJECTION, SOLUTION INTRAVENOUS at 06:05

## 2023-09-14 NOTE — PROGRESS NOTES
[facility]  Inpatient Progress Note    Reason for Follow Up: GI bleed    Subjective     Interval History:   Dark bloody stools overnight.  Cholecystostomy tube in place.  She is having minimal discomfort from the tube but overall her abdominal pain is better.  She is eating.  No nausea.    Current Facility-Administered Medications:     aluminum-magnesium hydroxide-simethicone (MAALOX MAX) 400-400-40 MG/5ML suspension 15 mL, 15 mL, Oral, Q6H PRN, Sussy Crawley APRN    Calcium Replacement - Follow Nurse / BPA Driven Protocol, , Does not apply, PRN, Ankur Lehman MD    cetirizine (zyrTEC) tablet 10 mg, 10 mg, Oral, Daily, Ankur Lehman MD, 10 mg at 09/13/23 1124    desvenlafaxine (PRISTIQ) 24 hr tablet 50 mg, 50 mg, Oral, Daily, Ankur Lehman MD, 50 mg at 09/13/23 1124    HYDROmorphone (DILAUDID) injection 0.5 mg, 0.5 mg, Intravenous, Q2H PRN, Ankur Lehman MD, 0.5 mg at 09/13/23 2331    hydrOXYzine (ATARAX) tablet 25 mg, 25 mg, Oral, TID PRN, Ankur Lehman MD    lactulose (CHRONULAC) 10 GM/15ML solution 10 g, 10 g, Oral, BID PRN, Ankur Lehman MD    Magnesium Standard Dose Replacement - Follow Nurse / BPA Driven Protocol, , Does not apply, PRN, Ankur Lehman MD    melatonin tablet 3 mg, 3 mg, Oral, Nightly PRN, Sussy Crawley APRN    nitroglycerin (NITROSTAT) SL tablet 0.4 mg, 0.4 mg, Sublingual, Q5 Min PRN, Sussy Crawley APRN    ondansetron (ZOFRAN) tablet 4 mg, 4 mg, Oral, Q6H PRN **OR** ondansetron (ZOFRAN) injection 4 mg, 4 mg, Intravenous, Q6H PRN, Sussy Crawley APRN    pantoprazole (PROTONIX) injection 40 mg, 40 mg, Intravenous, Q12H, Ankur Lehman MD, 40 mg at 09/13/23 2059    Phosphorus Replacement - Follow Nurse / BPA Driven Protocol, , Does not apply, PRN, Ankur Lehman MD    piperacillin-tazobactam (ZOSYN) 3.375 g in iso-osmotic dextrose 50 ml (premix), 3.375 g, Intravenous, Q8H, Feliciano Staley MD,  3.375 g at 09/14/23 0605    potassium chloride (K-DUR,KLOR-CON) ER tablet 40 mEq, 40 mEq, Oral, Q4H, Feliciano Staley MD    Potassium Replacement - Follow Nurse / BPA Driven Protocol, , Does not apply, PRN, Ankur Lehman MD    sodium chloride 0.9 % flush 10 mL, 10 mL, Intravenous, PRN, Sussy Crawley, APRN, 10 mL at 09/11/23 2112    traMADol (ULTRAM) tablet 50 mg, 50 mg, Oral, Q4H PRN, Ankur Lehman MD    Facility-Administered Medications Ordered in Other Encounters:     Chlorhexidine Gluconate 2 % pads 2 each, 2 pad , Apply externally, BID, Krish Ramirez MD  Review of Systems:    Blood in stool, no nausea or vomiting, negative for fever chills    Objective     Vital Signs  Temp:  [98.1 °F (36.7 °C)-99 °F (37.2 °C)] 99 °F (37.2 °C)  Heart Rate:  [75-98] 75  Resp:  [18] 18  BP: (116-142)/() 122/74  Body mass index is 20.88 kg/m².    Intake/Output Summary (Last 24 hours) at 9/14/2023 0939  Last data filed at 9/14/2023 0900  Gross per 24 hour   Intake 120 ml   Output 100 ml   Net 20 ml     I/O this shift:  In: 120 [P.O.:120]  Out: -      Physical Exam:   General: patient awake, alert and cooperative   Eyes: Normal lids and lashes, no scleral icterus   Neck: supple, normal ROM   Skin: warm and dry, not jaundiced   Pulm: clear to auscultation bilaterally, regular and unlabored   Abdomen: soft, nontender, nondistended; cholecystostomy tube in place with bilious drainage   Psychiatric: Normal mood and behavior; memory intact     Results Review:     I reviewed the patient's new clinical results.    Results from last 7 days   Lab Units 09/14/23  0751 09/13/23  2242 09/13/23  1610   WBC 10*3/mm3 11.10* 12.88* 14.91*   HEMOGLOBIN g/dL 8.8* 9.6*  9.6* 9.9*   HEMATOCRIT % 25.8* 27.9*  27.9* 28.5*   PLATELETS 10*3/mm3 344 399 397     Results from last 7 days   Lab Units 09/14/23  0751 09/13/23  0448 09/12/23  1932 09/12/23  0318   SODIUM mmol/L 139 141  --  136   POTASSIUM mmol/L 3.3* 4.6  4.6 3.4*  3.1*   CHLORIDE mmol/L 106 107  --  104   CO2 mmol/L 24.4 26.4  --  25.0   BUN mg/dL 17 12  --  13   CREATININE mg/dL 0.54* 0.55*  --  0.60   CALCIUM mg/dL 7.9* 8.2* 8.2* 7.8*   BILIRUBIN mg/dL 5.8* 8.4*  --  5.6*   ALK PHOS U/L 628* 770*  --  618*   ALT (SGPT) U/L 128* 170*  --  95*   AST (SGOT) U/L 146* 265*  --  133*   GLUCOSE mg/dL 97 121*  --  138*     Results from last 7 days   Lab Units 09/11/23  0640 09/09/23 2156   INR  1.16* 1.13*     Lab Results   Lab Value Date/Time    LIPASE 148 (H) 09/09/2023 2156    LIPASE 108 (H) 08/28/2023 0458       Radiology:  CT Guided Abscess Drain Peritoneal   Final Result   Successful cholecystostomy tube placement as described above. Daily   flushing (including at home) necessary. Tract maturation requires at   least 6 weeks. Cystic duct patency to be assessed prior to potential   removal.           This report was finalized on 9/12/2023 6:53 PM by Dr. Judson Stephens M.D.          XR Abdomen 2+ VW with Chest 1 VW   Final Result   No acute abnormality.       This report was finalized on 9/12/2023 3:55 PM by Dr. Joel Brewster M.D.          CT Abdomen Pelvis With Contrast   Final Result          Assessment & Plan     Active Hospital Problems    Diagnosis     **Severe sepsis     Pancreatic mass     Mixed hyperlipidemia     Benign essential hypertension      All problems are new to me today  Assessment:  Elevated bilirubin, sphincterotomy with Wallstent placement 8/29  Elevated transaminases  Pancreas neoplasm suspected-brushings were nondiagnostic, EUS panned as an outpatient  Maroon stool the evening of 9/11 and 9/14 with a slight drop in hemoglobin, patient received packed red blood cells on 9/12 morning      Plan:  Continue twice daily Protonix  Hemoglobin down only slightly-continue to follow.  May need EGD if bleeding persists or hemoglobin continues to decline.  Unlikely to be related to recent sphincterotomy at this point-too far out from procedure  Plans for  outpatient EUS with Dr. Morfin noted  Surgery managing cholecystostomy tube    I discussed the patients findings and my recommendations with patient.    Denice Kirk MD

## 2023-09-14 NOTE — CONSULTS
ONCOLOGY SOCIAL WORKER PSYCHOSOCIAL ASSESSMENT    Date:  9/14/2023      Reason for Visit:  new diagnosis/ psychosocial support/ community resources    I.    PHYSICAL:     Diagnosis:   Patient Active Problem List   Diagnosis    PAT (obstructive sleep apnea)    Lumbar pain    Primary osteoarthritis of left knee    Primary osteoarthritis of right knee    Benign essential hypertension    Mixed hyperlipidemia    Vitamin D deficiency    Age-related osteoporosis without current pathological fracture    Hyperinsulinism    Chronic insomnia    Attention deficit hyperactivity disorder (ADHD), combined type    Dyspnea on exertion    Dyslipidemia    Biliary obstruction    Elevated liver enzymes    Pancreatic mass    Severe sepsis      Date of Diagnosis:  8/26/2023   Treatment:  TBD -     Other:       II.   FINANCIAL:  Employment Status:  Retired  VA Benefits: No  Source of Income:  Social Security and [facility]  Other:    Transportation Issues:  Yes   Means of Transportation:  still drives, but may need additional assistance   Prescription Drug Coverage:  Yes  Medications Unable to Afford:  unknown   Pharmacy Name/Location:    [facility]/pharmacy #[id number] - [place], [place] - [address] AT IN [facility] - [phone]  - [phone]   [address] [place] [address]  Phone: [phone] Fax: [phone]    [facility] [place] [address]  Phone: [phone] Fax: [phone]     III.  SOCIAL:    Marital Status:    Significant Other:  No,   Children:   Yes - 2 Adult Sons - 1 lives [facility], 1 lives in [place], [place]   Do the children know about the cancer diagnosis:  Yes  Does the patient have:  Living Will / Advanced Directive  Home Situation:  lives at home alone in the [place]. States that it is one story with one step to enter.     Patient's Support System:  limited to sons and neighbors     ADLs - Functioning Level at Home:  Independent- patient reports being  independent prior to admission.   Able to (on own):  Walk, Bathe, Dress, and Cook prior to admission on 9/9/2023   Current DME:  none   Current Home Health:  none at this time   Dialysis:  No, Type (if applicable):        IV.    MENTAL:    Emotional Status:  Calm    Mental Status:  Alert - had trouble following conversation at times. Lots of redirection due to inability to focus; tangential at times.   Any current psychiatric illness:  No,   History of psychiatric illness:  Yes, had seen Dr Booker   Family history of psychiatric illness:  unknown    Current Psychiatrist:  none   Current Therapist:  none   Taking any psychiatric medications:  No,   Suicidal Ideation:  No;    Homicidal Ideation:  No;   Coping Skills / Strengths:  open, talkative, engaging     V.    SPIRITUAL:    Muslim Affiliation:  none - states that she is spiritual, but not Caodaism  Attend Services Regularly:  No    VI.   OTHER:    Education Level:  advanced degree   Legal Issues:  No      VII.  GOALS / NEEDS:    Goals:  to have a biopsy to determine official cancer diagnosis.   Needs:  TBD   Referrals Made:  none at this time       VIII.  PATIENT CONCERNS:    Her diagnosis and treatment plan.  Discussed [facility].  She is familiar with their services due to her  using them prior to his passing.     [facility] will cont to follow and assist as needed.  Patient provided permission for me to contact her son Ari in [place].      Leslie Brothers, JOSE  09/14/23  14:32 EDT

## 2023-09-14 NOTE — PROGRESS NOTES
"Nutrition Services    Patient Name:  Lizzie Bose  YOB: 1945  MRN: [id number]  Admit Date:  9/9/2023    FOLLOW UP - CLINICAL NUTRITION    Assessment Date:  09/14/23    Encounter Information         Current Summary Cholecystostomy tube in place. 9/13 pt taken back to radiology to better secure drain with more sutures. S/p ERCP with improvement of jaundice. Pt needs outpt endoscopic US per surgery notes. Pancreatic mass likely malignancy per MD notes.  Pt tolerating regular diet well with 100% po intake today.   2 bloody BM's 9/13     Current Nutrition Orders & Evaluation of Intake       Oral Nutrition     Current PO Diet Diet: Regular/House Diet; Texture: Regular Texture (IDDSI 7); Fluid Consistency: Thin (IDDSI 0)   Supplement n/a   PO Evaluation     PO Intake % 100%    Factors Affecting Intake  No factors at this time   --  Anthropometrics          Height    Weight Height: 165.1 cm (65\")  Weight: 56.9 kg (125 lb 8 oz) (09/10/23 0308)    BMI kg/m2 Body mass index is 20.88 kg/m².  Normal/Healthy (18.4 - 24.9)    Weight trend Loss     Labs        Pertinent Labs Reviewed, listed below     Results from last 7 days   Lab Units 09/14/23  0751 09/13/23 0448 09/12/23  1932 09/12/23  0318   SODIUM mmol/L 139 141  --  136   POTASSIUM mmol/L 3.3* 4.6  4.6 3.4* 3.1*   CHLORIDE mmol/L 106 107  --  104   CO2 mmol/L 24.4 26.4  --  25.0   BUN mg/dL 17 12  --  13   CREATININE mg/dL 0.54* 0.55*  --  0.60   CALCIUM mg/dL 7.9* 8.2* 8.2* 7.8*   BILIRUBIN mg/dL 5.8* 8.4*  --  5.6*   ALK PHOS U/L 628* 770*  --  618*   ALT (SGPT) U/L 128* 170*  --  95*   AST (SGOT) U/L 146* 265*  --  133*   GLUCOSE mg/dL 97 121*  --  138*       Results from last 7 days   Lab Units 09/14/23  0751 09/13/23  1610 09/13/23  0448 09/13/23  0005 09/12/23  1932   MAGNESIUM mg/dL  --   --   --   --  1.8   PHOSPHORUS mg/dL  --   --  2.9  --  2.2*   HEMOGLOBIN g/dL 8.8*   < > 8.9*   < >  --    HEMATOCRIT % 25.8*   < > 26.7*   < >  --    WBC " 10*3/mm3 11.10*   < > 19.84*   < >  --    ALBUMIN g/dL 2.0*  --  2.2*  --   --     < > = values in this interval not displayed.       Results from last 7 days   Lab Units 09/14/23  0751 09/13/23  2242 09/13/23  1610 09/13/23  0448 09/13/23  0005 09/11/23  1841 09/11/23  0640 09/10/23  1945/23  2156   INR   --   --   --   --   --   --  1.16*  --  1.13*   APTT seconds  --   --   --   --   --   --   --   --  28.4   PLATELETS 10*3/mm3 344 399 397 365 381   < > 387   < > 505*    < > = values in this interval not displayed.       COVID19   Date Value Ref Range Status   07/10/2020 Not Detected Not Detected - Ref. Range Final     Lab Results   Component Value Date    HGBA1C 6.0 (H) 11/03/2021          Medications            Scheduled Medications cetirizine, 10 mg, Oral, Daily  desvenlafaxine, 50 mg, Oral, Daily  pantoprazole, 40 mg, Intravenous, Q12H  piperacillin-tazobactam, 3.375 g, Intravenous, Q8H        Infusions      PRN Medications   aluminum-magnesium hydroxide-simethicone    Calcium Replacement - Follow Nurse / BPA Driven Protocol    HYDROmorphone    hydrOXYzine    lactulose    Magnesium Standard Dose Replacement - Follow Nurse / BPA Driven Protocol    melatonin    nitroglycerin    ondansetron **OR** ondansetron    Phosphorus Replacement - Follow Nurse / BPA Driven Protocol    Potassium Replacement - Follow Nurse / BPA Driven Protocol    sodium chloride    traMADol     Physical Findings          General Appearance alert, oriented, room air   Oral/Mouth Cavity tooth or teeth missing   Edema  lower extremity , 1+ (trace)   Gastrointestinal fecal incontinence, hypoactive bowel sounds, last bowel movement: 9/13   Skin  bruising, jaundice   Tubes/Drains/Lines none, drain, Cholecystostomy tube   NFPE Not indicated at this time   --  NUTRITION INTERVENTION / PLAN OF CARE  Intervention Goal         Intervention Goal(s) Maintain nutrition status, Reduce/improve symptoms, Disease management/therapy, Initiate  feeding/diet, and Maintain weight     Nutrition Intervention         RD Action Encourage intake, Continue to monitor, and Care plan reviewed     Nutrition Prescription         Diet Prescription     Supplement Prescription n/a   EN/PN Prescription    New Prescription Ordered? No changes at this time   --  Monitor/Evaluation        Monitor Per protocol, I&O, Pertinent labs, Weight, GI status, Symptoms   Discharge Needs Pending clinical course   Education Will instruct as appropriate   --    RD to follow up per protocol.    Electronically signed by:  Lorelei Diggs RD  09/14/23 13:46 EDT

## 2023-09-14 NOTE — DISCHARGE PLACEMENT REQUEST
"Lizzie Bose (78 y.o. Female)       Date of Birth   1945    Social Security Number       Address   [address] [place] [address]    Home Phone   [phone]    MRN   [id number]       Advent   None    Marital Status                               Admission Date   9/9/23    Admission Type   Emergency    Admitting Provider   Ankur Lehman MD    Attending Provider   Feliciano Staley MD    Department, Room/Bed   [address], [address]/1       Discharge Date       Discharge Disposition       Discharge Destination                                 Attending Provider: Feliciano Staley MD    Allergies: Atorvastatin    Isolation: Contact   Infection: Other (09/12/23)   Code Status: CPR    Ht: 165.1 cm (65\")   Wt: 56.9 kg (125 lb 8 oz)    Admission Cmt: None   Principal Problem: Severe sepsis [A41.9,R65.20]                   Active Insurance as of 9/9/2023       Primary Coverage       Payor Plan Insurance Group Employer/Plan Group    MEDICARE MEDICARE A & B        Payor Plan Address Payor Plan Phone Number Payor Plan Fax Number Effective Dates    [address] [phone]  4/1/2008 - None Entered    [place] [address]         Subscriber Name Subscriber Birth Date Member ID       LIZZIE BOSE 1945 [id number]                     Emergency Contacts        (Rel.) Home Phone Work Phone Mobile Phone    zak bose (Son) -- -- [phone]    JOSE GYISEL (Son) -- -- [phone]    Oma Puentes (Neighbor) -- -- [phone]    Ninoska Haas (Neighbor) -- -- [phone]                "

## 2023-09-14 NOTE — PROGRESS NOTES
Name: Lizzie Bose ADMIT: 2023   : 1945  PCP: Ct Alford APRN    MRN: [id number] LOS: 5 days   AGE/SEX: 78 y.o. female  ROOM: [facility]     Subjective   Subjective   Patient is seen at bedside, no new complaints.        Objective   Objective   Vital Signs  Temp:  [98.2 °F (36.8 °C)-99 °F (37.2 °C)] 99 °F (37.2 °C)  Heart Rate:  [75-98] 75  Resp:  [18] 18  BP: (122-142)/(74-82) 122/74  SpO2:  [95 %-99 %] 95 %  on   ;   Device (Oxygen Therapy): room air  Body mass index is 20.88 kg/m².  Physical Exam  Vitals and nursing note reviewed.   Constitutional:       General: She is not in acute distress.     Appearance: She is ill-appearing. She is not diaphoretic.   HENT:      Head: Atraumatic.   Eyes:      General: Scleral icterus present.   Cardiovascular:      Rate and Rhythm: Normal rate and regular rhythm.      Pulses: Normal pulses.   Pulmonary:      Effort: Pulmonary effort is normal.      Breath sounds: No wheezing.   Abdominal:      General: There is no distension.      Palpations: Abdomen is soft.      Tenderness: There is no abdominal tenderness. There is no guarding or rebound.   Musculoskeletal:         General: No swelling or tenderness.   Skin:     General: Skin is warm and dry.      Coloration: Skin is jaundiced.   Neurological:      Mental Status: She is alert. Mental status is at baseline.   Psychiatric:      Appears to be very confused    Copied text material from yesterday's note has been reviewed for appropriate changes and remains accurate as of 23.      Results Review     I reviewed the patient's new clinical results.  Results from last 7 days   Lab Units 23  0751 23  2242 23  1610 23  0448   WBC 10*3/mm3 11.10* 12.88* 14.91* 19.84*   HEMOGLOBIN g/dL 8.8* 9.6*  9.6* 9.9* 8.9*   PLATELETS 10*3/mm3 344 399 397 365     Results from last 7 days   Lab Units 23  0751 23  0448 09/238 23  0640   SODIUM mmol/L 139 141  --   136 138   POTASSIUM mmol/L 3.3* 4.6  4.6 3.4* 3.1* 3.7   CHLORIDE mmol/L 106 107  --  104 103   CO2 mmol/L 24.4 26.4  --  25.0 26.8   BUN mg/dL 17 12  --  13 12   CREATININE mg/dL 0.54* 0.55*  --  0.60 0.56*   GLUCOSE mg/dL 97 121*  --  138* 123*   EGFR mL/min/1.73 93.2 92.8  --  90.9 92.4     Results from last 7 days   Lab Units 09/14/23 0751 09/13/23 0448 09/12/23 0318 09/11/23  0640   ALBUMIN g/dL 2.0* 2.2* 2.2* 2.6*   BILIRUBIN mg/dL 5.8* 8.4* 5.6* 6.1*   ALK PHOS U/L 628* 770* 618* 269*   AST (SGOT) U/L 146* 265* 133* 104*   ALT (SGPT) U/L 128* 170* 95* 88*     Results from last 7 days   Lab Units 09/14/23  0751 09/13/23 0448 09/12/23 1932 09/12/23 0318 09/11/23  0640   CALCIUM mg/dL 7.9* 8.2* 8.2* 7.8* 8.5*   ALBUMIN g/dL 2.0* 2.2*  --  2.2* 2.6*   MAGNESIUM mg/dL  --   --  1.8  --   --    PHOSPHORUS mg/dL  --  2.9 2.2*  --   --      Results from last 7 days   Lab Units 09/10/23  0701 09/10/23  0359 09/10/23  0054 09/09/23  2156   LACTATE mmol/L 1.8 2.1* 2.7* 3.5*     No results found for: HGBA1C, POCGLU    XR Abdomen 2+ VW with Chest 1 VW    Result Date: 9/12/2023  No acute abnormality.  This report was finalized on 9/12/2023 3:55 PM by Dr. Joel Brewster M.D.      CT Guided Abscess Drain Peritoneal    Result Date: 9/12/2023  Successful cholecystostomy tube placement as described above. Daily flushing (including at home) necessary. Tract maturation requires at least 6 weeks. Cystic duct patency to be assessed prior to potential removal.   This report was finalized on 9/12/2023 6:53 PM by Dr. Judson Stephens M.D.       I have personally reviewed all medications:  Scheduled Medications  cetirizine, 10 mg, Oral, Daily  desvenlafaxine, 50 mg, Oral, Daily  pantoprazole, 40 mg, Intravenous, Q12H  piperacillin-tazobactam, 3.375 g, Intravenous, Q8H    Infusions   Diet  Diet: Regular/House Diet; Texture: Regular Texture (IDDSI 7); Fluid Consistency: Thin (IDDSI 0)    I have personally reviewed:  [x]  Laboratory    [x]  Microbiology   [x]  Radiology   [x]  EKG/Telemetry  [x]  Cardiology/Vascular   []  Pathology    []  Records       Assessment/Plan     Active Hospital Problems    Diagnosis  POA    **Severe sepsis [A41.9, R65.20]  Yes    Pancreatic mass [K86.89]  Yes    Mixed hyperlipidemia [E78.2]  Yes    Benign essential hypertension [I10]  Yes      Resolved Hospital Problems   No resolved problems to display.       78 y.o. female admitted with Severe sepsis.    78 y.o. female admitted with Severe sepsis.     Cholecystitis: Blood cultures are negative. Continue zosyn.  Surgery on board.  Management with cholecystostomy, eventual cholecystectomy. Surgery and GI following.  EPEC diarrhea: Continue Zosyn.  Sepsis: Lactic acidosis improved. Fever last night. Monitoring.  Pancreatic Mass/Obstructive Jaundice: Stent placement last admission. This is presumed pancreatic cancer with high ca19-9 and brushing suspicious for malignancy but not diagnostic. EUS will be needed. Oncology, gastroenterology on board.  GIB: Single bloody bowel movement. PPI. Serial HH. Transfuse if needed.  HTN: Acceptable acutely.  HLD: Statin allergy  Acute confusion, check ammonia levels, consult psychiatry.  PPX: SCD  Disposition: TBD      Feliciano Staley MD  [facility]  09/14/23  14:53 EDT

## 2023-09-14 NOTE — CASE MANAGEMENT/SOCIAL WORK
Continued Stay Note  [facility]     Patient Name: Lizzie Bose  MRN: [id number]  Today's Date: 9/14/2023    Admit Date: 9/9/2023    Plan: SNF - referrals pending   Discharge Plan       Row Name 09/14/23 1529       Plan    Plan SNF - referrals pending    Plan Comments Spoke with son Ari by telephone.  He is aware that patient will need SNF at [place]. He would like referral to [facility] - email to Ariadne, to [facility] - email to Latisha, and to [facility].  Patient will need bx. as OP at [facility].  CCP will follow.  Karsten JIMENEZ                 Expected Discharge Date and Time       Expected Discharge Date Expected Discharge Time    Sep 16, 2023               Becky S. [place], RN

## 2023-09-14 NOTE — PLAN OF CARE
Goal Outcome Evaluation:  Plan of Care Reviewed With: patient        Progress: no change  Outcome Evaluation: VSS; Pt had two dark bloody stools; APRN notified and Hgb checked and stable; IV antibiotics continue; Drain was irrigated; PRN pain med given x1; will continue to monitor

## 2023-09-14 NOTE — PLAN OF CARE
Goal Outcome Evaluation:  Plan of Care Reviewed With: patient        Progress: no change  Outcome Evaluation: No complaints of pain. VSS. Drain to left flank with pruluent brown drainage, flushed with NS. Jaundice persists. Await plans for possible biopsy vs rehab placement. [facility] consult placed. Pt remains confused and agitated at times with flight of ideas. Black tarry stool per [facility]. HGB stable currently. WCTM.

## 2023-09-14 NOTE — THERAPY EVALUATION
Patient Name: Lizzie Bose  : 1945    MRN: [id number]                              Today's Date: 2023       Admit Date: 2023    Visit Dx:     ICD-10-CM ICD-9-CM   1. Severe sepsis  A41.9 038.9    R65.20 995.92   2. Jaundice  R17 782.4   3. Generalized weakness  R53.1 780.79   4. Diarrhea, unspecified type  R19.7 787.91   5. Pancreatic mass  K86.89 577.8     Patient Active Problem List   Diagnosis    PAT (obstructive sleep apnea)    Lumbar pain    Primary osteoarthritis of left knee    Primary osteoarthritis of right knee    Benign essential hypertension    Mixed hyperlipidemia    Vitamin D deficiency    Age-related osteoporosis without current pathological fracture    Hyperinsulinism    Chronic insomnia    Attention deficit hyperactivity disorder (ADHD), combined type    Dyspnea on exertion    Dyslipidemia    Biliary obstruction    Elevated liver enzymes    Pancreatic mass    Severe sepsis     Past Medical History:   Diagnosis Date    ADD (attention deficit disorder)     Arthritis     Depression     High blood pressure     History of Clostridioides difficile infection     Hyperlipidemia     Low back pain     Right knee pain     Sleep apnea     NO USING MACHINE     Past Surgical History:   Procedure Laterality Date    APPENDECTOMY      CARPAL TUNNEL RELEASE Right     CATARACT EXTRACTION Bilateral     ERCP WITH STENT PLACEMENT N/A 2023    Procedure: ENDOSCOPIC RETROGRADE CHOLANGIOPANCREATOGRAPHY WITH SPHINCTEROTOMY; BRUSHING AND STENT PLACEMENT;  Surgeon: Catarino Mg MD;  Location: [facility] ENDOSCOPY;  Service: Gastroenterology;  Laterality: N/A;  pre-biliary obstruction  post-    HAMMER TOE REPAIR Left     HIP ARTHROPLASTY Left     TOTAL KNEE ARTHROPLASTY Right 2020    Procedure: TOTAL KNEE ARTHROPLASTY;  Surgeon: Krish Ramirez MD;  Location: [facility] OR;  Service: Orthopedics;  Laterality: Right;      General Information       Row Name 23 1500          Physical Therapy  Time and Intention    Document Type evaluation  -EM     Mode of Treatment individual therapy;physical therapy  -EM       Row Name 09/14/23 1503          General Information    Patient Profile Reviewed yes  -EM     Prior Level of Function independent:  -EM     Existing Precautions/Restrictions fall  -EM       Row Name 09/14/23 1503          Living Environment    People in Home alone  -EM       Row Name 09/14/23 1503          Cognition    Orientation Status (Cognition) oriented x 3  -EM       Row Name 09/14/23 1503          Safety Issues, Functional Mobility    Safety Issues Affecting Function (Mobility) insight into deficits/self-awareness;problem-solving  -EM     Impairments Affecting Function (Mobility) cognition;endurance/activity tolerance;strength  -EM               User Key  (r) = Recorded By, (t) = Taken By, (c) = Cosigned By      Initials Name Provider Type    Stephanie Corona PT Physical Therapist                   Mobility       Row Name 09/14/23 1504          Bed Mobility    Bed Mobility supine-sit;sit-supine  -EM     Supine-Sit [place] (Bed Mobility) supervision  -EM     Sit-Supine [place] (Bed Mobility) supervision  -EM     Comment, (Bed Mobility) pt able to scoot and reposition in bed independently  -EM       Row Name 09/14/23 1504          Sit-Stand Transfer    Sit-Stand [place] (Transfers) standby assist  -EM       Row Name 09/14/23 1504          Gait/Stairs (Locomotion)    [place] Level (Gait) contact guard  -EM     Distance in Feet (Gait) 15  -EM     Deviations/Abnormal Patterns (Gait) gait speed decreased  -EM     Comment, (Gait/Stairs) pt states she can walk further but declined to leave room, c/o diarrhea limiting  -EM               User Key  (r) = Recorded By, (t) = Taken By, (c) = Cosigned By      Initials Name Provider Type    Stephanie Corona PT Physical Therapist                   Obj/Interventions       Row Name 09/14/23 1506          Range of Motion  Comprehensive    General Range of Motion no range of motion deficits identified  -EM       Row Name 09/14/23 1506          Strength Comprehensive (MMT)    General Manual Muscle Testing (MMT) Assessment other (see comments)  -EM     Comment, General Manual Muscle Testing (MMT) Assessment no focal deficits identified, generalized weakness noted  -EM       Row Name 09/14/23 1506          Balance    Balance Assessment sitting static balance;sitting dynamic balance;standing static balance;standing dynamic balance  -EM     Static Sitting Balance supervision  -EM     Dynamic Sitting Balance supervision  -EM     Position, Sitting Balance sitting edge of bed  -EM     Static Standing Balance standby assist  -EM     Dynamic Standing Balance contact guard  -EM       Row Name 09/14/23 1506          Sensory Assessment (Somatosensory)    Sensory Assessment (Somatosensory) sensation intact  -EM               User Key  (r) = Recorded By, (t) = Taken By, (c) = Cosigned By      Initials Name Provider Type    EM Stephanie Orta, PT Physical Therapist                   Goals/Plan       Row Name 09/14/23 1511          Transfer Goal 1 (PT)    Activity/Assistive Device (Transfer Goal 1, PT) transfers, all  -EM     [place] Level/Cues Needed (Transfer Goal 1, PT) supervision required  -EM     Time Frame (Transfer Goal 1, PT) 1 week  -EM       Row Name 09/14/23 1511          Gait Training Goal 1 (PT)    Activity/Assistive Device (Gait Training Goal 1, PT) gait (walking locomotion)  -EM     [place] Level (Gait Training Goal 1, PT) standby assist  -EM     Distance (Gait Training Goal 1, PT) 150  -EM     Time Frame (Gait Training Goal 1, PT) 1 week  -EM       Row Name 09/14/23 1511          Therapy Assessment/Plan (PT)    Planned Therapy Interventions (PT) gait training;home exercise program;patient/family education;transfer training  -EM               User Key  (r) = Recorded By, (t) = Taken By, (c) = Cosigned By      Initials  Name Provider Type    Stephanie Corona, PT Physical Therapist                   Clinical Impression       Row Name 09/14/23 1507          Pain    Pretreatment Pain Rating 0/10 - no pain  -EM       Row Name 09/14/23 1501          Plan of Care Review    Plan of Care Reviewed With patient  -EM     Outcome Evaluation Pt is 77 yo female admitted to [facility] for sepsis, pancreatic mass. PMH significant for recent hospitalization for obstructive jaundice s/p ERCP, peritoneal abscess, HTN, HLD. Patient lives alone, independent prior to admission per pt report. Patient performed bed mobility with supervision, sit to stand with SBA, ambulated short distance in room with CGA, stating she was limited due to diarrhea. Pt demonstrates impairments consisting of generalized weakness, decreased activity tolerance, decreased safety awareness and may benefit from skilled PT. Patient does not appear safe to return home alone.  -EM       Row Name 09/14/23 1500          Therapy Assessment/Plan (PT)    Patient/Family Therapy Goals Statement (PT) go home  -EM     Rehab Potential (PT) good, to achieve stated therapy goals  -EM     Criteria for Skilled Interventions Met (PT) yes;skilled treatment is necessary  -EM     Therapy Frequency (PT) 3 times/wk  -EM       Row Name 09/14/23 1507          Positioning and Restraints    Pre-Treatment Position in bed  -EM     Post Treatment Position bed  -EM     In Bed supine;call light within reach;exit alarm on;notified nsg  -EM               User Key  (r) = Recorded By, (t) = Taken By, (c) = Cosigned By      Initials Name Provider Type    Stephanie Corona, PT Physical Therapist                   Outcome Measures       Row Name 09/14/23 1512 09/14/23 0934       How much help from another person do you currently need...    Turning from your back to your side while in flat bed without using bedrails? 4  -EM 4  -DS    Moving from lying on back to sitting on the side of a flat bed without bedrails?  4  -EM 4  -DS    Moving to and from a bed to a chair (including a wheelchair)? 3  -EM 3  -DS    Standing up from a chair using your arms (e.g., wheelchair, bedside chair)? 3  -EM 3  -DS    Climbing 3-5 steps with a railing? 3  -EM 3  -DS    To walk in hospital room? 3  -EM 3  -DS    AM-PAC 6 Clicks Score (PT) 20  -EM 20  -DS              User Key  (r) = Recorded By, (t) = Taken By, (c) = Cosigned By      Initials Name Provider Type    EM Stephanie Orta, PT Physical Therapist    Pema Womack, RN Registered Nurse                                 Physical Therapy Education       Title: PT OT SLP Therapies (In Progress)       Topic: Physical Therapy (In Progress)       Point: Mobility training (Done)       Learning Progress Summary             Patient Acceptance, E, VU,NR by EM at 9/14/2023 1512                         Point: Home exercise program (Not Started)       Learner Progress:  Not documented in this visit.              Point: Body mechanics (Not Started)       Learner Progress:  Not documented in this visit.              Point: Precautions (Not Started)       Learner Progress:  Not documented in this visit.                              User Key       Initials Effective Dates Name Provider Type Discipline     06/16/21 -  Stephanie Orta PT Physical Therapist PT                  PT Recommendation and Plan  Planned Therapy Interventions (PT): gait training, home exercise program, patient/family education, transfer training  Plan of Care Reviewed With: patient  Outcome Evaluation: Pt is 77 yo female admitted to [facility] for sepsis, pancreatic mass. PMH significant for recent hospitalization for obstructive jaundice s/p ERCP, peritoneal abscess, HTN, HLD. Patient lives alone, independent prior to admission per pt report. Patient performed bed mobility with supervision, sit to stand with SBA, ambulated short distance in room with CGA, stating she was limited due to diarrhea. Pt demonstrates impairments  consisting of generalized weakness, decreased activity tolerance, decreased safety awareness and may benefit from skilled PT. Patient does not appear safe to return home alone.     Time Calculation:         PT Charges       Row Name 09/14/23 1514             Time Calculation    Start Time 1409  -EM      Stop Time 1429  -EM      Time Calculation (min) 20 min  -EM      PT Received On 09/14/23  -EM      PT - Next Appointment 09/15/23  -EM      PT Goal Re-Cert Due Date 09/21/23  -EM         Time Calculation- PT    Total Timed Code Minutes- PT 12 minute(s)  -EM         Timed Charges    24761 - PT Therapeutic Activity Minutes 12  -EM         Total Minutes    Timed Charges Total Minutes 12  -EM       Total Minutes 12  -EM                User Key  (r) = Recorded By, (t) = Taken By, (c) = Cosigned By      Initials Name Provider Type    EM Stephanie Orta, PT Physical Therapist                  Therapy Charges for Today       Code Description Service Date Service Provider Modifiers Qty    99259980916 HC PT THERAPEUTIC ACT EA 15 MIN 9/14/2023 Stephanie Orta, PT GP 1    61846876468 HC PT EVAL MOD COMPLEXITY 2 9/14/2023 Stephanie Orta, PT GP 1            PT G-Codes  AM-PAC 6 Clicks Score (PT): 20  PT Discharge Summary  Anticipated Discharge Disposition (PT): home with assist, skilled nursing facility    Stephanie Orta PT  9/14/2023

## 2023-09-14 NOTE — PROGRESS NOTES
Subjective     CHIEF COMPLAINT:     Pancreatic mass    INTERVAL HISTORY:     Patient reports improvement in the abdominal pain.  She is complaining of difficulty with sleep.    REVIEW OF SYSTEMS:  A comprehensive review of systems was obtained with pertinent positive findings as noted in the interval history above.  All other systems negative.    SCHEDULED MEDS:  cetirizine, 10 mg, Oral, Daily  desvenlafaxine, 50 mg, Oral, Daily  pantoprazole, 40 mg, Intravenous, Q12H  piperacillin-tazobactam, 3.375 g, Intravenous, Q8H  potassium chloride ER, 40 mEq, Oral, Q4H      INFUSIONS:       Objective   VITAL SIGNS:  Temp:  [98.1 °F (36.7 °C)-99 °F (37.2 °C)] 99 °F (37.2 °C)  Heart Rate:  [75-98] 75  Resp:  [18] 18  BP: (116-142)/() 122/74     PHYSICAL EXAMINATION:    GENERAL: Weak.  She is in pain.  SKIN: No skin rash. No ecchymosis.   HEAD:  Normocephalic.  EYES: Jaundice.  Pallor.  NECK:  Supple. No Masses.  LYMPHATICS:  No cervical or supraclavicular lymphadenopathy.  CHEST: Normal respiratory effort. Lungs clear to auscultation.   CARDIAC:  Normal S1 & S2. No murmur.   ABDOMEN: Decrease in the tenderness in the right upper quadrant.  Cholecystostomy tube is functional.  EXTREMITIES:  No edema.  PSYCH: Anxious.    RESULT REVIEW:   Results from last 7 days   Lab Units 09/14/23  0751 09/13/23  2242 09/13/23  1610 09/13/23  0448 09/13/23  0005 09/12/23  1557 09/12/23  0318 09/11/23  2357 09/11/23  1841 09/11/23  0640 09/10/23  1945   WBC 10*3/mm3 11.10* 12.88* 14.91* 19.84* 19.94*   < > 18.71*   < > 17.67* 19.63* 19.58*   NEUTROS ABS 10*3/mm3 9.06*  --   --  17.86*  --   --  17.01*  --  16.26* 17.27* 16.96*   LYMPHS ABS 10*3/mm3  --   --   --  0.99  --   --  1.14  --  1.06 0.79 0.61*   HEMOGLOBIN g/dL 8.8* 9.6*  9.6* 9.9* 8.9* 9.5*   < > 7.5*   < > 8.0* 8.6* 8.7*   HEMATOCRIT % 25.8* 27.9*  27.9* 28.5* 26.7* 27.9*   < > 22.9*   < > 23.7* 25.4* 25.8*   PLATELETS 10*3/mm3 344 399 397 365 381   < > 403   < > 380 387 373     < > = values in this interval not displayed.     Results from last 7 days   Lab Units 09/14/23  0751 09/13/23  0448 09/12/23  1932 09/12/23  0318 09/11/23  0640 09/10/23  1812   SODIUM mmol/L 139 141  --  136 138 131*   POTASSIUM mmol/L 3.3* 4.6  4.6 3.4* 3.1* 3.7 3.9   CHLORIDE mmol/L 106 107  --  104 103 99   CO2 mmol/L 24.4 26.4  --  25.0 26.8 19.9*   BUN mg/dL 17 12  --  13 12 14   CREATININE mg/dL 0.54* 0.55*  --  0.60 0.56* 0.59   CALCIUM mg/dL 7.9* 8.2* 8.2* 7.8* 8.5* 8.4*   ALBUMIN g/dL 2.0* 2.2*  --  2.2* 2.6* 2.1*   BILIRUBIN mg/dL 5.8* 8.4*  --  5.6* 6.1* 5.6*   ALK PHOS U/L 628* 770*  --  618* 269* 146*   ALT (SGPT) U/L 128* 170*  --  95* 88* 88*   AST (SGOT) U/L 146* 265*  --  133* 104* 100*   MAGNESIUM mg/dL  --   --  1.8  --   --   --      Results from last 7 days   Lab Units 09/11/23  0640 09/09/23  2156   INR  1.16* 1.13*   APTT seconds  --  28.4         Lab 09/12/23 0319 09/12/23 0318   IRON  --  36*   IRON SATURATION (TSAT)  --  20   TIBC  --  183*   TRANSFERRIN  --  123*   FERRITIN  --  915.00*   FOLATE 9.61  --    VITAMIN B 12 935  --         Assessment & Plan   *Suspected pancreatic mass on CT on 8/26/2023.  Patient had biliary obstruction with severe intra and extrahepatic biliary ductal dilatation.  The common bile duct was abruptly decompressed.  This was considered to be concerning for pancreatic malignancy.  MRI abdomen on 8/27/2023 revealed a hypoenhancing pancreatic mass within the uncinate process and pancreatic head resulting in obstruction of the common bile duct and severe upstream intra and extrahepatic biliary ductal dilatation.  S/p ERCP with biliary duct stent placement on 8/29/2023.  Cytology from 8/29/2023 revealed scattered atypical ductal/glandular cells suspicious for well-differentiated neoplasm.  This was considered nondiagnostic of neoplasm.  CT on 9/10/2023 revealed interval placement of the biliary stent with resolution of the biliary duct dilatation. No  pancreatic mass was identified.  Patient has cholecystostomy tube placed on 9/12/2023.  Bilirubin decreased since the placement of the jejunostomy tube.     *Left ovarian/adnexal cyst.  It was first identified on CT on 8/26/2023 measuring 5.7 cm.  Ultrasound on 8/30/2023 showed the lesion to have suspicious features.  There was endometrial thickening.  CT on 9/10/2023 showed the left adnexal lesion to be stable     *Anemia.  9/9/2023: Hemoglobin 11.9.    9/11/2023: Hemoglobin decreased to 8.6.  9/12/2023: Hemoglobin decreased to 7.5.  S/p 1 unit PRBC transfusion.  Anemia work-up showed no evidence of iron vitamin B12 or folate deficiency.  9/13/2023: Hemoglobin improved to 9.9.  9/14/2023: Hemoglobin decreased to 8.8.  ?  GI bleeding.  GI is following and indicated that they may do EGD if open continues to decline.     PLAN:     1.  Monitor CBC.  2.  Since she is unable to have EUS at this hospital, plan is for referral to [facility] to have the EUS and possible biopsy.          Kate Mar MD  09/14/23

## 2023-09-14 NOTE — PROGRESS NOTES
Follow-up cholecystitis/jaundice    Subjective:  Overall she looks little bit better.  Seems to be tolerating a diet reasonably well.  Denies much abdominal pain.    Objective:  Afebrile, heart rate 70s  General: Awake and alert, chronic ill but no acute distress, anxious  Eyes: Significant jaundice persists, perhaps slightly better  Abdomen: Soft, mildly distended, minimal tenderness.  Cholecystostomy tube is in place draining dark-colored bile, approximately 100 cc out yesterday    Labs are reviewed, white blood cell count has improved substantially over the last couple of days, down to 11.1 currently, hemoglobin 8.8.  Total bilirubin at 5.8.    Assessment and plan:  -Presumed cholecystitis, clinically better with cholecystostomy tube, will need to keep for approximately 6 weeks  -Pancreatic mass, overall clinical picture extremely concerning for pancreatic malignancy, now status post ERCP with improvement of jaundice.  Brushings did not clearly define her disease, she needs an endoscopic ultrasound which unfortunately is not available at this facility.  I have placed a request for outpatient follow-up with Dr. Morfin to have an endoscopic ultrasound and hopefully biopsy for definitive tissue diagnosis, at which time further management decisions can be made with regard to chemotherapy versus surgical intervention versus no treatment.    Chase Nur MD  General and Endoscopic Surgery  [facility]    [address], [address]  [place], [place], [address]  P: [phone]  F: [phone]

## 2023-09-14 NOTE — PLAN OF CARE
Goal Outcome Evaluation:  Plan of Care Reviewed With: patient           Outcome Evaluation: Pt is 77 yo female admitted to [facility] for sepsis, pancreatic mass. PMH significant for recent hospitalization for obstructive jaundice s/p ERCP, peritoneal abscess, HTN, HLD. Patient lives alone, independent prior to admission per pt report. Patient performed bed mobility with supervision, sit to stand with SBA, ambulated short distance in room with CGA, stating she was limited due to diarrhea. Pt demonstrates impairments consisting of generalized weakness, decreased activity tolerance, decreased safety awareness and may benefit from skilled PT. Patient does not appear safe to return home alone.      Anticipated Discharge Disposition (PT): home with assist, skilled nursing facility

## 2023-09-14 NOTE — CONSULTS
"IDENTIFYING INFORMATION: The patient is an 78-year-old  white female admitted with abdominal pain and jaundice.  She is seen by psychiatry related to history of depression    CHIEF COMPLAINT: None given    INFORMANT: Patient and chart    RELIABILITY: Good    HISTORY OF PRESENT ILLNESS: The patient is an 78-year-old  white female admitted with abdominal pain and jaundice on 9/9/2023.  The patient was found to have a pancreatic mass with biliary obstruction causing jaundice.  She is also being treated for sepsis.  The patient was reportedly seen by Dr. Booker for many years and had been prescribed Pristiq and had been on psychostimulants for some time.  She reports that she has been off these medications for some time and does not wish to to reinitiate them at this point.  She denies current suicidal or homicidal ideation but reports that she has had some difficulty dealing with her current medical situation.  The patient presents a very interesting social history.  She reports that she went to [place] during the \"summer of love\" and speaks very highly of that experience.  She reports that while there she learned various spiritual means to deal with anxiety and depression and states that she does not wish to restart any psychotropic medications at this time.  She is a retired psychiatric nurse.  She denies abuse of any psychoactive substances.    PAST PSYCHIATRIC HISTORY: As noted previously, the patient had been followed by Dr. Peter Booker in the past and had been prescribed Pristiq and Adderall.  She does not wish to restart these medications.  She denies any history of suicide attempts or gestures and was never psychiatric hospitalized.    PAST MEDICAL HISTORY: Significant for arthritis, hypertension, history of C. difficile infection, hyperlipidemia, sleep apnea    MEDICATIONS:   Current Facility-Administered Medications   Medication Dose Route Frequency Provider Last Rate Last Admin    " aluminum-magnesium hydroxide-simethicone (MAALOX MAX) 400-400-40 MG/5ML suspension 15 mL  15 mL Oral Q6H PRN Sussy Crawley APRN        Calcium Replacement - Follow Nurse / BPA Driven Protocol   Does not apply PRN Ankur Lehman MD        cetirizine (zyrTEC) tablet 10 mg  10 mg Oral Daily Ankur Lehman MD   10 mg at 09/14/23 0941    desvenlafaxine (PRISTIQ) 24 hr tablet 50 mg  50 mg Oral Daily Ankur Lehman MD   50 mg at 09/14/23 0941    HYDROmorphone (DILAUDID) injection 0.5 mg  0.5 mg Intravenous Q2H PRN Ankur Lehman MD   0.5 mg at 09/13/23 2331    hydrOXYzine (ATARAX) tablet 25 mg  25 mg Oral TID PRN Ankur Lehman MD        lactulose (CHRONULAC) 10 GM/15ML solution 10 g  10 g Oral BID PRN Ankur Lehman MD        Magnesium Standard Dose Replacement - Follow Nurse / BPA Driven Protocol   Does not apply PRN Ankur Lehman MD        melatonin tablet 3 mg  3 mg Oral Nightly PRN Sussy Crawley APRN        nitroglycerin (NITROSTAT) SL tablet 0.4 mg  0.4 mg Sublingual Q5 Min PRN Sussy Crawley APRN        ondansetron (ZOFRAN) tablet 4 mg  4 mg Oral Q6H PRN Sussy Crawley APRN        Or    ondansetron (ZOFRAN) injection 4 mg  4 mg Intravenous Q6H PRN Sussy Crawley APRN        pantoprazole (PROTONIX) injection 40 mg  40 mg Intravenous Q12H Ankur Lehman MD   40 mg at 09/14/23 0941    Phosphorus Replacement - Follow Nurse / BPA Driven Protocol   Does not apply PRN Ankur Lehman MD        piperacillin-tazobactam (ZOSYN) 3.375 g in iso-osmotic dextrose 50 ml (premix)  3.375 g Intravenous Q8H Feliciano Staley MD   3.375 g at 09/14/23 0605    potassium chloride (K-DUR,KLOR-CON) ER tablet 40 mEq  40 mEq Oral Q4H Feliciano Staley MD   40 mEq at 09/14/23 0941    Potassium Replacement - Follow Nurse / BPA Driven Protocol   Does not apply PRN Ankur Lehman MD        sodium chloride 0.9 % flush 10 mL  10 mL Intravenous PRN Sussy Crawley,  APRN   10 mL at 09/11/23 2112    traMADol (ULTRAM) tablet 50 mg  50 mg Oral Q4H PRN Ankur Lehman MD         Facility-Administered Medications Ordered in Other Encounters   Medication Dose Route Frequency Provider Last Rate Last Admin    Chlorhexidine Gluconate 2 % pads 2 each  2 pad  Apply externally BID Krish Ramirez MD         Field    ALLERGIES: Atorvastatin    FAMILY HISTORY: Noncontributory    SOCIAL HISTORY: The patient is .  She is a retired psychiatric nurse.  She denies abuse of any psychoactive substances.    MENTAL STATUS EXAM: The patient is an elderly icteric white female appearing her stated age.  She is in no apparent physical distress at the time of examination.  She is awake alert and oriented in all spheres.  Her mood is euthymic her affect congruent.  Speech is relevant and coherent.  There are no deficits memory or cognition noted.  Intelligence is judged to be in the average range based on fund of knowledge, the patient is cooperative about the interview.  She denies current suicidal or homicidal ideation or psychotic features.  Her judgment and insight are intact.    ASSETS/LIABILITIES: Motivation for change, high level of spirituality/advancing health issues    DIAGNOSTIC IMPRESSION: Adjustment disorder with depressed mood, medical problems as noted previously    PLAN: At this point, the patient states that she does not wish to restart Pristiq or any other depressive medication stating that she will use tools and spirituality which she learned in the 1960s to deal with her current anxiety and depressive symptoms.  We had a very pleasant conversation and I am in full agreement with her decision not to reinitiate pharmacotherapy at least at this point.

## 2023-09-14 NOTE — CONSULTS
"Purpose of the visit was to evaluate for: goals of care/advanced care planning and support for patient/family. Spoke with RN and CCP as well as patient and discussed palliative care.      Assessment:  Patient is palliative care appropriate given severe sepsis, pancreatic mass/obstructive jaundice, cholecystitis, cholecystostomy tube in place. Unable to assess to patient as she requested not to meet with palliative team upon entering room.     Recommendations/Plan: Patient declined meeting, lvm for son. Will sign off.     Other Comments:   When introducing self to patient, she requested to not meet. Patient noted that she had talked with hospice yesterday and feels it will \"just stir up the pot\" and stated that her contact information is \"in the computer for you all to contact in the future\" regarding hospice services. Called and lvm for son to notify him of attempted visit and provide contact information for  palliative team should they need information in the future about palliative/hospice services. Updated RN and CCP.         ADDENDUM: [phone] Son Ari returned call. He stated that it is his understanding that patient likely has pancreatic cancer but they need a biopsy to determine dx and any possible treatment options. Son noted that patient and family want to pursue biopsy to confirm diagnosis and he  patient wants to do treatment to an extent. Addressed CODE STATUS. Son reported that he and patient discussed this recently and patient confirmed she wants to be a FULL CODE. Son is concerned about patient's ability to care well and safely for herself at home. He is wanting to speak with d/c planning regarding d/c disposition of possible skilled nursing care options.     Son asking appropriate questions about palliative care vs hospice care. Provided education about hospice services,  palliative care, and outpatient palliative care ([facility]). He would like a formal hospice consult from physician so hospice can " continue to follow along in patient's care. He is also agreeable to [facility] referral since outpatient palliative services align with their goals of care as they want to pursue work-up and treatment. Will complete [facility] referral. Provided support and advised of availability. Updated RN Pema, LOAN Gonzalez, and Dr. Staley of discussion.

## 2023-09-15 ENCOUNTER — TELEPHONE (OUTPATIENT)
Dept: GASTROENTEROLOGY | Facility: CLINIC | Age: 78
End: 2023-09-15
Payer: MEDICARE

## 2023-09-15 LAB
ALBUMIN SERPL-MCNC: 2.1 G/DL (ref 3.5–5.2)
ALBUMIN/GLOB SERPL: 0.6 G/DL
ALP SERPL-CCNC: 482 U/L (ref 39–117)
ALT SERPL W P-5'-P-CCNC: 88 U/L (ref 1–33)
ANION GAP SERPL CALCULATED.3IONS-SCNC: 8 MMOL/L (ref 5–15)
AST SERPL-CCNC: 66 U/L (ref 1–32)
BASOPHILS # BLD AUTO: 0.04 10*3/MM3 (ref 0–0.2)
BASOPHILS NFR BLD AUTO: 0.4 % (ref 0–1.5)
BILIRUB SERPL-MCNC: 4.3 MG/DL (ref 0–1.2)
BUN SERPL-MCNC: 14 MG/DL (ref 8–23)
BUN/CREAT SERPL: 25 (ref 7–25)
CALCIUM SPEC-SCNC: 8.1 MG/DL (ref 8.6–10.5)
CHLORIDE SERPL-SCNC: 104 MMOL/L (ref 98–107)
CO2 SERPL-SCNC: 23 MMOL/L (ref 22–29)
CREAT SERPL-MCNC: 0.56 MG/DL (ref 0.57–1)
DEPRECATED RDW RBC AUTO: 52.2 FL (ref 37–54)
EGFRCR SERPLBLD CKD-EPI 2021: 92.4 ML/MIN/1.73
EOSINOPHIL # BLD AUTO: 0.15 10*3/MM3 (ref 0–0.4)
EOSINOPHIL NFR BLD AUTO: 1.4 % (ref 0.3–6.2)
ERYTHROCYTE [DISTWIDTH] IN BLOOD BY AUTOMATED COUNT: 15.1 % (ref 12.3–15.4)
GLOBULIN UR ELPH-MCNC: 3.3 GM/DL
GLUCOSE SERPL-MCNC: 96 MG/DL (ref 65–99)
HCT VFR BLD AUTO: 26.9 % (ref 34–46.6)
HGB BLD-MCNC: 9.1 G/DL (ref 12–15.9)
IMM GRANULOCYTES # BLD AUTO: 0.13 10*3/MM3 (ref 0–0.05)
IMM GRANULOCYTES NFR BLD AUTO: 1.2 % (ref 0–0.5)
LYMPHOCYTES # BLD AUTO: 1.79 10*3/MM3 (ref 0.7–3.1)
LYMPHOCYTES NFR BLD AUTO: 17.1 % (ref 19.6–45.3)
MCH RBC QN AUTO: 31.8 PG (ref 26.6–33)
MCHC RBC AUTO-ENTMCNC: 33.8 G/DL (ref 31.5–35.7)
MCV RBC AUTO: 94.1 FL (ref 79–97)
MONOCYTES # BLD AUTO: 0.52 10*3/MM3 (ref 0.1–0.9)
MONOCYTES NFR BLD AUTO: 5 % (ref 5–12)
NEUTROPHILS NFR BLD AUTO: 7.82 10*3/MM3 (ref 1.7–7)
NEUTROPHILS NFR BLD AUTO: 74.9 % (ref 42.7–76)
NRBC BLD AUTO-RTO: 0.2 /100 WBC (ref 0–0.2)
PLATELET # BLD AUTO: 354 10*3/MM3 (ref 140–450)
PMV BLD AUTO: 9.2 FL (ref 6–12)
POTASSIUM SERPL-SCNC: 3.5 MMOL/L (ref 3.5–5.2)
POTASSIUM SERPL-SCNC: 4.3 MMOL/L (ref 3.5–5.2)
PROT SERPL-MCNC: 5.4 G/DL (ref 6–8.5)
RBC # BLD AUTO: 2.86 10*6/MM3 (ref 3.77–5.28)
SODIUM SERPL-SCNC: 135 MMOL/L (ref 136–145)
WBC NRBC COR # BLD: 10.45 10*3/MM3 (ref 3.4–10.8)

## 2023-09-15 PROCEDURE — 84132 ASSAY OF SERUM POTASSIUM: CPT | Performed by: INTERNAL MEDICINE

## 2023-09-15 PROCEDURE — 97530 THERAPEUTIC ACTIVITIES: CPT

## 2023-09-15 PROCEDURE — 80053 COMPREHEN METABOLIC PANEL: CPT | Performed by: INTERNAL MEDICINE

## 2023-09-15 PROCEDURE — 99232 SBSQ HOSP IP/OBS MODERATE 35: CPT | Performed by: NURSE PRACTITIONER

## 2023-09-15 PROCEDURE — 85025 COMPLETE CBC W/AUTO DIFF WBC: CPT | Performed by: INTERNAL MEDICINE

## 2023-09-15 PROCEDURE — 25010000002 PIPERACILLIN SOD-TAZOBACTAM PER 1 G: Performed by: INTERNAL MEDICINE

## 2023-09-15 RX ORDER — POTASSIUM CHLORIDE 750 MG/1
40 TABLET, FILM COATED, EXTENDED RELEASE ORAL EVERY 4 HOURS
Status: COMPLETED | OUTPATIENT
Start: 2023-09-15 | End: 2023-09-15

## 2023-09-15 RX ADMIN — CETIRIZINE HYDROCHLORIDE 10 MG: 10 TABLET ORAL at 08:52

## 2023-09-15 RX ADMIN — Medication 10 ML: at 08:53

## 2023-09-15 RX ADMIN — PIPERACILLIN SODIUM AND TAZOBACTAM SODIUM 3.38 G: 3; .375 INJECTION, SOLUTION INTRAVENOUS at 22:23

## 2023-09-15 RX ADMIN — PANTOPRAZOLE SODIUM 40 MG: 40 INJECTION, POWDER, FOR SOLUTION INTRAVENOUS at 08:52

## 2023-09-15 RX ADMIN — DESVENLAFAXINE SUCCINATE 50 MG: 50 TABLET, EXTENDED RELEASE ORAL at 08:52

## 2023-09-15 RX ADMIN — PANTOPRAZOLE SODIUM 40 MG: 40 INJECTION, POWDER, FOR SOLUTION INTRAVENOUS at 22:23

## 2023-09-15 RX ADMIN — PIPERACILLIN SODIUM AND TAZOBACTAM SODIUM 3.38 G: 3; .375 INJECTION, SOLUTION INTRAVENOUS at 06:16

## 2023-09-15 RX ADMIN — POTASSIUM CHLORIDE 40 MEQ: 750 TABLET, EXTENDED RELEASE ORAL at 08:52

## 2023-09-15 RX ADMIN — PIPERACILLIN SODIUM AND TAZOBACTAM SODIUM 3.38 G: 3; .375 INJECTION, SOLUTION INTRAVENOUS at 14:02

## 2023-09-15 RX ADMIN — POTASSIUM CHLORIDE 40 MEQ: 750 TABLET, EXTENDED RELEASE ORAL at 12:56

## 2023-09-15 NOTE — PROGRESS NOTES
Name: Lizzie Bose ADMIT: 2023   : 1945  PCP: Ct Alford APRN    MRN: [id number] LOS: 6 days   AGE/SEX: 78 y.o. female  ROOM: [facility]     Subjective   Subjective   Patient is seen at bedside, no new complaints.       Objective   Objective   Vital Signs  Temp:  [98.2 °F (36.8 °C)-98.6 °F (37 °C)] 98.2 °F (36.8 °C)  Heart Rate:  [62-90] 75  Resp:  [18] 18  BP: (137-158)/(71-91) 147/71  SpO2:  [96 %-100 %] 97 %  on   ;   Device (Oxygen Therapy): room air  Body mass index is 20.88 kg/m².  Physical Exam  Vitals and nursing note reviewed.   Constitutional:       General: She is not in acute distress.     Appearance: She is ill-appearing. She is not diaphoretic.   HENT:      Head: Atraumatic.   Eyes:      General: Scleral icterus present.   Cardiovascular:      Rate and Rhythm: Normal rate and regular rhythm.      Pulses: Normal pulses.   Pulmonary:      Effort: Pulmonary effort is normal.      Breath sounds: No wheezing.   Abdominal:      General: There is no distension.      Palpations: Abdomen is soft.      Tenderness: There is no abdominal tenderness. There is no guarding or rebound.   Musculoskeletal:         General: No swelling or tenderness.   Skin:     General: Skin is warm and dry.      Coloration: Skin is jaundiced.   Neurological:      Mental Status: She is alert. Mental status is at baseline.   Psychiatric:      Appears to be very confused     Copied text material from yesterday's note has been reviewed for appropriate changes and remains accurate as of 9/15/23.      Results Review     I reviewed the patient's new clinical results.  Results from last 7 days   Lab Units 09/15/23  0635 23  0751 23  2242 23  1610   WBC 10*3/mm3 10.45 11.10* 12.88* 14.91*   HEMOGLOBIN g/dL 9.1* 8.8* 9.6*  9.6* 9.9*   PLATELETS 10*3/mm3 354 344 399 397     Results from last 7 days   Lab Units 09/15/23  0635 23  1858 23  0751 23  0448 23  1932 23  0318   SODIUM  mmol/L 135*  --  139 141  --  136   POTASSIUM mmol/L 3.5 3.9 3.3* 4.6  4.6   < > 3.1*   CHLORIDE mmol/L 104  --  106 107  --  104   CO2 mmol/L 23.0  --  24.4 26.4  --  25.0   BUN mg/dL 14  --  17 12  --  13   CREATININE mg/dL 0.56*  --  0.54* 0.55*  --  0.60   GLUCOSE mg/dL 96  --  97 121*  --  138*   EGFR mL/min/1.73 92.4  --  93.2 92.8  --  90.9    < > = values in this interval not displayed.     Results from last 7 days   Lab Units 09/15/23  0635 09/14/23 0751 09/13/23 0448 09/12/23  0318   ALBUMIN g/dL 2.1* 2.0* 2.2* 2.2*   BILIRUBIN mg/dL 4.3* 5.8* 8.4* 5.6*   ALK PHOS U/L 482* 628* 770* 618*   AST (SGOT) U/L 66* 146* 265* 133*   ALT (SGPT) U/L 88* 128* 170* 95*     Results from last 7 days   Lab Units 09/15/23  0635 09/14/23 0751 09/13/23 0448 09/12/23  1932 09/12/23  0318   CALCIUM mg/dL 8.1* 7.9* 8.2* 8.2* 7.8*   ALBUMIN g/dL 2.1* 2.0* 2.2*  --  2.2*   MAGNESIUM mg/dL  --   --   --  1.8  --    PHOSPHORUS mg/dL  --   --  2.9 2.2*  --      Results from last 7 days   Lab Units 09/10/23  0701 09/10/23  0359 09/10/23  0054 09/09/23  2156   LACTATE mmol/L 1.8 2.1* 2.7* 3.5*     No results found for: HGBA1C, POCGLU    No radiology results for the last day    I have personally reviewed all medications:  Scheduled Medications  cetirizine, 10 mg, Oral, Daily  desvenlafaxine, 50 mg, Oral, Daily  pantoprazole, 40 mg, Intravenous, Q12H  piperacillin-tazobactam, 3.375 g, Intravenous, Q8H    Infusions   Diet  Diet: Regular/House Diet; Texture: Regular Texture (IDDSI 7); Fluid Consistency: Thin (IDDSI 0)    I have personally reviewed:  [x]  Laboratory   [x]  Microbiology   [x]  Radiology   [x]  EKG/Telemetry  [x]  Cardiology/Vascular   []  Pathology    []  Records       Assessment/Plan     Active Hospital Problems    Diagnosis  POA    **Severe sepsis [A41.9, R65.20]  Yes    Pancreatic mass [K86.89]  Yes    Mixed hyperlipidemia [E78.2]  Yes    Benign essential hypertension [I10]  Yes      Resolved Hospital Problems   No  resolved problems to display.       78 y.o. female admitted with Severe sepsis.    78 y.o. female admitted with Severe sepsis.     Cholecystitis: Blood cultures are negative. Continue zosyn.  Surgery on board.  Management with cholecystostomy, eventual cholecystectomy. Surgery and GI following.  EPEC diarrhea: Continue Zosyn.  Sepsis: Lactic acidosis improved. Fever last night. Monitoring.  Pancreatic Mass/Obstructive Jaundice: Stent placement last admission. This is presumed pancreatic cancer with high ca19-9 and brushing suspicious for malignancy but not diagnostic. EUS will be needed. Oncology, gastroenterology on board.  GIB: Single bloody bowel movement. PPI. Serial HH. Transfuse if needed.  HTN: Acceptable acutely.  HLD: Statin allergy  Acute confusion, check ammonia levels, consult psychiatry.  PPX: SCD  Disposition: TBD      Feliciano Staley MD  [facility]  09/15/23  16:35 EDT

## 2023-09-15 NOTE — TELEPHONE ENCOUNTER
----- Message from Catarino Mg MD sent at 9/11/2023  3:37 PM EDT -----  Biopsies of the duct suggest suspicion for cancer, patient needs follow-up with oncology already scheduled.

## 2023-09-15 NOTE — PLAN OF CARE
Goal Outcome Evaluation:                      Plans for HD tomorrow.   Tolerating meds this shift.

## 2023-09-15 NOTE — CASE MANAGEMENT/SOCIAL WORK
Continued Stay Note  [facility]     Patient Name: Lizzie Bose  MRN: [id number]  Today's Date: 9/15/2023    Admit Date: 9/9/2023    Plan: SNF -referrals pending   Discharge Plan       Row Name 09/15/23 1420       Plan    Plan SNF -referrals pending    Plan Comments Per Latisha/Andrew, no beds.  Per Ariadne/Amy, there are beds available but patient needs to have bx. prior to coming to SNF.  She is checking with management but does not think patient can leave for bx. and then return to skilled bed.  Packet in CCP office.  CCP following.  Karsten JIMENEZ                 Expected Discharge Date and Time       Expected Discharge Date Expected Discharge Time    Sep 16, 2023               Becky S. [place], RN

## 2023-09-15 NOTE — PLAN OF CARE
Problem: Adult Inpatient Plan of Care  Goal: Plan of Care Review  Outcome: Ongoing, Progressing  Flowsheets (Taken 9/15/2023 0425)  Progress: no change  Plan of Care Reviewed With: patient  Outcome Evaluation: Patient had no complaints overnight. Up with assist to BSC. Continue to have tarry stools. Hemoglobin stable. VSS. Jaundice. Drain flushed per order.   Plans for rehab placement and OP biopsy. IV antibotics continue. All needs met.

## 2023-09-15 NOTE — TELEPHONE ENCOUNTER
----- Message from MORIS Saha sent at 9/15/2023 12:35 PM EDT -----  Regarding: Needs EUS  This patient is currently admitted but needs an EUS with Dr. Morfin upon discharge.  Please coordinate referral.  Thanks Barb

## 2023-09-15 NOTE — THERAPY TREATMENT NOTE
Patient Name: Lizzie Bose  : 1945    MRN: [id number]                              Today's Date: 9/15/2023       Admit Date: 2023    Visit Dx:     ICD-10-CM ICD-9-CM   1. Severe sepsis  A41.9 038.9    R65.20 995.92   2. Jaundice  R17 782.4   3. Generalized weakness  R53.1 780.79   4. Diarrhea, unspecified type  R19.7 787.91   5. Pancreatic mass  K86.89 577.8     Patient Active Problem List   Diagnosis    PAT (obstructive sleep apnea)    Lumbar pain    Primary osteoarthritis of left knee    Primary osteoarthritis of right knee    Benign essential hypertension    Mixed hyperlipidemia    Vitamin D deficiency    Age-related osteoporosis without current pathological fracture    Hyperinsulinism    Chronic insomnia    Attention deficit hyperactivity disorder (ADHD), combined type    Dyspnea on exertion    Dyslipidemia    Biliary obstruction    Elevated liver enzymes    Pancreatic mass    Severe sepsis     Past Medical History:   Diagnosis Date    ADD (attention deficit disorder)     Arthritis     Depression     High blood pressure     History of Clostridioides difficile infection     Hyperlipidemia     Low back pain     Right knee pain     Sleep apnea     NO USING MACHINE     Past Surgical History:   Procedure Laterality Date    APPENDECTOMY      CARPAL TUNNEL RELEASE Right     CATARACT EXTRACTION Bilateral     ERCP WITH STENT PLACEMENT N/A 2023    Procedure: ENDOSCOPIC RETROGRADE CHOLANGIOPANCREATOGRAPHY WITH SPHINCTEROTOMY; BRUSHING AND STENT PLACEMENT;  Surgeon: Catarino Mg MD;  Location: [facility] ENDOSCOPY;  Service: Gastroenterology;  Laterality: N/A;  pre-biliary obstruction  post-    HAMMER TOE REPAIR Left     HIP ARTHROPLASTY Left     TOTAL KNEE ARTHROPLASTY Right 2020    Procedure: TOTAL KNEE ARTHROPLASTY;  Surgeon: Krish Ramirez MD;  Location: [facility] OR;  Service: Orthopedics;  Laterality: Right;      General Information       Row Name 09/15/23 1551          Physical Therapy  Time and Intention    Document Type therapy note (daily note)  -PH     Mode of Treatment physical therapy  -PH       Row Name 09/15/23 1551          Safety Issues, Functional Mobility    Safety Issues Affecting Function (Mobility) insight into deficits/self-awareness  -PH     Impairments Affecting Function (Mobility) endurance/activity tolerance  -PH     Comment, Safety Issues/Impairments (Mobility) pt refused use of gait belt; non skid socks donned  -PH               User Key  (r) = Recorded By, (t) = Taken By, (c) = Cosigned By      Initials Name Provider Type     Angela Beckman PTA Physical Therapist Assistant                   Mobility       Row Name 09/15/23 1552          Bed Mobility    Bed Mobility supine-sit;sit-supine  -PH     Supine-Sit [place] (Bed Mobility) modified independence  -PH     Sit-Supine [place] (Bed Mobility) modified independence  -PH       Row Name 09/15/23 1552          Sit-Stand Transfer    Sit-Stand [place] (Transfers) modified independence  -PH     Comment, (Sit-Stand Transfer) 2x STS; 1x from EOB and 1x from BSC  -PH       Row Name 09/15/23 1552          Gait/Stairs (Locomotion)    [place] Level (Gait) standby assist  -PH     Assistive Device (Gait) other (see comments)  IV pole for support  -PH     Distance in Feet (Gait) 90'  -PH     Deviations/Abnormal Patterns (Gait) gait speed decreased  -PH     [place] Level (Stairs) not tested  -PH     Comment, (Gait/Stairs) fairly steady w/ no overt LOB  -PH               User Key  (r) = Recorded By, (t) = Taken By, (c) = Cosigned By      Initials Name Provider Type     Angela Beckman PTA Physical Therapist Assistant                   Obj/Interventions    No documentation.                  Goals/Plan    No documentation.                  Clinical Impression       Row Name 09/15/23 1553          Pain    Posttreatment Pain Rating 4/10  -PH     Pre/Posttreatment Pain Comment RLQ at drain insertion  region  -PH     Pain Intervention(s) Repositioned;Rest  -PH     Additional Documentation Pain Scale: Numbers Pre/Post-Treatment (Group)  -PH       Row Name 09/15/23 1553          Plan of Care Review    Plan of Care Reviewed With patient  -PH     Progress improving  -PH     Outcome Evaluation Pt seen by PT this date for tx. Pt performed all bed mobility w/ mod I. Pt stood w/ SBA from EOB and amb 90' in room w/ use of IV pole. Pt performed toileting hygiene w/ mod I and was able to stand approx 8 min doing so. Pt returned to bed w/ mod I. PT will prog as pt rikki.  -PH       Row Name 09/15/23 1553          Positioning and Restraints    Pre-Treatment Position in bed  -PH     Post Treatment Position bed  -PH     In Bed notified nsg;call light within reach;encouraged to call for assist;exit alarm on;fowlers  -PH               User Key  (r) = Recorded By, (t) = Taken By, (c) = Cosigned By      Initials Name Provider Type    PH Angela Beckman PTA Physical Therapist Assistant                   Outcome Measures       Row Name 09/15/23 1555 09/15/23 0852       How much help from another person do you currently need...    Turning from your back to your side while in flat bed without using bedrails? 4  -PH 4  -KS    Moving from lying on back to sitting on the side of a flat bed without bedrails? 4  -PH 4  -KS    Moving to and from a bed to a chair (including a wheelchair)? 4  -PH 3  -KS    Standing up from a chair using your arms (e.g., wheelchair, bedside chair)? 4  -PH 3  -KS    Climbing 3-5 steps with a railing? 3  -PH 3  -KS    To walk in hospital room? 3  -PH 3  -KS    AM-PAC 6 Clicks Score (PT) 22  -PH 20  -KS    Highest level of mobility 7 --> Walked 25 feet or more  -PH 6 --> Walked 10 steps or more  -KS      Row Name 09/15/23 1555          Functional Assessment    Outcome Measure Options AM-PAC 6 Clicks Basic Mobility (PT)  -PH               User Key  (r) = Recorded By, (t) = Taken By, (c) = Cosigned By       Initials Name Provider Type    Nirali Zelaya, RN Registered Nurse     Angela Beckman PTA Physical Therapist Assistant                                 Physical Therapy Education       Title: PT OT SLP Therapies (In Progress)       Topic: Physical Therapy (In Progress)       Point: Mobility training (Done)       Learning Progress Summary             Patient Acceptance, E,TB, VU by  at 9/15/2023 1555    Acceptance, E, VU,NR by  at 9/14/2023 1512                         Point: Home exercise program (Not Started)       Learner Progress:  Not documented in this visit.              Point: Body mechanics (Done)       Learning Progress Summary             Patient Acceptance, E,TB, VU by  at 9/15/2023 1555                         Point: Precautions (Done)       Learning Progress Summary             Patient Acceptance, E,TB, VU by  at 9/15/2023 1555                                         User Key       Initials Effective Dates Name Provider Type Discipline     06/16/21 -  Stephanie Orta, PT Physical Therapist PT     06/16/21 -  Angela Beckman PTA Physical Therapist Assistant PT                  PT Recommendation and Plan     Plan of Care Reviewed With: patient  Progress: improving  Outcome Evaluation: Pt seen by PT this date for tx. Pt performed all bed mobility w/ mod I. Pt stood w/ SBA from EOB and amb 90' in room w/ use of IV pole. Pt performed toileting hygiene w/ mod I and was able to stand approx 8 min doing so. Pt returned to bed w/ mod I. PT will prog as pt rikki.     Time Calculation:         PT Charges       Row Name 09/15/23 1555 09/15/23 0752          Time Calculation    Start Time 1533  -PH --     Stop Time 1551  -PH --     Time Calculation (min) 18 min  -PH --     PT Received On 09/15/23  - --     PT - Next Appointment 09/18/23  - 09/16/23  -RD        Timed Charges    34833 - PT Therapeutic Activity Minutes 18  -PH --        Total Minutes    Timed Charges Total  Minutes 18  -PH --      Total Minutes 18  -PH --               User Key  (r) = Recorded By, (t) = Taken By, (c) = Cosigned By      Initials Name Provider Type    Kristin Odom, PT Physical Therapist    PH Angela Beckman PTA Physical Therapist Assistant                  Therapy Charges for Today       Code Description Service Date Service Provider Modifiers Qty    [id number]  PT THERAPEUTIC ACT EA 15 MIN 9/15/2023 Angela Beckman PTA GP 1            PT G-Codes  Outcome Measure Options: AM-PAC 6 Clicks Basic Mobility (PT)  AM-PAC 6 Clicks Score (PT): 22  PT Discharge Summary  Anticipated Discharge Disposition (PT): home with assist    Angela Beckman PTA  9/15/2023

## 2023-09-15 NOTE — PLAN OF CARE
Goal Outcome Evaluation:  Plan of Care Reviewed With: patient        Progress: improving  Outcome Evaluation: Pt seen by PT this date for tx. Pt performed all bed mobility w/ mod I. Pt stood w/ SBA from EOB and amb 90' in room w/ use of IV pole. Pt performed toileting hygiene w/ mod I and was able to stand approx 8 min doing so. Pt returned to bed w/ mod I. PT will prog as pt rikki.      Anticipated Discharge Disposition (PT): home with assist

## 2023-09-15 NOTE — PROGRESS NOTES
[facility]  Inpatient Progress Note    Reason for Follow Up:  GI bleed     Subjective     Interval History:     No dark tarry stool overnight.  Cholecystostomy tube in place.  She reports minimal discomfort from the tube.  No nausea or vomiting.  Tolerating regular diet.  She continues on twice daily dosing IV PPI therapy.    Hemoglobin up to 9.1 this morning.  LFTs elevated but improving.      Current Facility-Administered Medications:     aluminum-magnesium hydroxide-simethicone (MAALOX MAX) 400-400-40 MG/5ML suspension 15 mL, 15 mL, Oral, Q6H PRN, Sussy Crawley APRN    Calcium Replacement - Follow Nurse / BPA Driven Protocol, , Does not apply, PRN, Ankur Lehman MD    cetirizine (zyrTEC) tablet 10 mg, 10 mg, Oral, Daily, Ankur Lehman MD, 10 mg at 09/14/23 0941    desvenlafaxine (PRISTIQ) 24 hr tablet 50 mg, 50 mg, Oral, Daily, Ankur Lehman MD, 50 mg at 09/14/23 0941    HYDROmorphone (DILAUDID) injection 0.5 mg, 0.5 mg, Intravenous, Q2H PRN, Ankur Lehman MD, 0.5 mg at 09/13/23 2331    hydrOXYzine (ATARAX) tablet 25 mg, 25 mg, Oral, TID PRN, Ankur Lehman MD    lactulose (CHRONULAC) 10 GM/15ML solution 10 g, 10 g, Oral, BID PRN, Ankur Lehman MD    Magnesium Standard Dose Replacement - Follow Nurse / BPA Driven Protocol, , Does not apply, PRN, Ankur Lehman MD    melatonin tablet 3 mg, 3 mg, Oral, Nightly PRN, Sussy Crawley APRN    nitroglycerin (NITROSTAT) SL tablet 0.4 mg, 0.4 mg, Sublingual, Q5 Min PRN, Sussy Crawley APRN    ondansetron (ZOFRAN) tablet 4 mg, 4 mg, Oral, Q6H PRN **OR** ondansetron (ZOFRAN) injection 4 mg, 4 mg, Intravenous, Q6H PRN, Sussy Crawley APRN    pantoprazole (PROTONIX) injection 40 mg, 40 mg, Intravenous, Q12H, Ankur Lehman MD, 40 mg at 09/14/23 2200    Phosphorus Replacement - Follow Nurse / BPA Driven Protocol, , Does not apply, PRN, Ankur Lehman MD    piperacillin-tazobactam  (ZOSYN) 3.375 g in iso-osmotic dextrose 50 ml (premix), 3.375 g, Intravenous, Q8H, Feliciano Staley MD, 3.375 g at 09/15/23 0616    potassium chloride (K-DUR,KLOR-CON) ER tablet 40 mEq, 40 mEq, Oral, Q4H, Feliciano Staley MD    Potassium Replacement - Follow Nurse / BPA Driven Protocol, , Does not apply, PRN, Ankur Lehman MD    sodium chloride 0.9 % flush 10 mL, 10 mL, Intravenous, PRN, Sussy Crawley, APRN, 10 mL at 09/11/23 2112    traMADol (ULTRAM) tablet 50 mg, 50 mg, Oral, Q4H PRN, Ankur Lehman MD    Facility-Administered Medications Ordered in Other Encounters:     Chlorhexidine Gluconate 2 % pads 2 each, 2 pad , Apply externally, BID, Krish Ramirez MD  Review of Systems:    All systems were reviewed and negative except for:  Gastrointestinal: positive for  minimal discomfort from cholecystostomy tube    Objective     Vital Signs  Temp:  [98.3 °F (36.8 °C)-98.6 °F (37 °C)] 98.3 °F (36.8 °C)  Heart Rate:  [81-90] 87  Resp:  [18] 18  BP: (137-158)/(79-91) 137/79  Body mass index is 20.88 kg/m².    Intake/Output Summary (Last 24 hours) at 9/15/2023 0822  Last data filed at 9/15/2023 0616  Gross per 24 hour   Intake 170 ml   Output 50 ml   Net 120 ml     No intake/output data recorded.     Physical Exam:   General: patient awake, alert and cooperative   Abdomen: soft, nontender, nondistended; normal bowel sounds     Results Review:     I reviewed the patient's new clinical results.    Results from last 7 days   Lab Units 09/15/23  0635 09/14/23  0751 09/13/23  2242   WBC 10*3/mm3 10.45 11.10* 12.88*   HEMOGLOBIN g/dL 9.1* 8.8* 9.6*  9.6*   HEMATOCRIT % 26.9* 25.8* 27.9*  27.9*   PLATELETS 10*3/mm3 354 344 399     Results from last 7 days   Lab Units 09/15/23  0635 09/14/23  1858 09/14/23  0751 09/13/23  0448   SODIUM mmol/L 135*  --  139 141   POTASSIUM mmol/L 3.5 3.9 3.3* 4.6  4.6   CHLORIDE mmol/L 104  --  106 107   CO2 mmol/L 23.0  --  24.4 26.4   BUN mg/dL 14  --  17 12   CREATININE  mg/dL 0.56*  --  0.54* 0.55*   CALCIUM mg/dL 8.1*  --  7.9* 8.2*   BILIRUBIN mg/dL 4.3*  --  5.8* 8.4*   ALK PHOS U/L 482*  --  628* 770*   ALT (SGPT) U/L 88*  --  128* 170*   AST (SGOT) U/L 66*  --  146* 265*   GLUCOSE mg/dL 96  --  97 121*     Results from last 7 days   Lab Units 09/11/23  0640 09/09/23 2156   INR  1.16* 1.13*     Lab Results   Lab Value Date/Time    LIPASE 148 (H) 09/09/2023 2156    LIPASE 108 (H) 08/28/2023 0458       Radiology:  CT Guided Abscess Drain Peritoneal   Final Result   Successful cholecystostomy tube placement as described above. Daily   flushing (including at home) necessary. Tract maturation requires at   least 6 weeks. Cystic duct patency to be assessed prior to potential   removal.           This report was finalized on 9/12/2023 6:53 PM by Dr. Judson Stephens M.D.          XR Abdomen 2+ VW with Chest 1 VW   Final Result   No acute abnormality.       This report was finalized on 9/12/2023 3:55 PM by Dr. Joel Brewster M.D.          CT Abdomen Pelvis With Contrast   Final Result          Assessment & Plan     Active Hospital Problems    Diagnosis     **Severe sepsis     Pancreatic mass     Mixed hyperlipidemia     Benign essential hypertension      All problems are new to me today    Assessment:    Elevated bilirubin, sphincterotomy with Wallstent placement 8/29  Elevated transaminases improving  Pancreas neoplasm suspected-brushings were nondiagnostic, EUS panned as an outpatient  Maroon stool the evening of 9/11 and 9/14 with a slight drop in hemoglobin, patient received packed red blood cells on 9/12 morning    Plan:  Continue twice daily dosing PPI therapy  No dark stool overnight, hemoglobin stable we will continue to monitor and transfuse as needed  May need EGD if bleeding persists or hemoglobin declines.  Unlikely to be related to recent sphincterotomy as she is too far out from procedure  Plans for outpatient EUS with Dr. Morfin  Surgery managing cholecystostomy  tube  We will sign off but remain available if needed  Our office will arrange outpatient EUS with Dr. Morfin    I discussed the patients findings and my recommendations with patient and nursing staff.    MORIS Saha

## 2023-09-16 LAB
ALBUMIN SERPL-MCNC: 2.4 G/DL (ref 3.5–5.2)
ALBUMIN/GLOB SERPL: 0.8 G/DL
ALP SERPL-CCNC: 434 U/L (ref 39–117)
ALT SERPL W P-5'-P-CCNC: 69 U/L (ref 1–33)
ANION GAP SERPL CALCULATED.3IONS-SCNC: 9 MMOL/L (ref 5–15)
AST SERPL-CCNC: 40 U/L (ref 1–32)
BASOPHILS # BLD AUTO: 0.05 10*3/MM3 (ref 0–0.2)
BASOPHILS NFR BLD AUTO: 0.5 % (ref 0–1.5)
BILIRUB SERPL-MCNC: 3.6 MG/DL (ref 0–1.2)
BUN SERPL-MCNC: 11 MG/DL (ref 8–23)
BUN/CREAT SERPL: 18.6 (ref 7–25)
CALCIUM SPEC-SCNC: 8.2 MG/DL (ref 8.6–10.5)
CHLORIDE SERPL-SCNC: 105 MMOL/L (ref 98–107)
CO2 SERPL-SCNC: 23 MMOL/L (ref 22–29)
CREAT SERPL-MCNC: 0.59 MG/DL (ref 0.57–1)
DEPRECATED RDW RBC AUTO: 51.2 FL (ref 37–54)
EGFRCR SERPLBLD CKD-EPI 2021: 91.2 ML/MIN/1.73
EOSINOPHIL # BLD AUTO: 0.2 10*3/MM3 (ref 0–0.4)
EOSINOPHIL NFR BLD AUTO: 1.9 % (ref 0.3–6.2)
ERYTHROCYTE [DISTWIDTH] IN BLOOD BY AUTOMATED COUNT: 14.9 % (ref 12.3–15.4)
GLOBULIN UR ELPH-MCNC: 3.2 GM/DL
GLUCOSE SERPL-MCNC: 124 MG/DL (ref 65–99)
HCT VFR BLD AUTO: 28.3 % (ref 34–46.6)
HGB BLD-MCNC: 9.5 G/DL (ref 12–15.9)
IMM GRANULOCYTES # BLD AUTO: 0.16 10*3/MM3 (ref 0–0.05)
IMM GRANULOCYTES NFR BLD AUTO: 1.6 % (ref 0–0.5)
LYMPHOCYTES # BLD AUTO: 1.85 10*3/MM3 (ref 0.7–3.1)
LYMPHOCYTES NFR BLD AUTO: 18 % (ref 19.6–45.3)
MCH RBC QN AUTO: 31.5 PG (ref 26.6–33)
MCHC RBC AUTO-ENTMCNC: 33.6 G/DL (ref 31.5–35.7)
MCV RBC AUTO: 93.7 FL (ref 79–97)
MONOCYTES # BLD AUTO: 0.53 10*3/MM3 (ref 0.1–0.9)
MONOCYTES NFR BLD AUTO: 5.1 % (ref 5–12)
NEUTROPHILS NFR BLD AUTO: 7.51 10*3/MM3 (ref 1.7–7)
NEUTROPHILS NFR BLD AUTO: 72.9 % (ref 42.7–76)
NRBC BLD AUTO-RTO: 0.1 /100 WBC (ref 0–0.2)
PLATELET # BLD AUTO: 350 10*3/MM3 (ref 140–450)
PMV BLD AUTO: 9.3 FL (ref 6–12)
POTASSIUM SERPL-SCNC: 4 MMOL/L (ref 3.5–5.2)
PROT SERPL-MCNC: 5.6 G/DL (ref 6–8.5)
RBC # BLD AUTO: 3.02 10*6/MM3 (ref 3.77–5.28)
SODIUM SERPL-SCNC: 137 MMOL/L (ref 136–145)
WBC NRBC COR # BLD: 10.3 10*3/MM3 (ref 3.4–10.8)

## 2023-09-16 PROCEDURE — 85025 COMPLETE CBC W/AUTO DIFF WBC: CPT | Performed by: NURSE PRACTITIONER

## 2023-09-16 PROCEDURE — 80053 COMPREHEN METABOLIC PANEL: CPT | Performed by: NURSE PRACTITIONER

## 2023-09-16 PROCEDURE — 25010000002 PIPERACILLIN SOD-TAZOBACTAM PER 1 G: Performed by: INTERNAL MEDICINE

## 2023-09-16 RX ADMIN — PIPERACILLIN SODIUM AND TAZOBACTAM SODIUM 3.38 G: 3; .375 INJECTION, SOLUTION INTRAVENOUS at 14:11

## 2023-09-16 RX ADMIN — PIPERACILLIN SODIUM AND TAZOBACTAM SODIUM 3.38 G: 3; .375 INJECTION, SOLUTION INTRAVENOUS at 06:04

## 2023-09-16 RX ADMIN — HYDROXYZINE HYDROCHLORIDE 25 MG: 25 TABLET ORAL at 23:40

## 2023-09-16 RX ADMIN — PIPERACILLIN SODIUM AND TAZOBACTAM SODIUM 3.38 G: 3; .375 INJECTION, SOLUTION INTRAVENOUS at 23:09

## 2023-09-16 RX ADMIN — CETIRIZINE HYDROCHLORIDE 10 MG: 10 TABLET ORAL at 09:24

## 2023-09-16 RX ADMIN — PANTOPRAZOLE SODIUM 40 MG: 40 INJECTION, POWDER, FOR SOLUTION INTRAVENOUS at 09:24

## 2023-09-16 RX ADMIN — PANTOPRAZOLE SODIUM 40 MG: 40 INJECTION, POWDER, FOR SOLUTION INTRAVENOUS at 20:00

## 2023-09-16 RX ADMIN — DESVENLAFAXINE SUCCINATE 50 MG: 50 TABLET, EXTENDED RELEASE ORAL at 09:24

## 2023-09-16 NOTE — PLAN OF CARE
Problem: Adult Inpatient Plan of Care  Goal: Plan of Care Review  Outcome: Ongoing, Progressing  Flowsheets (Taken 9/16/2023 0422)  Progress: improving  Plan of Care Reviewed With: patient  Outcome Evaluation: Patient had no complaints overnight. Ambulating independently to BSC. Multiple small BMs. IV antibotics continue. A&Ox4. VSS. Plan to transfer to [place] or [facility] for pancreatic biopsy. All needs met.

## 2023-09-16 NOTE — PROGRESS NOTES
Name: Lizzie Bose ADMIT: 2023   : 1945  PCP: Ct Alford APRN    MRN: [id number] LOS: 7 days   AGE/SEX: 78 y.o. female  ROOM: [facility]     Subjective   Subjective   Patient is seen at bedside, no new complaints.       Objective   Objective   Vital Signs  Temp:  [97.1 °F (36.2 °C)-98.4 °F (36.9 °C)] 97.1 °F (36.2 °C)  Heart Rate:  [70-80] 80  Resp:  [16-18] 18  BP: (124-155)/(63-84) 155/80  SpO2:  [95 %-97 %] 97 %  on   ;   Device (Oxygen Therapy): room air  Body mass index is 20.88 kg/m².  Physical Exam  Vitals and nursing note reviewed.   Constitutional:       General: She is not in acute distress.     Appearance: She is ill-appearing. She is not diaphoretic.   HENT:      Head: Atraumatic.   Eyes:      General: Scleral icterus present.   Cardiovascular:      Rate and Rhythm: Normal rate and regular rhythm.      Pulses: Normal pulses.   Pulmonary:      Effort: Pulmonary effort is normal.      Breath sounds: No wheezing.   Abdominal:      General: There is no distension.      Palpations: Abdomen is soft.      Tenderness: There is no abdominal tenderness. There is no guarding or rebound.   Musculoskeletal:         General: No swelling or tenderness.   Skin:     General: Skin is warm and dry.      Coloration: Skin is jaundiced.   Neurological:      Mental Status: She is alert. Mental status is at baseline.   Psychiatric:      Appears to be very confused     Copied text material from yesterday's note has been reviewed for appropriate changes and remains accurate as of 23.      Results Review     I reviewed the patient's new clinical results.  Results from last 7 days   Lab Units 23  0700 09/15/23  0635 23  0751 23  2242   WBC 10*3/mm3 10.30 10.45 11.10* 12.88*   HEMOGLOBIN g/dL 9.5* 9.1* 8.8* 9.6*  9.6*   PLATELETS 10*3/mm3 350 354 344 399     Results from last 7 days   Lab Units 23  0700 09/15/23  1707 09/15/23  0635 23  1858 23  0751 23  0448   SODIUM  mmol/L 137  --  135*  --  139 141   POTASSIUM mmol/L 4.0 4.3 3.5 3.9 3.3* 4.6  4.6   CHLORIDE mmol/L 105  --  104  --  106 107   CO2 mmol/L 23.0  --  23.0  --  24.4 26.4   BUN mg/dL 11  --  14  --  17 12   CREATININE mg/dL 0.59  --  0.56*  --  0.54* 0.55*   GLUCOSE mg/dL 124*  --  96  --  97 121*   EGFR mL/min/1.73 91.2  --  92.4  --  93.2 92.8     Results from last 7 days   Lab Units 09/16/23  0700 09/15/23  0635 09/14/23  0751 09/13/23 0448   ALBUMIN g/dL 2.4* 2.1* 2.0* 2.2*   BILIRUBIN mg/dL 3.6* 4.3* 5.8* 8.4*   ALK PHOS U/L 434* 482* 628* 770*   AST (SGOT) U/L 40* 66* 146* 265*   ALT (SGPT) U/L 69* 88* 128* 170*     Results from last 7 days   Lab Units 09/16/23  0700 09/15/23  0635 09/14/23  0751 09/13/23 0448 09/12/23  1932   CALCIUM mg/dL 8.2* 8.1* 7.9* 8.2* 8.2*   ALBUMIN g/dL 2.4* 2.1* 2.0* 2.2*  --    MAGNESIUM mg/dL  --   --   --   --  1.8   PHOSPHORUS mg/dL  --   --   --  2.9 2.2*     Results from last 7 days   Lab Units 09/10/23  0701 09/10/23  0359 09/10/23  0054 09/09/23  2156   LACTATE mmol/L 1.8 2.1* 2.7* 3.5*     No results found for: HGBA1C, POCGLU    No radiology results for the last day    I have personally reviewed all medications:  Scheduled Medications  cetirizine, 10 mg, Oral, Daily  desvenlafaxine, 50 mg, Oral, Daily  pantoprazole, 40 mg, Intravenous, Q12H  piperacillin-tazobactam, 3.375 g, Intravenous, Q8H    Infusions   Diet  Diet: Regular/House Diet; Texture: Regular Texture (IDDSI 7); Fluid Consistency: Thin (IDDSI 0)    I have personally reviewed:  [x]  Laboratory   [x]  Microbiology   [x]  Radiology   [x]  EKG/Telemetry  [x]  Cardiology/Vascular   []  Pathology    []  Records       Assessment/Plan     Active Hospital Problems    Diagnosis  POA    **Severe sepsis [A41.9, R65.20]  Yes    Pancreatic mass [K86.89]  Yes    Mixed hyperlipidemia [E78.2]  Yes    Benign essential hypertension [I10]  Yes      Resolved Hospital Problems   No resolved problems to display.       78 y.o. female  admitted with Severe sepsis.    78 y.o. female admitted with Severe sepsis.     Cholecystitis: Blood cultures are negative. Continue zosyn.  Surgery on board.  Management with cholecystostomy, eventual cholecystectomy. Surgery and GI following.  EPEC diarrhea: Continue Zosyn.  Sepsis: Lactic acidosis improved. Fever last night. Monitoring.  Pancreatic Mass/Obstructive Jaundice: Stent placement last admission. This is presumed pancreatic cancer with high ca19-9 and brushing suspicious for malignancy but not diagnostic. EUS will be needed. Oncology, gastroenterology on board.  GIB: Single bloody bowel movement. PPI. Serial HH. Transfuse if needed.  HTN: Acceptable acutely.  HLD: Statin allergy  Acute confusion, check ammonia levels, consult psychiatry.  PPX: SCD  Disposition: TBD            Feliciano Staley MD  [facility]  09/16/23  17:33 EDT

## 2023-09-16 NOTE — PLAN OF CARE
Goal Outcome Evaluation:  Plan of Care Reviewed With: patient        Progress: improving  Outcome Evaluation: vss. telemetry monitor, sr/st. alert and oriented x3, forgetful at times. up with assist. iv antibiotics.

## 2023-09-17 LAB
ALBUMIN SERPL-MCNC: 2.6 G/DL (ref 3.5–5.2)
ALBUMIN/GLOB SERPL: 0.8 G/DL
ALP SERPL-CCNC: 361 U/L (ref 39–117)
ALT SERPL W P-5'-P-CCNC: 53 U/L (ref 1–33)
ANION GAP SERPL CALCULATED.3IONS-SCNC: 8 MMOL/L (ref 5–15)
AST SERPL-CCNC: 35 U/L (ref 1–32)
BACTERIA SPEC ANAEROBE CULT: NORMAL
BASOPHILS # BLD AUTO: 0.06 10*3/MM3 (ref 0–0.2)
BASOPHILS NFR BLD AUTO: 0.7 % (ref 0–1.5)
BILIRUB SERPL-MCNC: 3.4 MG/DL (ref 0–1.2)
BUN SERPL-MCNC: 10 MG/DL (ref 8–23)
BUN/CREAT SERPL: 15.9 (ref 7–25)
CALCIUM SPEC-SCNC: 8.4 MG/DL (ref 8.6–10.5)
CHLORIDE SERPL-SCNC: 103 MMOL/L (ref 98–107)
CO2 SERPL-SCNC: 23 MMOL/L (ref 22–29)
CREAT SERPL-MCNC: 0.63 MG/DL (ref 0.57–1)
DEPRECATED RDW RBC AUTO: 50.7 FL (ref 37–54)
EGFRCR SERPLBLD CKD-EPI 2021: 89.8 ML/MIN/1.73
EOSINOPHIL # BLD AUTO: 0.23 10*3/MM3 (ref 0–0.4)
EOSINOPHIL NFR BLD AUTO: 2.6 % (ref 0.3–6.2)
ERYTHROCYTE [DISTWIDTH] IN BLOOD BY AUTOMATED COUNT: 15.1 % (ref 12.3–15.4)
GLOBULIN UR ELPH-MCNC: 3.4 GM/DL
GLUCOSE SERPL-MCNC: 107 MG/DL (ref 65–99)
HCT VFR BLD AUTO: 30.4 % (ref 34–46.6)
HGB BLD-MCNC: 10.2 G/DL (ref 12–15.9)
IMM GRANULOCYTES # BLD AUTO: 0.09 10*3/MM3 (ref 0–0.05)
IMM GRANULOCYTES NFR BLD AUTO: 1 % (ref 0–0.5)
LYMPHOCYTES # BLD AUTO: 1.54 10*3/MM3 (ref 0.7–3.1)
LYMPHOCYTES NFR BLD AUTO: 17.4 % (ref 19.6–45.3)
MCH RBC QN AUTO: 31.4 PG (ref 26.6–33)
MCHC RBC AUTO-ENTMCNC: 33.6 G/DL (ref 31.5–35.7)
MCV RBC AUTO: 93.5 FL (ref 79–97)
MONOCYTES # BLD AUTO: 0.5 10*3/MM3 (ref 0.1–0.9)
MONOCYTES NFR BLD AUTO: 5.6 % (ref 5–12)
NEUTROPHILS NFR BLD AUTO: 6.45 10*3/MM3 (ref 1.7–7)
NEUTROPHILS NFR BLD AUTO: 72.7 % (ref 42.7–76)
NRBC BLD AUTO-RTO: 0.1 /100 WBC (ref 0–0.2)
PLATELET # BLD AUTO: 374 10*3/MM3 (ref 140–450)
PMV BLD AUTO: 8.9 FL (ref 6–12)
POTASSIUM SERPL-SCNC: 3.8 MMOL/L (ref 3.5–5.2)
PROT SERPL-MCNC: 6 G/DL (ref 6–8.5)
RBC # BLD AUTO: 3.25 10*6/MM3 (ref 3.77–5.28)
SODIUM SERPL-SCNC: 134 MMOL/L (ref 136–145)
WBC NRBC COR # BLD: 8.87 10*3/MM3 (ref 3.4–10.8)

## 2023-09-17 PROCEDURE — 80053 COMPREHEN METABOLIC PANEL: CPT | Performed by: INTERNAL MEDICINE

## 2023-09-17 PROCEDURE — 25010000002 PIPERACILLIN SOD-TAZOBACTAM PER 1 G: Performed by: INTERNAL MEDICINE

## 2023-09-17 PROCEDURE — 85025 COMPLETE CBC W/AUTO DIFF WBC: CPT | Performed by: INTERNAL MEDICINE

## 2023-09-17 RX ADMIN — PIPERACILLIN SODIUM AND TAZOBACTAM SODIUM 3.38 G: 3; .375 INJECTION, SOLUTION INTRAVENOUS at 05:17

## 2023-09-17 RX ADMIN — DESVENLAFAXINE SUCCINATE 50 MG: 50 TABLET, EXTENDED RELEASE ORAL at 09:17

## 2023-09-17 RX ADMIN — CETIRIZINE HYDROCHLORIDE 10 MG: 10 TABLET ORAL at 09:17

## 2023-09-17 RX ADMIN — PANTOPRAZOLE SODIUM 40 MG: 40 INJECTION, POWDER, FOR SOLUTION INTRAVENOUS at 21:46

## 2023-09-17 RX ADMIN — PANTOPRAZOLE SODIUM 40 MG: 40 INJECTION, POWDER, FOR SOLUTION INTRAVENOUS at 09:17

## 2023-09-17 NOTE — PROGRESS NOTES
Name: Lizzie Bose ADMIT: 2023   : 1945  PCP: Ct Alford APRN    MRN: [id number] LOS: 8 days   AGE/SEX: 78 y.o. female  ROOM: [facility]     Subjective   Subjective   Patient is seen at bedside, no new complaints.       Objective   Objective   Vital Signs  Temp:  [98.1 °F (36.7 °C)-98.8 °F (37.1 °C)] 98.2 °F (36.8 °C)  Heart Rate:  [] 105  Resp:  [18] 18  BP: (128-149)/(65-78) 139/78  SpO2:  [93 %-97 %] 96 %  on   ;   Device (Oxygen Therapy): room air  Body mass index is 20.88 kg/m².  Physical Exam  Vitals and nursing note reviewed.   Constitutional:       General: She is not in acute distress.     Appearance: She is ill-appearing. She is not diaphoretic.   HENT:      Head: Atraumatic.   Eyes:      General: Scleral icterus present.   Cardiovascular:      Rate and Rhythm: Normal rate and regular rhythm.      Pulses: Normal pulses.   Pulmonary:      Effort: Pulmonary effort is normal.      Breath sounds: No wheezing.   Abdominal:      General: There is no distension.      Palpations: Abdomen is soft.      Tenderness: There is no abdominal tenderness. There is no guarding or rebound.   Musculoskeletal:         General: No swelling or tenderness.   Skin:     General: Skin is warm and dry.      Coloration: Skin is jaundiced.   Neurological:      Mental Status: She is alert. Mental status is at baseline.   Psychiatric:      Appears to be very confused     Copied text material from yesterday's note has been reviewed for appropriate changes and remains accurate as of 23.      Results Review     I reviewed the patient's new clinical results.  Results from last 7 days   Lab Units 23  0655 23  0700 09/15/23  0635 23  0751   WBC 10*3/mm3 8.87 10.30 10.45 11.10*   HEMOGLOBIN g/dL 10.2* 9.5* 9.1* 8.8*   PLATELETS 10*3/mm3 374 350 354 344     Results from last 7 days   Lab Units 23  0655 23  0700 09/15/23  1707 09/15/23  0635 23  1858 23  0751   SODIUM mmol/L  134* 137  --  135*  --  139   POTASSIUM mmol/L 3.8 4.0 4.3 3.5   < > 3.3*   CHLORIDE mmol/L 103 105  --  104  --  106   CO2 mmol/L 23.0 23.0  --  23.0  --  24.4   BUN mg/dL 10 11  --  14  --  17   CREATININE mg/dL 0.63 0.59  --  0.56*  --  0.54*   GLUCOSE mg/dL 107* 124*  --  96  --  97   EGFR mL/min/1.73 89.8 91.2  --  92.4  --  93.2    < > = values in this interval not displayed.     Results from last 7 days   Lab Units 09/17/23  0655 09/16/23  0700 09/15/23  0635 09/14/23  0751   ALBUMIN g/dL 2.6* 2.4* 2.1* 2.0*   BILIRUBIN mg/dL 3.4* 3.6* 4.3* 5.8*   ALK PHOS U/L 361* 434* 482* 628*   AST (SGOT) U/L 35* 40* 66* 146*   ALT (SGPT) U/L 53* 69* 88* 128*     Results from last 7 days   Lab Units 09/17/23  0655 09/16/23  0700 09/15/23  0635 09/14/23  0751 09/13/23  0448 09/12/23  1932   CALCIUM mg/dL 8.4* 8.2* 8.1* 7.9* 8.2* 8.2*   ALBUMIN g/dL 2.6* 2.4* 2.1* 2.0* 2.2*  --    MAGNESIUM mg/dL  --   --   --   --   --  1.8   PHOSPHORUS mg/dL  --   --   --   --  2.9 2.2*       No results found for: HGBA1C, POCGLU    No radiology results for the last day    I have personally reviewed all medications:  Scheduled Medications  cetirizine, 10 mg, Oral, Daily  desvenlafaxine, 50 mg, Oral, Daily  pantoprazole, 40 mg, Intravenous, Q12H    Infusions   Diet  Diet: Regular/House Diet; Texture: Regular Texture (IDDSI 7); Fluid Consistency: Thin (IDDSI 0)    I have personally reviewed:  [x]  Laboratory   [x]  Microbiology   [x]  Radiology   [x]  EKG/Telemetry  [x]  Cardiology/Vascular   []  Pathology    []  Records       Assessment/Plan     Active Hospital Problems    Diagnosis  POA    **Severe sepsis [A41.9, R65.20]  Yes    Pancreatic mass [K86.89]  Yes    Mixed hyperlipidemia [E78.2]  Yes    Benign essential hypertension [I10]  Yes      Resolved Hospital Problems   No resolved problems to display.       78 y.o. female admitted with Severe sepsis.    78 y.o. female admitted with Severe sepsis.     Cholecystitis: Blood cultures are  negative.  Surgery on board.  Management with cholecystostomy, eventual cholecystectomy. Surgery and GI following.  EPEC diarrhea: Continue Zosyn.  Sepsis: Lactic acidosis improved. Fever last night. Monitoring.  Pancreatic Mass/Obstructive Jaundice: Stent placement last admission. This is presumed pancreatic cancer with high ca19-9 and brushing suspicious for malignancy but not diagnostic. EUS will be needed. Oncology, gastroenterology on board.  GIB: Single bloody bowel movement. PPI. Serial HH. Transfuse if needed.  HTN: Acceptable acutely.  HLD: Statin allergy  Acute confusion, check ammonia levels, consult psychiatry.  PPX: SCD  Disposition: TBD         Feliciano Staley MD  [facility]  09/17/23  15:26 EDT

## 2023-09-17 NOTE — PLAN OF CARE
Goal Outcome Evaluation:  Plan of Care Reviewed With: patient        Progress: improving  Outcome Evaluation: vss. telemetry monitor, sr. alert and oriented x4,forgetful at times. room air. up ad henrique. family at bedside.

## 2023-09-18 LAB
ALBUMIN SERPL-MCNC: 2.8 G/DL (ref 3.5–5.2)
ALBUMIN/GLOB SERPL: 0.8 G/DL
ALP SERPL-CCNC: 304 U/L (ref 39–117)
ALT SERPL W P-5'-P-CCNC: 49 U/L (ref 1–33)
ANION GAP SERPL CALCULATED.3IONS-SCNC: 12.3 MMOL/L (ref 5–15)
AST SERPL-CCNC: 35 U/L (ref 1–32)
BACTERIA FLD CULT: ABNORMAL
BASOPHILS # BLD AUTO: 0.04 10*3/MM3 (ref 0–0.2)
BASOPHILS NFR BLD AUTO: 0.6 % (ref 0–1.5)
BILIRUB SERPL-MCNC: 3.6 MG/DL (ref 0–1.2)
BUN SERPL-MCNC: 10 MG/DL (ref 8–23)
BUN/CREAT SERPL: 18.2 (ref 7–25)
CALCIUM SPEC-SCNC: 8.5 MG/DL (ref 8.6–10.5)
CHLORIDE SERPL-SCNC: 101 MMOL/L (ref 98–107)
CO2 SERPL-SCNC: 21.7 MMOL/L (ref 22–29)
CREAT SERPL-MCNC: 0.55 MG/DL (ref 0.57–1)
DEPRECATED RDW RBC AUTO: 51.1 FL (ref 37–54)
EGFRCR SERPLBLD CKD-EPI 2021: 92.8 ML/MIN/1.73
EOSINOPHIL # BLD AUTO: 0.17 10*3/MM3 (ref 0–0.4)
EOSINOPHIL NFR BLD AUTO: 2.4 % (ref 0.3–6.2)
ERYTHROCYTE [DISTWIDTH] IN BLOOD BY AUTOMATED COUNT: 14.9 % (ref 12.3–15.4)
GLOBULIN UR ELPH-MCNC: 3.6 GM/DL
GLUCOSE SERPL-MCNC: 110 MG/DL (ref 65–99)
GRAM STN SPEC: ABNORMAL
HCT VFR BLD AUTO: 32.1 % (ref 34–46.6)
HGB BLD-MCNC: 10.8 G/DL (ref 12–15.9)
IMM GRANULOCYTES # BLD AUTO: 0.09 10*3/MM3 (ref 0–0.05)
IMM GRANULOCYTES NFR BLD AUTO: 1.3 % (ref 0–0.5)
LYMPHOCYTES # BLD AUTO: 1.5 10*3/MM3 (ref 0.7–3.1)
LYMPHOCYTES NFR BLD AUTO: 21.1 % (ref 19.6–45.3)
MCH RBC QN AUTO: 31.7 PG (ref 26.6–33)
MCHC RBC AUTO-ENTMCNC: 33.6 G/DL (ref 31.5–35.7)
MCV RBC AUTO: 94.1 FL (ref 79–97)
MONOCYTES # BLD AUTO: 0.51 10*3/MM3 (ref 0.1–0.9)
MONOCYTES NFR BLD AUTO: 7.2 % (ref 5–12)
NEUTROPHILS NFR BLD AUTO: 4.8 10*3/MM3 (ref 1.7–7)
NEUTROPHILS NFR BLD AUTO: 67.4 % (ref 42.7–76)
NRBC BLD AUTO-RTO: 0.1 /100 WBC (ref 0–0.2)
PLATELET # BLD AUTO: 407 10*3/MM3 (ref 140–450)
PMV BLD AUTO: 9.1 FL (ref 6–12)
POTASSIUM SERPL-SCNC: 3.7 MMOL/L (ref 3.5–5.2)
PROT SERPL-MCNC: 6.4 G/DL (ref 6–8.5)
RBC # BLD AUTO: 3.41 10*6/MM3 (ref 3.77–5.28)
SODIUM SERPL-SCNC: 135 MMOL/L (ref 136–145)
WBC NRBC COR # BLD: 7.11 10*3/MM3 (ref 3.4–10.8)

## 2023-09-18 PROCEDURE — 99231 SBSQ HOSP IP/OBS SF/LOW 25: CPT | Performed by: INTERNAL MEDICINE

## 2023-09-18 PROCEDURE — 80053 COMPREHEN METABOLIC PANEL: CPT | Performed by: INTERNAL MEDICINE

## 2023-09-18 PROCEDURE — 85025 COMPLETE CBC W/AUTO DIFF WBC: CPT | Performed by: INTERNAL MEDICINE

## 2023-09-18 RX ADMIN — CETIRIZINE HYDROCHLORIDE 10 MG: 10 TABLET ORAL at 09:35

## 2023-09-18 RX ADMIN — PANTOPRAZOLE SODIUM 40 MG: 40 INJECTION, POWDER, FOR SOLUTION INTRAVENOUS at 21:11

## 2023-09-18 RX ADMIN — DESVENLAFAXINE SUCCINATE 50 MG: 50 TABLET, EXTENDED RELEASE ORAL at 09:35

## 2023-09-18 RX ADMIN — PANTOPRAZOLE SODIUM 40 MG: 40 INJECTION, POWDER, FOR SOLUTION INTRAVENOUS at 09:35

## 2023-09-18 NOTE — DISCHARGE SUMMARY
[facility]    Date of Discharge:  9/18/2023    PCP: Ct Alford APRN    Discharge Diagnosis:   Active Hospital Problems    Diagnosis  POA    **Severe sepsis [A41.9, R65.20]  Yes    Pancreatic mass [K86.89]  Yes    Mixed hyperlipidemia [E78.2]  Yes    Benign essential hypertension [I10]  Yes      Resolved Hospital Problems   No resolved problems to display.          Consults       Date and Time Order Name Status Description    9/13/2023 11:49 AM Inpatient Psychiatrist Consult Completed     9/11/2023  8:27 AM Hematology & Oncology Inpatient Consult Completed     9/10/2023  2:13 PM Inpatient Gastroenterology Consult Completed     9/10/2023 12:34 AM Surgery (on-call MD unless specified) Completed     9/9/2023 10:44 PM [facility] (on-call MD unless specified) Details      8/29/2023 10:07 AM Hematology & Oncology Inpatient Consult Completed           Hospital Course  78 y.o. female  Was just here last week with obstructive jaundice and likely pancreatic cancer since she has pancreatic mass and elevated CA 19-9.  That admission she had biliary stent and brushing.  The brushing is suspicious for cancer but not diagnostic.  She came back with sepsis and looks like cholecystitis.  Not much symptoms surprisingly but labs are still poor.  CT showed that the biliary stent was still patent.  Surgery GI and oncology are following.  She is status post cholecystostomy tube placement.  She is on Zosyn for that.  She had bloody bowel movement and tested positive for EPEC, was on appropriate antibiotic coverage.    Her PCP is also her neighbor and trying to help with coordinating her care.  Patient needs pancreatic biopsy, which unfortunately cannot be done here, she will be transferred to [facility] for further management.  Case was discussed with hospitalist over at [facility].  Total time spent on discharge management is more than 30 minutes.    Temp:  [98.2 °F (36.8 °C)-98.3 °F  (36.8 °C)] 98.3 °F (36.8 °C)  Heart Rate:  [] 89  Resp:  [18] 18  BP: (118-142)/(73-93) 118/93  Body mass index is 20.88 kg/m².    Physical Exam  Vitals and nursing note reviewed.   Constitutional:       General: She is not in acute distress.     Appearance: She is ill-appearing. She is not diaphoretic.   HENT:      Head: Atraumatic.   Eyes:      General: Scleral icterus present.   Cardiovascular:      Rate and Rhythm: Normal rate and regular rhythm.      Pulses: Normal pulses.   Pulmonary:      Effort: Pulmonary effort is normal.      Breath sounds: No wheezing.   Abdominal:      General: There is no distension.      Palpations: Abdomen is soft.      Tenderness: There is no abdominal tenderness. There is no guarding or rebound.   Musculoskeletal:         General: No swelling or tenderness.   Skin:     General: Skin is warm and dry.      Coloration: Skin is jaundiced.   Neurological:      Mental Status: She is alert. Mental status is at baseline.   Psychiatric:      Appears to be very confused      Disposition: Short Term Hospital (DC - External)       Discharge Medications        Continue These Medications        Instructions Start Date   cetirizine 10 MG tablet  Commonly known as: zyrTEC   10 mg, Oral, Daily      cholecalciferol 25 MCG (1000 UT) tablet  Commonly known as: VITAMIN D3   1,000 Units, Oral, Daily      hydrOXYzine 25 MG tablet  Commonly known as: ATARAX   25 mg, Oral, 3 Times Daily PRN      lactulose 10 GM/15ML solution  Commonly known as: CHRONULAC   10 g, Oral, 2 Times Daily PRN      Pristiq 50 MG 24 hr tablet  Generic drug: desvenlafaxine   50 mg, Oral, Daily                Additional Instructions for the Follow-ups that You Need to Schedule       Discharge Follow-up with PCP   As directed       Currently Documented PCP:    Ct Alford APRN    PCP Phone Number:    [phone]     Follow Up Details: Follow up with PCP in 7 days.               Follow-up Information       Wesly Morfin MD  .    Specialties: Gastroenterology, Internal Medicine  Contact information:  [address]  [place] [address]  [phone]               Ct Alford, MORIS .    Specialty: Family Medicine  Why: Follow up with PCP in 7 days.  Contact information:  [address]  [facility]  [facility] [id number] [phone]               Kate Mar MD. Schedule an appointment as soon as possible for a visit in 2 week(s).    Specialties: Hematology and Oncology, Oncology, Hematology  Contact information:  [address] [facility] [address]  [place] [id number] [phone]                            Future Appointments   Date Time Provider Department Center   12/1/2023  8:45 AM Barb Acosta APRN [facility]LEN[facility] NAPOLEON Staley MD  [facility]  09/18/23    Discharge time spent greater than 30 minutes.

## 2023-09-18 NOTE — PROGRESS NOTES
Subjective     CHIEF COMPLAINT:     Pancreatic mass    INTERVAL HISTORY:     Patient reports improvement in the abdominal pain.  She is complaining of difficulty with sleep.    September 18, 2023: Patient is being transferred to [facility].  Patient is afebrile    REVIEW OF SYSTEMS:  A comprehensive review of systems was obtained with pertinent positive findings as noted in the interval history above.  All other systems negative.    SCHEDULED MEDS:  cetirizine, 10 mg, Oral, Daily  desvenlafaxine, 50 mg, Oral, Daily  pantoprazole, 40 mg, Intravenous, Q12H      INFUSIONS:       Objective   VITAL SIGNS:  Temp:  [98.2 °F (36.8 °C)-98.3 °F (36.8 °C)] 98.3 °F (36.8 °C)  Heart Rate:  [] 89  Resp:  [18] 18  BP: (118-142)/(73-93) 118/93     PHYSICAL EXAMINATION:    GENERAL: Weak.  She is in pain.  SKIN: No skin rash. No ecchymosis.   HEAD:  Normocephalic.  EYES: Jaundice.  Pallor.  NECK:  Supple. No Masses.  LYMPHATICS:  No cervical or supraclavicular lymphadenopathy.  CHEST: Normal respiratory effort. Lungs clear to auscultation.   CARDIAC:  Normal S1 & S2. No murmur.   ABDOMEN: Decrease in the tenderness in the right upper quadrant.  Cholecystostomy tube is functional.  EXTREMITIES:  No edema.  PSYCH: Anxious.  I have reexamined the patient and the results are consistent with the previously documented exam. Annita Schroeder MD     RESULT REVIEW:   Results from last 7 days   Lab Units 09/18/23  0451 09/17/23  0655 09/16/23  0700 09/15/23  0635 09/14/23  0751 09/13/23  1610 09/13/23  0448 09/12/23  1557 09/12/23  0318 09/11/23  2357 09/11/23  1841   WBC 10*3/mm3 7.11 8.87 10.30 10.45 11.10*   < > 19.84*   < > 18.71*   < > 17.67*   NEUTROS ABS 10*3/mm3 4.80 6.45 7.51* 7.82* 9.06*  --  17.86*  --  17.01*  --  16.26*   LYMPHS ABS 10*3/mm3  --   --   --   --   --   --  0.99  --  1.14  --  1.06   HEMOGLOBIN g/dL 10.8* 10.2* 9.5* 9.1* 8.8*   < > 8.9*   < > 7.5*   < > 8.0*   HEMATOCRIT % 32.1* 30.4* 28.3* 26.9* 25.8*   <  > 26.7*   < > 22.9*   < > 23.7*   PLATELETS 10*3/mm3 407 374 350 354 344   < > 365   < > 403   < > 380    < > = values in this interval not displayed.     Results from last 7 days   Lab Units 09/18/23  0451 09/17/23  0655 09/16/23  0700 09/15/23  1707 09/15/23  0635 09/14/23  1858 09/14/23  0751 09/13/23  0448 09/12/23  1932   SODIUM mmol/L 135* 134* 137  --  135*  --  139   < >  --    POTASSIUM mmol/L 3.7 3.8 4.0 4.3 3.5   < > 3.3*   < > 3.4*   CHLORIDE mmol/L 101 103 105  --  104  --  106   < >  --    CO2 mmol/L 21.7* 23.0 23.0  --  23.0  --  24.4   < >  --    BUN mg/dL 10 10 11  --  14  --  17   < >  --    CREATININE mg/dL 0.55* 0.63 0.59  --  0.56*  --  0.54*   < >  --    CALCIUM mg/dL 8.5* 8.4* 8.2*  --  8.1*  --  7.9*   < > 8.2*   ALBUMIN g/dL 2.8* 2.6* 2.4*  --  2.1*  --  2.0*   < >  --    BILIRUBIN mg/dL 3.6* 3.4* 3.6*  --  4.3*  --  5.8*   < >  --    ALK PHOS U/L 304* 361* 434*  --  482*  --  628*   < >  --    ALT (SGPT) U/L 49* 53* 69*  --  88*  --  128*   < >  --    AST (SGOT) U/L 35* 35* 40*  --  66*  --  146*   < >  --    MAGNESIUM mg/dL  --   --   --   --   --   --   --   --  1.8    < > = values in this interval not displayed.               Lab 09/12/23 0319 09/12/23 0318   IRON  --  36*   IRON SATURATION (TSAT)  --  20   TIBC  --  183*   TRANSFERRIN  --  123*   FERRITIN  --  915.00*   FOLATE 9.61  --    VITAMIN B 12 935  --         Assessment & Plan   *Suspected pancreatic mass on CT on 8/26/2023.  Patient had biliary obstruction with severe intra and extrahepatic biliary ductal dilatation.  The common bile duct was abruptly decompressed.  This was considered to be concerning for pancreatic malignancy.  MRI abdomen on 8/27/2023 revealed a hypoenhancing pancreatic mass within the uncinate process and pancreatic head resulting in obstruction of the common bile duct and severe upstream intra and extrahepatic biliary ductal dilatation.  S/p ERCP with biliary duct stent placement on 8/29/2023.  Cytology  from 8/29/2023 revealed scattered atypical ductal/glandular cells suspicious for well-differentiated neoplasm.  This was considered nondiagnostic of neoplasm.  CT on 9/10/2023 revealed interval placement of the biliary stent with resolution of the biliary duct dilatation. No pancreatic mass was identified.  Patient has cholecystostomy tube placed on 9/12/2023.  Bilirubin decreased since the placement of the jejunostomy tube.  September 18, 2023: Patient is being transferred to [facility] for endoscopic biopsy     *Left ovarian/adnexal cyst.  It was first identified on CT on 8/26/2023 measuring 5.7 cm.  Ultrasound on 8/30/2023 showed the lesion to have suspicious features.  There was endometrial thickening.  CT on 9/10/2023 showed the left adnexal lesion to be stable     *Anemia.  9/9/2023: Hemoglobin 11.9.    9/11/2023: Hemoglobin decreased to 8.6.  9/12/2023: Hemoglobin decreased to 7.5.  S/p 1 unit PRBC transfusion.  Anemia work-up showed no evidence of iron vitamin B12 or folate deficiency.  9/13/2023: Hemoglobin improved to 9.9.  9/14/2023: Hemoglobin decreased to 8.8.  ?  GI bleeding.  GI is following and indicated that they may do EGD if open continues to decline.     PLAN:     1.  Monitor CBC.  2.  Since she is unable to have EUS at this hospital, plan is for referral to [facility] to have the EUS and possible biopsy.  Patient actually has been transferred to [facility] for endoscopic biopsy  3.  Patient to follow-up with Dr Mar in 2 weeks    Patient is likely to be transferred to [facility] for endoscopic ultrasound.    Annita Schroeder MD  09/18/23

## 2023-09-18 NOTE — CASE MANAGEMENT/SOCIAL WORK
Continued Stay Note  [facility]     Patient Name: Lizzie Bose  MRN: [id number]  Today's Date: 9/18/2023    Admit Date: 9/9/2023    Plan: Transfer to [facility] for EUS - waiting bed assignment   Discharge Plan       Row Name 09/18/23 1346       Plan    Plan Transfer to [facility] for EUS - waiting bed assignment    Patient/Family in Agreement with Plan yes    Plan Comments Admitting MD has arranged for patient to DC to [facility] for EUS procedure.  Call to [facility] [phone] - they are waiting for a bed to be available.  Spoke with radiology - CD of images obtained.  Spoke with [facility] - they are aware of expected transfer.  Spoke with son Ari by telephone and he is aware/agreeable of transfer to [facility].  CCP will F/U with transfer center again later.  Karsten JIMENEZ                 Expected Discharge Date and Time       Expected Discharge Date Expected Discharge Time    Sep 18, 2023               Becky S. [place], RN

## 2023-09-18 NOTE — PLAN OF CARE
Goal Outcome Evaluation:  Plan of Care Reviewed With: patient        Progress: improving  Outcome Evaluation: vss. telemetry monitor, sr/st. alert and oriented x4, room air. up ad henrique. to be transferred to [facility] today for biopsy. awaiting room placement.

## 2023-09-19 VITALS
DIASTOLIC BLOOD PRESSURE: 70 MMHG | BODY MASS INDEX: 20.91 KG/M2 | HEART RATE: 88 BPM | WEIGHT: 125.5 LBS | TEMPERATURE: 98.1 F | RESPIRATION RATE: 18 BRPM | OXYGEN SATURATION: 96 % | HEIGHT: 65 IN | SYSTOLIC BLOOD PRESSURE: 123 MMHG

## 2023-09-19 LAB
ALBUMIN SERPL-MCNC: 2.8 G/DL (ref 3.5–5.2)
ALBUMIN/GLOB SERPL: 0.8 G/DL
ALP SERPL-CCNC: 277 U/L (ref 39–117)
ALT SERPL W P-5'-P-CCNC: 44 U/L (ref 1–33)
ANION GAP SERPL CALCULATED.3IONS-SCNC: 12 MMOL/L (ref 5–15)
AST SERPL-CCNC: 35 U/L (ref 1–32)
BASOPHILS # BLD AUTO: 0.04 10*3/MM3 (ref 0–0.2)
BASOPHILS NFR BLD AUTO: 0.7 % (ref 0–1.5)
BILIRUB SERPL-MCNC: 3.1 MG/DL (ref 0–1.2)
BUN SERPL-MCNC: 14 MG/DL (ref 8–23)
BUN/CREAT SERPL: 23.3 (ref 7–25)
CALCIUM SPEC-SCNC: 8.8 MG/DL (ref 8.6–10.5)
CHLORIDE SERPL-SCNC: 101 MMOL/L (ref 98–107)
CO2 SERPL-SCNC: 23 MMOL/L (ref 22–29)
CREAT SERPL-MCNC: 0.6 MG/DL (ref 0.57–1)
DEPRECATED RDW RBC AUTO: 48.2 FL (ref 37–54)
EGFRCR SERPLBLD CKD-EPI 2021: 90.9 ML/MIN/1.73
EOSINOPHIL # BLD AUTO: 0.17 10*3/MM3 (ref 0–0.4)
EOSINOPHIL NFR BLD AUTO: 2.8 % (ref 0.3–6.2)
ERYTHROCYTE [DISTWIDTH] IN BLOOD BY AUTOMATED COUNT: 14.4 % (ref 12.3–15.4)
GLOBULIN UR ELPH-MCNC: 3.5 GM/DL
GLUCOSE SERPL-MCNC: 161 MG/DL (ref 65–99)
HCT VFR BLD AUTO: 31.2 % (ref 34–46.6)
HGB BLD-MCNC: 10.5 G/DL (ref 12–15.9)
IMM GRANULOCYTES # BLD AUTO: 0.05 10*3/MM3 (ref 0–0.05)
IMM GRANULOCYTES NFR BLD AUTO: 0.8 % (ref 0–0.5)
LYMPHOCYTES # BLD AUTO: 1.14 10*3/MM3 (ref 0.7–3.1)
LYMPHOCYTES NFR BLD AUTO: 18.7 % (ref 19.6–45.3)
MCH RBC QN AUTO: 31.3 PG (ref 26.6–33)
MCHC RBC AUTO-ENTMCNC: 33.7 G/DL (ref 31.5–35.7)
MCV RBC AUTO: 92.9 FL (ref 79–97)
MONOCYTES # BLD AUTO: 0.53 10*3/MM3 (ref 0.1–0.9)
MONOCYTES NFR BLD AUTO: 8.7 % (ref 5–12)
NEUTROPHILS NFR BLD AUTO: 4.16 10*3/MM3 (ref 1.7–7)
NEUTROPHILS NFR BLD AUTO: 68.3 % (ref 42.7–76)
NRBC BLD AUTO-RTO: 0.2 /100 WBC (ref 0–0.2)
PLATELET # BLD AUTO: 403 10*3/MM3 (ref 140–450)
PMV BLD AUTO: 9 FL (ref 6–12)
POTASSIUM SERPL-SCNC: 3.7 MMOL/L (ref 3.5–5.2)
PROT SERPL-MCNC: 6.3 G/DL (ref 6–8.5)
RBC # BLD AUTO: 3.36 10*6/MM3 (ref 3.77–5.28)
SODIUM SERPL-SCNC: 136 MMOL/L (ref 136–145)
WBC NRBC COR # BLD: 6.09 10*3/MM3 (ref 3.4–10.8)

## 2023-09-19 PROCEDURE — 97116 GAIT TRAINING THERAPY: CPT

## 2023-09-19 PROCEDURE — 80053 COMPREHEN METABOLIC PANEL: CPT | Performed by: INTERNAL MEDICINE

## 2023-09-19 PROCEDURE — 97530 THERAPEUTIC ACTIVITIES: CPT

## 2023-09-19 PROCEDURE — 85025 COMPLETE CBC W/AUTO DIFF WBC: CPT | Performed by: INTERNAL MEDICINE

## 2023-09-19 RX ADMIN — PANTOPRAZOLE SODIUM 40 MG: 40 INJECTION, POWDER, FOR SOLUTION INTRAVENOUS at 09:29

## 2023-09-19 RX ADMIN — PANTOPRAZOLE SODIUM 40 MG: 40 INJECTION, POWDER, FOR SOLUTION INTRAVENOUS at 20:05

## 2023-09-19 RX ADMIN — CETIRIZINE HYDROCHLORIDE 10 MG: 10 TABLET ORAL at 09:29

## 2023-09-19 RX ADMIN — DESVENLAFAXINE SUCCINATE 50 MG: 50 TABLET, EXTENDED RELEASE ORAL at 09:29

## 2023-09-19 NOTE — PLAN OF CARE
Goal Outcome Evaluation:  Plan of Care Reviewed With: patient        Progress: improving  Outcome Evaluation: vss. telemetry monitor, sr. alert and oriented x4, room air. up ad henrique. to be transferred today, [facility] paged regarding , awaiting call back.

## 2023-09-19 NOTE — THERAPY TREATMENT NOTE
Patient Name: Lizzie Bose  : 1945    MRN: [id number]                              Today's Date: 2023       Admit Date: 2023    Visit Dx:     ICD-10-CM ICD-9-CM   1. Severe sepsis  A41.9 038.9    R65.20 995.92   2. Jaundice  R17 782.4   3. Generalized weakness  R53.1 780.79   4. Diarrhea, unspecified type  R19.7 787.91   5. Pancreatic mass  K86.89 577.8     Patient Active Problem List   Diagnosis    PAT (obstructive sleep apnea)    Lumbar pain    Primary osteoarthritis of left knee    Primary osteoarthritis of right knee    Benign essential hypertension    Mixed hyperlipidemia    Vitamin D deficiency    Age-related osteoporosis without current pathological fracture    Hyperinsulinism    Chronic insomnia    Attention deficit hyperactivity disorder (ADHD), combined type    Dyspnea on exertion    Dyslipidemia    Biliary obstruction    Elevated liver enzymes    Pancreatic mass    Severe sepsis     Past Medical History:   Diagnosis Date    ADD (attention deficit disorder)     Arthritis     Depression     High blood pressure     History of Clostridioides difficile infection     Hyperlipidemia     Low back pain     Right knee pain     Sleep apnea     NO USING MACHINE     Past Surgical History:   Procedure Laterality Date    APPENDECTOMY      CARPAL TUNNEL RELEASE Right     CATARACT EXTRACTION Bilateral     ERCP WITH STENT PLACEMENT N/A 2023    Procedure: ENDOSCOPIC RETROGRADE CHOLANGIOPANCREATOGRAPHY WITH SPHINCTEROTOMY; BRUSHING AND STENT PLACEMENT;  Surgeon: Catarino Mg MD;  Location: [facility] ENDOSCOPY;  Service: Gastroenterology;  Laterality: N/A;  pre-biliary obstruction  post-    HAMMER TOE REPAIR Left     HIP ARTHROPLASTY Left     TOTAL KNEE ARTHROPLASTY Right 2020    Procedure: TOTAL KNEE ARTHROPLASTY;  Surgeon: Krish Ramirez MD;  [facility] OR;  Service: Orthopedics;  Laterality: Right;      General Information       Row Name 23 1515          Physical Therapy  Time and Intention    Document Type therapy note (daily note)  -     Mode of Treatment physical therapy  -       Row Name 09/19/23 1515          General Information    Existing Precautions/Restrictions fall  -       Row Name 09/19/23 1515          Cognition    Orientation Status (Cognition) oriented x 3  -SM               User Key  (r) = Recorded By, (t) = Taken By, (c) = Cosigned By      Initials Name Provider Type     Leslie Petty PTA Physical Therapist Assistant                   Mobility       Row Name 09/19/23 1516          Bed Mobility    Bed Mobility supine-sit;sit-supine  -     Supine-Sit [place] (Bed Mobility) modified independence  -     Sit-Supine [place] (Bed Mobility) modified independence  -     Assistive Device (Bed Mobility) bed rails;head of bed elevated  -       Row Name 09/19/23 1516          Sit-Stand Transfer    Sit-Stand [place] (Transfers) supervision  -       Row Name 09/19/23 1516          Gait/Stairs (Locomotion)    [place] Level (Gait) standby assist  -     Distance in Feet (Gait) 300  -SM               User Key  (r) = Recorded By, (t) = Taken By, (c) = Cosigned By      Initials Name Provider Type     Leslie Petty PTA Physical Therapist Assistant                   Obj/Interventions    No documentation.                  Goals/Plan    No documentation.                  Clinical Impression       Row Name 09/19/23 1516          Pain    Pretreatment Pain Rating 0/10 - no pain  -     Posttreatment Pain Rating 0/10 - no pain  -       Row Name 09/19/23 1516          Positioning and Restraints    Pre-Treatment Position in bed  -SM     Post Treatment Position bed  -SM     In Bed supine;call light within reach;encouraged to call for assist  -               User Key  (r) = Recorded By, (t) = Taken By, (c) = Cosigned By      Initials Name Provider Type     Leslie Petty PTA Physical Therapist Assistant                   Outcome  Measures       Row Name 09/19/23 1518          How much help from another person do you currently need...    Turning from your back to your side while in flat bed without using bedrails? 4  -SM     Moving from lying on back to sitting on the side of a flat bed without bedrails? 3  -SM     Moving to and from a bed to a chair (including a wheelchair)? 4  -SM     Standing up from a chair using your arms (e.g., wheelchair, bedside chair)? 4  -SM     Climbing 3-5 steps with a railing? 3  -SM     To walk in hospital room? 4  -SM     AM-PAC 6 Clicks Score (PT) 22  -     Highest level of mobility 7 --> Walked 25 feet or more  -       Row Name 09/19/23 1518          Functional Assessment    Outcome Measure Options AM-PAC 6 Clicks Basic Mobility (PT)  -               User Key  (r) = Recorded By, (t) = Taken By, (c) = Cosigned By      Initials Name Provider Type    Leslie Mata, PTA Physical Therapist Assistant                                 Physical Therapy Education       Title: PT OT SLP Therapies (Done)       Topic: Physical Therapy (Resolved)       Point: Mobility training (Resolved)       Learning Progress Summary             Patient Acceptance, E,TB,D, VU,NR by  at 9/19/2023 1518    Acceptance, E,TB, VU by PH at 9/15/2023 1555    Acceptance, E, VU,NR by  at 9/14/2023 1512                         Point: Home exercise program (Resolved)       Learning Progress Summary             Patient Acceptance, E,TB,D, VU,NR by  at 9/19/2023 1518                         Point: Body mechanics (Resolved)       Learning Progress Summary             Patient Acceptance, E,TB,D, VU,NR by  at 9/19/2023 1518    Acceptance, E,TB, VU by PH at 9/15/2023 1555                         Point: Precautions (Resolved)       Learning Progress Summary             Patient Acceptance, E,TB,D, VU,NR by  at 9/19/2023 1518    Acceptance, E,TB, VU by  at 9/15/2023 1555                                         User Key        Initials Effective Dates Name Provider Type Discipline     06/16/21 -  Stephanie Orta PT Physical Therapist PT     03/07/18 -  Leslie Petty PTA Physical Therapist Assistant PT     06/16/21 -  Angela Beckman PTA Physical Therapist Assistant PT                  PT Recommendation and Plan     Plan of Care Reviewed With: patient  Progress: improving  Outcome Evaluation: Pt tolerated treatment well this date. Increased gait distance to 300ft w/ SBA only, no AD. Pt up ad henrique in room, and encouraged pt to ambulate throughout the day. Also encouraged pt to attempt a few exercises on own during the day. Pt awaiting transfer to [facility] for biopsy, PT will sign off at this time.     Time Calculation:         PT Charges       Row Name 09/19/23 1521             Time Calculation    Start Time 1439  -      Stop Time 1505  -      Time Calculation (min) 26 min  -      PT Received On 09/19/23  -                User Key  (r) = Recorded By, (t) = Taken By, (c) = Cosigned By      Initials Name Provider Type     Leslie Petty PTA Physical Therapist Assistant                  Therapy Charges for Today       Code Description Service Date Service Provider Modifiers Qty    32597048570 HC GAIT TRAINING EA 15 MIN 9/19/2023 Leslie Petty PTA GP 1    18578539353 HC PT THERAPEUTIC ACT EA 15 MIN 9/19/2023 Leslie Petty PTA GP 1            PT G-Codes  Outcome Measure Options: AM-PAC 6 Clicks Basic Mobility (PT)  AM-PAC 6 Clicks Score (PT): 22  PT Discharge Summary  Anticipated Discharge Disposition (PT): home with assist (pt being transferred to [facility] first)    Leslie Petty PTA  9/19/2023

## 2023-09-19 NOTE — NURSING NOTE
Patient alert and oriented.Anxious at times.Tube still in place output.Medicated per MAR.Awaiting bed at [facility].No acute changes.NSR

## 2023-09-19 NOTE — PROGRESS NOTES
"Physicians Statement of Medical Necessity for  Ambulance Transportation    GENERAL INFORMATION     Name: Lizzie Bose  YOB: 1945  Medicare #: attach facesheet_____  Transport Date: _______________ (Valid for round trips this date, or for scheduled repetitive trips for 60 days from the date signed below.)  Origin: [facility] 6S 611  Destination: [facility]  Is the Patient's stay covered under Medicare Part A (PPS/DRG?)Yes  Closest appropriate facility? Yes  If this a hosp-hosp transfer? [facility]   Is this a hospice patient? No    MEDICAL NECESSITY QUESTIONAIRE    Ambulance Transportation is medically necessary only if other means of transportation are contraindicated or would be potentially harmful to the patient.  To meet this requirement, the patient must be either \"bed confined\" or suffer from a condition such that transport by means other than an ambulance is contraindicated by the patient's condition.  The following questions must be answered by the healthcare professional signing below for this form to be valid:     1) Describe the MEDICAL CONDITION (physical and/or mental) of this patient AT THE TIME OF AMBULANCE TRANSPORT that requires the patient to be transported in an ambulance, and why transport by other means is contraindicated by the patient's condition:   Past Medical History:   Diagnosis Date    ADD (attention deficit disorder)     Arthritis     Depression     High blood pressure     History of Clostridioides difficile infection     Hyperlipidemia     Low back pain     Right knee pain     Sleep apnea     NO USING MACHINE      Past Surgical History:   Procedure Laterality Date    APPENDECTOMY      CARPAL TUNNEL RELEASE Right     CATARACT EXTRACTION Bilateral     ERCP WITH STENT PLACEMENT N/A 8/29/2023    Procedure: ENDOSCOPIC RETROGRADE CHOLANGIOPANCREATOGRAPHY WITH SPHINCTEROTOMY; BRUSHING AND STENT PLACEMENT;  Surgeon: Catarino Mg MD;  Location: [facility] ENDOSCOPY;  Service: " "Gastroenterology;  Laterality: N/A;  pre-biliary obstruction  post-    HAMMER TOE REPAIR Left     HIP ARTHROPLASTY Left     TOTAL KNEE ARTHROPLASTY Right 7/13/2020    Procedure: TOTAL KNEE ARTHROPLASTY;  Surgeon: Krish Ramirez MD;  Location: [facility];  Service: Orthopedics;  Laterality: Right;      2) Is this patient \"bed confined\" as defined below?Yes   To be \"bed confined\" the patient must satisfy all three of the following criteria:  (1) unable to get up from bed without assistance; AND (2) unable to ambulate;  AND (3) unable to sit in a chair or wheelchair.  3) Can this patient safely be transported by car or wheelchair van (I.e., may safely sit during transport, without an attendant or monitoring?)No   4. In addition to completing questions 1-3 above, please check any of the following conditions that apply*:          *Note: supporting documentation for any boxes checked must be maintained in the patient's medical records Patient is confused and Medical attendant required      SIGNATURE OF PHYSICIAN OR OTHER AUTHORIZED HEALTHCARE PROFESSIONAL    I certify that the above information is true and correct based on my evaluation of this patient, and represent that the patient requires transport by ambulance and that other forms of transport are contraindicated.  I understand that this information will be used by the Centers for Medicare and Medicaid Services (CMS) to support the determiniation of medical necessity for ambulance services, and I represent that I have personal knowledge of the patient's condition at the time of transport.       If this box is checked, I also certify that the patient is physically or mentally incapable of signing the ambulance service's claim form and that the institution with which I am affiliated has furnished care, services or assistance to the patient.  My signature below is made on behalf of the patient pursuant to 42 .36(b)(4). In accordance with 42 .37, the " specific reason(s) that the patient is physically or mentally incapable of signing the claim for is as follows:     Signature of Physician or Healthcare Professional  Date/Time:        (For Scheduled repetitive transport, this form is not valid for transports performed more than 60 days after this date).                                                                                                                                            --------------------------------------------------------------------------------------------  Printed Name and Credentials of Physician or Authorized Healthcare Professional     *Form must be signed by patient's attending physician for scheduled, repetitive transports,.  For non-repetitive ambulance transports, if unable to obtain the signature of the attending physician, any of the following may sign (please select below):     Physician  Clinical Nurse Specialist  Registered Nurse     Physician Assistant  Discharge Planner  Licensed Practical Nurse     Nurse Practitioner

## 2023-09-19 NOTE — PROGRESS NOTES
"Nutrition Services    Patient Name:  Lizzie Bose  YOB: 1945  MRN: [id number]  Admit Date:  9/9/2023    FOLLOW UP - CLINICAL NUTRITION    Assessment Date:  09/19/23    Encounter Information         Current Summary Abdominal pain improving.  Anxious at times.  % x 2 meals per chart PO data.  Awaiting bed at [facility] (plans for transfer here to perform pancreatic biopsy) - possibly today.     Current Nutrition Orders & Evaluation of Intake       Oral Nutrition     Current PO Diet Diet: Regular/House Diet; Texture: Regular Texture (IDDSI 7); Fluid Consistency: Thin (IDDSI 0)   Supplement n/a   PO Evaluation     PO Intake % %    Factors Affecting Intake  No factors at this time   --  Anthropometrics          Height    Weight Height: 165.1 cm (65\")  Weight: 56.9 kg (125 lb 8 oz) (09/10/23 0308)    BMI kg/m2 Body mass index is 20.88 kg/m².  Normal/Healthy (18.4 - 24.9)    Weight trend Loss     Labs        Pertinent Labs Reviewed, listed below     Results from last 7 days   Lab Units 09/19/23  0716 09/18/23  0451 09/17/23  0655   SODIUM mmol/L 136 135* 134*   POTASSIUM mmol/L 3.7 3.7 3.8   CHLORIDE mmol/L 101 101 103   CO2 mmol/L 23.0 21.7* 23.0   BUN mg/dL 14 10 10   CREATININE mg/dL 0.60 0.55* 0.63   CALCIUM mg/dL 8.8 8.5* 8.4*   BILIRUBIN mg/dL 3.1* 3.6* 3.4*   ALK PHOS U/L 277* 304* 361*   ALT (SGPT) U/L 44* 49* 53*   AST (SGOT) U/L 35* 35* 35*   GLUCOSE mg/dL 161* 110* 107*       Results from last 7 days   Lab Units 09/19/23  0716 09/13/23  1610 09/13/23  0448 09/13/23  0005 09/12/23 1932   MAGNESIUM mg/dL  --   --   --   --  1.8   PHOSPHORUS mg/dL  --   --  2.9  --  2.2*   HEMOGLOBIN g/dL 10.5*   < > 8.9*   < >  --    HEMATOCRIT % 31.2*   < > 26.7*   < >  --    WBC 10*3/mm3 6.09   < > 19.84*   < >  --    ALBUMIN g/dL 2.8*   < > 2.2*  --   --     < > = values in this interval not displayed.       Results from last 7 days   Lab Units 09/19/23  0716 09/18/23  0451 09/17/23  0655 " 09/16/23  0700 09/15/23  0635   PLATELETS 10*3/mm3 403 407 374 350 354       COVID19   Date Value Ref Range Status   07/10/2020 Not Detected Not Detected - Ref. Range Final     Lab Results   Component Value Date    HGBA1C 6.0 (H) 11/03/2021          Medications            Scheduled Medications cetirizine, 10 mg, Oral, Daily  desvenlafaxine, 50 mg, Oral, Daily  pantoprazole, 40 mg, Intravenous, Q12H        Infusions      PRN Medications   aluminum-magnesium hydroxide-simethicone    Calcium Replacement - Follow Nurse / BPA Driven Protocol    HYDROmorphone    hydrOXYzine    lactulose    Magnesium Standard Dose Replacement - Follow Nurse / BPA Driven Protocol    melatonin    nitroglycerin    ondansetron **OR** ondansetron    Phosphorus Replacement - Follow Nurse / BPA Driven Protocol    Potassium Replacement - Follow Nurse / BPA Driven Protocol    sodium chloride    traMADol     Physical Findings          General Appearance alert, oriented, room air   Oral/Mouth Cavity dental appliance, tooth or teeth missing   Edema  lower extremity , 1+ (trace)   Gastrointestinal fecal incontinence, hypoactive bowel sounds, last bowel movement: 9/18   Skin  bruising, jaundice   Tubes/Drains/Lines none, drain, Cholecystostomy tube   NFPE Not indicated at this time   --  NUTRITION INTERVENTION / PLAN OF CARE  Intervention Goal         Intervention Goal(s) Maintain nutrition status, Reduce/improve symptoms, Disease management/therapy, Initiate feeding/diet, and Maintain weight     Nutrition Intervention         RD Action Encourage intake, Continue to monitor, and Care plan reviewed     Nutrition Prescription         Diet Prescription     Supplement Prescription n/a   EN/PN Prescription    New Prescription Ordered? No changes at this time   --  Monitor/Evaluation        Monitor Per protocol, I&O, Pertinent labs, Weight, GI status, Symptoms   Discharge Needs Pending clinical course   Education Will instruct as appropriate   --    RD to  follow up per protocol.    Electronically signed by:  Karely Samayoa RD  09/19/23 12:54 EDT

## 2023-09-19 NOTE — PROGRESS NOTES
Continued Stay Note  [facility]     Patient Name: Lizzie Bose  MRN: [id number]  Today's Date: 9/19/2023    Admit Date: 9/9/2023    Plan: Transfer to [facility], waiting on bed   Discharge Plan       Row Name 09/19/23 1159       Plan    Plan Transfer to [facility], waiting on bed    Plan Comments Spoke with [facility] transfer line [phone], [facility] does not have a bed at this time, but they are expecting d/c later today.  [facility] will call 6S nurses station when a bed becomes available. 6S to notify [facility] once we have a bed # [id number]. Transfer packet and pertinent medical record in [facility].                   Discharge Codes    No documentation.                 Expected Discharge Date and Time       Expected Discharge Date Expected Discharge Time    Sep 20, 2023               Susanna Beard RN

## 2023-09-19 NOTE — NURSING NOTE
Report called to Saqib at [facility]. Will be going to 8 towers, room 884. Facesheet faxed. Son, julio called and updated.

## 2023-09-19 NOTE — PLAN OF CARE
Goal Outcome Evaluation:  Plan of Care Reviewed With: patient        Progress: improving  Outcome Evaluation: Pt tolerated treatment well this date. Increased gait distance to 300ft w/ SBA only, no AD. Pt up ad henrique in room, and encouraged pt to ambulate throughout the day. Also encouraged pt to attempt a few exercises on own during the day. Pt awaiting transfer to [facility] for biopsy, PT will sign off at this time.      Anticipated Discharge Disposition (PT): home with assist (pt being transferred to [facility] first)

## 2023-09-19 NOTE — PROGRESS NOTES
Name: Lizzie Bose ADMIT: 2023   : 1945  PCP: Ct Alford APRN    MRN: [id number] LOS: 10 days   AGE/SEX: 78 y.o. female  ROOM: [facility]     Subjective   Subjective   No acute events. Patient denies new complaints. Taking PO. No CP/dyspnea/f/c/n/v/d.    Objective   Objective   Vital Signs  Temp:  [97.7 °F (36.5 °C)-97.9 °F (36.6 °C)] 97.9 °F (36.6 °C)  Heart Rate:  [] 100  Resp:  [18] 18  BP: (128-136)/(79-93) 129/81  SpO2:  [98 %-99 %] 99 %  on   ;   Device (Oxygen Therapy): room air  Body mass index is 20.88 kg/m².  Physical Exam  Vitals and nursing note reviewed.   Constitutional:       General: She is not in acute distress.     Appearance: She is ill-appearing. She is not toxic-appearing or diaphoretic.   HENT:      Head: Normocephalic and atraumatic.      Nose: Nose normal.      Mouth/Throat:      Mouth: Mucous membranes are moist.      Pharynx: Oropharynx is clear.   Eyes:      General: Scleral icterus present.      Pupils: Pupils are equal, round, and reactive to light.   Cardiovascular:      Rate and Rhythm: Normal rate and regular rhythm.      Pulses: Normal pulses.   Pulmonary:      Effort: Pulmonary effort is normal.      Breath sounds: Normal breath sounds.   Abdominal:      General: Bowel sounds are normal.      Palpations: Abdomen is soft.      Tenderness: There is no abdominal tenderness.   Musculoskeletal:         General: No swelling or tenderness.      Cervical back: Normal range of motion and neck supple.   Skin:     General: Skin is warm and dry.      Capillary Refill: Capillary refill takes less than 2 seconds.      Coloration: Skin is jaundiced.   Neurological:      General: No focal deficit present.      Mental Status: She is alert and oriented to person, place, and time.   Psychiatric:         Mood and Affect: Mood normal.         Behavior: Behavior normal.     Results Review     I reviewed the patient's new clinical results.  Results from last 7 days   Lab Units  09/19/23  0716 09/18/23 0451 09/17/23  0655 09/16/23  0700   WBC 10*3/mm3 6.09 7.11 8.87 10.30   HEMOGLOBIN g/dL 10.5* 10.8* 10.2* 9.5*   PLATELETS 10*3/mm3 403 407 374 350     Results from last 7 days   Lab Units 09/19/23  0716 09/18/23 0451 09/17/23  0655 09/16/23  0700   SODIUM mmol/L 136 135* 134* 137   POTASSIUM mmol/L 3.7 3.7 3.8 4.0   CHLORIDE mmol/L 101 101 103 105   CO2 mmol/L 23.0 21.7* 23.0 23.0   BUN mg/dL 14 10 10 11   CREATININE mg/dL 0.60 0.55* 0.63 0.59   GLUCOSE mg/dL 161* 110* 107* 124*   EGFR mL/min/1.73 90.9 92.8 89.8 91.2     Results from last 7 days   Lab Units 09/19/23  0716 09/18/23 0451 09/17/23 0655 09/16/23  0700   ALBUMIN g/dL 2.8* 2.8* 2.6* 2.4*   BILIRUBIN mg/dL 3.1* 3.6* 3.4* 3.6*   ALK PHOS U/L 277* 304* 361* 434*   AST (SGOT) U/L 35* 35* 35* 40*   ALT (SGPT) U/L 44* 49* 53* 69*     Results from last 7 days   Lab Units 09/19/23 0716 09/18/23 0451 09/17/23 0655 09/16/23  0700 09/14/23 0751 09/13/23 0448 09/12/23  1932   CALCIUM mg/dL 8.8 8.5* 8.4* 8.2*   < > 8.2* 8.2*   ALBUMIN g/dL 2.8* 2.8* 2.6* 2.4*   < > 2.2*  --    MAGNESIUM mg/dL  --   --   --   --   --   --  1.8   PHOSPHORUS mg/dL  --   --   --   --   --  2.9 2.2*    < > = values in this interval not displayed.       No results found for: HGBA1C, POCGLU    No radiology results for the last day    I have personally reviewed all medications:  Scheduled Medications  cetirizine, 10 mg, Oral, Daily  desvenlafaxine, 50 mg, Oral, Daily  pantoprazole, 40 mg, Intravenous, Q12H    Infusions   Diet  Diet: Regular/House Diet; Texture: Regular Texture (IDDSI 7); Fluid Consistency: Thin (IDDSI 0)    I have personally reviewed:  [x]  Laboratory   [x]  Microbiology   [x]  Radiology   [x]  EKG/Telemetry  []  Cardiology/Vascular   []  Pathology    [x]  Records    Assessment/Plan     Active Hospital Problems    Diagnosis  POA    **Severe sepsis [A41.9, R65.20]  Yes    Pancreatic mass [K86.89]  Yes    Mixed hyperlipidemia [E78.2]  Yes     Benign essential hypertension [I10]  Yes      Resolved Hospital Problems   No resolved problems to display.     Patient has completed abx for her cholecystitis and EPEC diarrhea. Symptoms are well-controlled and her labs as stable. We will continue to monitor as she is awaiting transfer to [facility] for a pancreatic mass biopsy. We are awaiting bed availability and transportation.    Hammad Suarez MD  [facility]  09/19/23  17:18 EDT

## 2023-09-20 NOTE — CASE MANAGEMENT/SOCIAL WORK
Case Management Discharge Note      Final Note: DC'd to [facility] via [facility] 9/19         Selected Continued Care - Discharged on 9/19/2023 Admission date: 9/9/2023 - Discharge disposition: Short Term Hospital (DC - External)      Destination Coordination complete.      Service Provider Selected Services Address Phone Fax Patient Preferred    [facility] [address] [facility] [address] [phone] -- --                          Selected Continued Care - Prior Encounters Includes continued care and service providers with selected services from prior encounters from 6/11/2023 to 9/19/2023      Discharged on 8/31/2023 Admission date: 8/26/2023 - Discharge disposition: Home-Health Care Svc      Home Medical Care       Service Provider Selected Services Address Phone Fax Patient Preferred    [facility] - [facility], [place], operated by [facility] [address] [place] [address] [place] [id number] [phone] --                          Transportation Services  Ambulance: [facility]    Final Discharge Disposition Code: 02 - short term hospital for [facility]

## 2023-10-02 ENCOUNTER — TELEPHONE (OUTPATIENT)
Dept: ONCOLOGY | Facility: CLINIC | Age: 78
End: 2023-10-02
Payer: MEDICARE

## 2023-10-02 NOTE — TELEPHONE ENCOUNTER
Caller: YISEL ARAIZA    Relationship: Emergency Contact    Best call back number: [phone]    What is the best time to reach you: ANY 11AM    Who are you requesting to speak with (clinical staff, provider,  specific staff member): SCHEDULING    Do y    What was the call regarding: PATIENT'S SON CALLED TO R/S MISSED APPTS.      YISEL HAS CLINICAL QUESTIONS ABOUT TREATMENT.      Is it okay if the provider responds through MyChart: NO

## 2023-10-06 ENCOUNTER — TELEPHONE (OUTPATIENT)
Dept: ONCOLOGY | Facility: CLINIC | Age: 78
End: 2023-10-06
Payer: MEDICARE

## 2023-10-06 DIAGNOSIS — K86.89 PANCREATIC MASS: Primary | ICD-10-CM

## 2023-10-06 NOTE — TELEPHONE ENCOUNTER
Reviewed 's recommendations with the patients son, he v/u. A message has been sent to scheduling.     I attempted to contact the patient multiple times, but her cell phones voicemail was full and the home number rang with no answer or voicemail.

## 2023-10-06 NOTE — TELEPHONE ENCOUNTER
----- Message from Kate Mar MD sent at 10/5/2023  2:17 PM EDT -----  It is not clear at this point.  I recommended obtaining a follow-up MRI of the abdomen-MRCP in 2-3 weeks and this will be compared to the MRI from August to reassess the pancreatic mass seen on August MRI.    Thank you    ----- Message -----  From: Alicia Martinez RN  Sent: 10/4/2023   3:20 PM EDT  To: Kate Mar MD    This patients son called me and they are just wanting to know what the next steps are since her ERCP and EUS came back with no results. Is it okay to move her follow up with you to be on the week of October 16? Her son had questions that I was not really able to answer about the pancreatic mass and if it is cancerous or not.     ----- Message -----  From: Barbara Corbett RegSched Rep  Sent: 10/3/2023  12:59 PM EDT  To: Alicia Martinez RN    Spoke with pts son to schedule hosp follow up. Pt was just discharged from [facility]. Patients son is asking for a call back. He has a few questions.     Ari   [phone]    Thank You   Katelyn

## 2023-10-19 ENCOUNTER — TELEPHONE (OUTPATIENT)
Dept: GASTROENTEROLOGY | Facility: CLINIC | Age: 78
End: 2023-10-19
Payer: MEDICARE

## 2023-10-19 NOTE — TELEPHONE ENCOUNTER
Caller: TOBIN ARAIZAD    Relationship: Emergency Contact    Best call back number: [phone]    What is the best time to reach you: ANYTIME    Who are you requesting to speak with (clinical staff, provider,  specific staff member): CLINICAL      What was the call regarding: PATIENT HAD cholecystostomy tube placement DONE IN THE ER ON 09/09/23 AND THE PATIENT'S SON YISEL STATED SHE IS ONLY SUPPOSED TO HAVE THAT FOR 6 WEEKS AND HE'S WANTING TO KNOW WHEN IT'S SUPPOSED TO BE REMOVED. SHE HAS A F/U APPT WITH [facility] ON 12/01/23. HE DIDN'T KNOW IF HIS MOM NEEDED TO BE SEEN SOONER. CALL ASAP

## 2023-11-06 ENCOUNTER — TELEPHONE (OUTPATIENT)
Dept: ONCOLOGY | Facility: CLINIC | Age: 78
End: 2023-11-06

## 2023-11-06 NOTE — TELEPHONE ENCOUNTER
Caller: YISEL ARAIZA    Relationship: Emergency Contact    Best call back number: [phone]    What was the call regarding: YISEL CALLED AND CANCELLED KHURRAM'S APPOINTMENT FOR 11/08. SHE IS CURRENTLY RECOVERING FROM A WHIPPLE PROCEDURE AT [facility]. HE DID NOT WANT TO RESCHEDULE AT THIS TIME.

## 2023-11-27 ENCOUNTER — TELEPHONE (OUTPATIENT)
Dept: ONCOLOGY | Facility: CLINIC | Age: 78
End: 2023-11-27
Payer: MEDICARE

## 2023-11-27 NOTE — TELEPHONE ENCOUNTER
Caller: YISEL ARAIZA    Relationship: Emergency Contact    Best call back number: [phone]      What was the call regarding: PT SON CALLED TO DISCUSS PLAN OF CARE

## 2023-11-27 NOTE — TELEPHONE ENCOUNTER
Called the patients son to let him know that we would need to set the patient up for a f/u visit and patients son v/u.

## 2023-11-29 ENCOUNTER — TELEPHONE (OUTPATIENT)
Dept: ONCOLOGY | Facility: CLINIC | Age: 78
End: 2023-11-29
Payer: MEDICARE

## 2023-11-29 NOTE — TELEPHONE ENCOUNTER
Caller: zak michele    Relationship: Emergency Contact    Best call back number: [phone]     Who are you requesting to speak with (clinical staff, provider,  specific staff member): NON CLINICAL    Do you know the name of the person who called: ALEK JOYCE    What was the call regarding: CALLING TO VERIFY THAT PATIENT CAN SCHEDULE FOR ANYTIME TOMORROW 11/30/23. ATTEMPTED TO WT

## 2023-11-29 NOTE — TELEPHONE ENCOUNTER
Caller: YISEL ARAIZA    Relationship: Emergency Contact    Best call back number: [phone]     What is the best time to reach you: ASAP    Who are you requesting to speak with (clinical staff, provider,  specific staff member): KASSANDRA    What was the call regarding: PT'S SON IS CALLING BACK TO GET PT SCHEDULED FOR TOMORROW, HUB UNABLE TO WARM TRANSFER, PLEASE CALL YISEL BACK ASAP TO SCHEDULE FOR TOMORROW'S OPEN APPT THAT WAS OFFERED TO HIM EARLIER TODAY -NEED CONFIRMATION TODAY. TO MAKE SURE THIS IS SCHEDULED, SO THEY CAN GET TRANSPORTATION FROM NURSING HOME TOMORROW.

## 2023-11-29 NOTE — TELEPHONE ENCOUNTER
Spoke with [facility] patient is inpatient. They will call back after patient gets discharged and schedule appt to see Dr Mar

## 2023-11-30 ENCOUNTER — OFFICE VISIT (OUTPATIENT)
Dept: ONCOLOGY | Facility: CLINIC | Age: 78
End: 2023-11-30
Payer: MEDICARE

## 2023-11-30 ENCOUNTER — LAB (OUTPATIENT)
Dept: LAB | Facility: HOSPITAL | Age: 78
End: 2023-11-30
Payer: MEDICARE

## 2023-11-30 VITALS
SYSTOLIC BLOOD PRESSURE: 130 MMHG | BODY MASS INDEX: 22.04 KG/M2 | OXYGEN SATURATION: 96 % | WEIGHT: 124.4 LBS | RESPIRATION RATE: 16 BRPM | DIASTOLIC BLOOD PRESSURE: 79 MMHG | HEIGHT: 63 IN | TEMPERATURE: 97.8 F | HEART RATE: 102 BPM

## 2023-11-30 DIAGNOSIS — D50.0 IRON DEFICIENCY ANEMIA DUE TO CHRONIC BLOOD LOSS: ICD-10-CM

## 2023-11-30 DIAGNOSIS — R97.8 ELEVATED TUMOR MARKERS: ICD-10-CM

## 2023-11-30 DIAGNOSIS — K86.89 PANCREATIC MASS: ICD-10-CM

## 2023-11-30 DIAGNOSIS — N94.89 ADNEXAL MASS: ICD-10-CM

## 2023-11-30 DIAGNOSIS — C25.0 MALIGNANT NEOPLASM OF HEAD OF PANCREAS: Primary | ICD-10-CM

## 2023-11-30 LAB
ALBUMIN SERPL-MCNC: 3.9 G/DL (ref 3.5–5.2)
ALBUMIN/GLOB SERPL: 0.8 G/DL
ALP SERPL-CCNC: 104 U/L (ref 39–117)
ALT SERPL W P-5'-P-CCNC: 13 U/L (ref 1–33)
ANION GAP SERPL CALCULATED.3IONS-SCNC: 13.7 MMOL/L (ref 5–15)
AST SERPL-CCNC: 28 U/L (ref 1–32)
BASOPHILS # BLD AUTO: 0.05 10*3/MM3 (ref 0–0.2)
BASOPHILS NFR BLD AUTO: 0.5 % (ref 0–1.5)
BILIRUB SERPL-MCNC: 0.4 MG/DL (ref 0–1.2)
BUN SERPL-MCNC: 14 MG/DL (ref 8–23)
BUN/CREAT SERPL: 23.3 (ref 7–25)
CALCIUM SPEC-SCNC: 10 MG/DL (ref 8.6–10.5)
CANCER AG19-9 SERPL-ACNC: 98 U/ML
CHLORIDE SERPL-SCNC: 101 MMOL/L (ref 98–107)
CO2 SERPL-SCNC: 26.3 MMOL/L (ref 22–29)
CREAT SERPL-MCNC: 0.6 MG/DL (ref 0.6–1.1)
DEPRECATED RDW RBC AUTO: 50.5 FL (ref 37–54)
EGFRCR SERPLBLD CKD-EPI 2021: 90.9 ML/MIN/1.73
EOSINOPHIL # BLD AUTO: 0.29 10*3/MM3 (ref 0–0.4)
EOSINOPHIL NFR BLD AUTO: 2.8 % (ref 0.3–6.2)
ERYTHROCYTE [DISTWIDTH] IN BLOOD BY AUTOMATED COUNT: 16.1 % (ref 12.3–15.4)
FERRITIN SERPL-MCNC: 72 NG/ML (ref 13–150)
GLOBULIN UR ELPH-MCNC: 4.7 GM/DL
GLUCOSE SERPL-MCNC: 136 MG/DL (ref 65–99)
HCT VFR BLD AUTO: 30 % (ref 34–46.6)
HGB BLD-MCNC: 9.1 G/DL (ref 12–15.9)
IMM GRANULOCYTES # BLD AUTO: 0.06 10*3/MM3 (ref 0–0.05)
IMM GRANULOCYTES NFR BLD AUTO: 0.6 % (ref 0–0.5)
IRON 24H UR-MRATE: 25 MCG/DL (ref 37–145)
IRON SATN MFR SERPL: 6 % (ref 20–50)
LYMPHOCYTES # BLD AUTO: 1.81 10*3/MM3 (ref 0.7–3.1)
LYMPHOCYTES NFR BLD AUTO: 17.4 % (ref 19.6–45.3)
MCH RBC QN AUTO: 25.8 PG (ref 26.6–33)
MCHC RBC AUTO-ENTMCNC: 30.3 G/DL (ref 31.5–35.7)
MCV RBC AUTO: 85 FL (ref 79–97)
MONOCYTES # BLD AUTO: 0.82 10*3/MM3 (ref 0.1–0.9)
MONOCYTES NFR BLD AUTO: 7.9 % (ref 5–12)
NEUTROPHILS NFR BLD AUTO: 7.39 10*3/MM3 (ref 1.7–7)
NEUTROPHILS NFR BLD AUTO: 70.8 % (ref 42.7–76)
NRBC BLD AUTO-RTO: 0 /100 WBC (ref 0–0.2)
PLATELET # BLD AUTO: 584 10*3/MM3 (ref 140–450)
PMV BLD AUTO: 8.7 FL (ref 6–12)
POTASSIUM SERPL-SCNC: 4.4 MMOL/L (ref 3.5–5.2)
PROT SERPL-MCNC: 8.6 G/DL (ref 6–8.5)
RBC # BLD AUTO: 3.53 10*6/MM3 (ref 3.77–5.28)
SODIUM SERPL-SCNC: 141 MMOL/L (ref 136–145)
TIBC SERPL-MCNC: 421 MCG/DL (ref 298–536)
TRANSFERRIN SERPL-MCNC: 301 MG/DL (ref 200–360)
WBC NRBC COR # BLD AUTO: 10.42 10*3/MM3 (ref 3.4–10.8)

## 2023-11-30 PROCEDURE — 83540 ASSAY OF IRON: CPT | Performed by: INTERNAL MEDICINE

## 2023-11-30 PROCEDURE — 36415 COLL VENOUS BLD VENIPUNCTURE: CPT | Performed by: INTERNAL MEDICINE

## 2023-11-30 PROCEDURE — 85025 COMPLETE CBC W/AUTO DIFF WBC: CPT

## 2023-11-30 PROCEDURE — 86301 IMMUNOASSAY TUMOR CA 19-9: CPT | Performed by: INTERNAL MEDICINE

## 2023-11-30 PROCEDURE — 80053 COMPREHEN METABOLIC PANEL: CPT

## 2023-11-30 PROCEDURE — 82728 ASSAY OF FERRITIN: CPT | Performed by: INTERNAL MEDICINE

## 2023-11-30 PROCEDURE — 84466 ASSAY OF TRANSFERRIN: CPT | Performed by: INTERNAL MEDICINE

## 2023-11-30 NOTE — PROGRESS NOTES
Subjective     CHIEF COMPLAINT:      Chief Complaint   Patient presents with    Follow-up     HISTORY OF PRESENT ILLNESS:     Lizzie Bose is a 78 y.o. female patient who returns today for follow up on pancreatic mass. She had ERCP on 8/29/2023. Cytology revealed scattered atypical ductal and glandular cells, suspicious for a well differentiated neoplasm. EUS could not be done at [facility]. She was transferred to [facility].     Patient underwent Whipple procedure on 10/31/2023 by Dr. Littlejohn. She was discharged but was readmitted with NSTEMI. She was discharged to [facility]. She had the surgical drain in place initially but it was removed afterwards. She continues to have the J tube in place but it is not being used.    Patient reports no abdominal pain today. She is upset that Dr. Littlejohn is not checking on her at the [facility]. She complained about having to work with PT staff.     ROS:  Pertinent ROS is in the HPI.     Past medical, surgical, social and family history were reviewed.     MEDICATIONS:    Current Outpatient Medications:     cetirizine (zyrTEC) 10 MG tablet, Take 1 tablet by mouth Daily., Disp: 30 tablet, Rfl: 0    cholecalciferol (VITAMIN D3) 25 MCG (1000 UT) tablet, Take 1 tablet by mouth Daily., Disp: , Rfl:     hydrOXYzine (ATARAX) 25 MG tablet, Take 1 tablet by mouth 3 (Three) Times a Day As Needed for Itching., Disp: 30 tablet, Rfl: 0    lactulose (CHRONULAC) 10 GM/15ML solution, Take 15 mL by mouth 2 (Two) Times a Day As Needed (constipation)., Disp: 473 mL, Rfl: 0    PRISTIQ 50 MG 24 hr tablet, Take 1 tablet by mouth Daily., Disp: , Rfl:   No current facility-administered medications for this visit.    Facility-Administered Medications Ordered in Other Visits:     Chlorhexidine Gluconate 2 % pads 2 each, 2 pad , Apply externally, BID, Krish Ramirez MD  Objective     VITAL SIGNS:     Vitals:    11/30/23 1317   BP: 130/79   Pulse: 102   Resp: 16   Temp: 97.8 °F (36.6 °C)   TempSrc: Temporal  "  SpO2: 96%   Weight: 56.4 kg (124 lb 6.4 oz)   Height: 159 cm (62.6\")  Comment: new ht   PainSc: 0-No pain     Body mass index is 22.32 kg/m².     Wt Readings from Last 5 Encounters:   11/30/23 56.4 kg (124 lb 6.4 oz)   09/10/23 56.9 kg (125 lb 8 oz)   08/27/23 59 kg (130 lb)   09/15/20 66.2 kg (146 lb)   07/30/20 63.5 kg (140 lb)     PHYSICAL EXAMINATION:   GENERAL: The patient appears weak, not in acute distress.   SKIN: Ecchymosis over the right side of the face.  EYES: No jaundice. Pallor.  CHEST: Normal respiratory effort. Lungs clear. No added sounds.  CVS: Normal S1 and S2. No murmurs.  ABDOMEN: Soft. J tube in place. No Hepatomegaly. No Splenomegaly. No masses.  EXTREMITIES: No edema. No calf tenderness.     DIAGNOSTIC DATA:     Results from last 7 days   Lab Units 11/30/23  1302   WBC 10*3/mm3 10.42   NEUTROS ABS 10*3/mm3 7.39*   HEMOGLOBIN g/dL 9.1*   HEMATOCRIT % 30.0*   PLATELETS 10*3/mm3 584*     Results from last 7 days   Lab Units 11/30/23  1302   SODIUM mmol/L 141   POTASSIUM mmol/L 4.4   CHLORIDE mmol/L 101   CO2 mmol/L 26.3   BUN mg/dL 14   CREATININE mg/dL 0.60   CALCIUM mg/dL 10.0   ALBUMIN g/dL 3.9   BILIRUBIN mg/dL 0.4   ALK PHOS U/L 104   ALT (SGPT) U/L 13   AST (SGOT) U/L 28   GLUCOSE mg/dL 136*         Lab 11/30/23  1302   IRON 25*   IRON SATURATION (TSAT) 6*   TIBC 421   TRANSFERRIN 301   FERRITIN 72.00      Component      Latest Ref Rng 8/28/2023 11/30/2023   CA 19-9      <=35.0 U/mL 2,836.0 (H)  98.0 (H)      Pathology exam from 10/31/2023:      Procedure:  Pancreaticoduodenectomy (Whipple resection), partial   pancreatectomy   Tumor       Tumor Site:  Pancreatic head        Histologic Type:  Ductal adenocarcinoma (NOS)        Histologic Grade:  G2, moderately differentiated        Tumor Size:  2.1 Centimeters (cm)        Site(s) Involved by Direct Tumor Extension:  Peripancreatic soft tissues;   Bile duct        Treatment Effect:  No known presurgical therapy        Lymphovascular " Invasion:  Not identified        Perineural Invasion:  Present   Margins       Margin Status for Invasive Carcinoma:  Invasive carcinoma present at margin        Margin(s) Involved by Invasive Carcinoma:  Uncinate (retroperitoneal /   superior mesenteric artery)        Margin Status for Dysplasia and Intraepithelial Neoplasia:  All margins   negative for dysplasia and intraepithelial neoplasia   Regional Lymph Nodes        Regional Lymph Node Status:         Regional Lymph Node Status:  Tumor present in regional lymph node(s)        Number of Lymph Nodes with Tumor:  6        Number of Lymph Nodes Examined:  24   Distant Metastasis        Distant Site(s) Involved:  Not applicable   Pathologic Stage Classification (pTNM, AJCC 8th Edition)        TNM Descriptors:  Not applicable        pT Category:  pT2        pN Category:  pN2        pM Category:  Not applicable - pM cannot be determined from the submitted   specimen(s)     CT chest abdomen pelvis on 11/8/2023:  1. Post-surgical changes of a Whipple procedure with small volume   abdominopelvic free fluid as well as partial loculated fluid along the   anterior abdominal wall as detailed above.   2. Ill-defined fluid and stranding in the pancreatectomy operative bed   and alondra hepatis, which is likely postoperative and limits evaluation   for underlying tumor. Attention to this region on follow-up is   recommended.   3. Bowel wall thickening at the hepaticojejunostomy and   pancreaticojejunostomy sites, likely post-surgical. No bowel   dilatation identified.   4. Subtle heterogeneous hypoenhancement in the superior pole of the   left kidney, which may reflect focal pyelonephritis or a small   evolving infarct.     CT OF THE HEAD AND CERVICAL SPINE on 11/28/2023:     There is normal cervical vertebral body height and alignment.   Anterolisthesis of C2 on C3. Fusion of C4-C5. Multilevel degenerative   disc disease. No central canal stenosis or foraminal stenosis.      There is no fracture or malalignment.       The atlantoaxial and atlantooccipital articulations are appropriate.      The cervical cord is of normal caliber     No abnormal soft tissue swelling is seen.      The visualized lung apices are clear     IMPRESSION: No CT evidence of acute intracranial abnormality.      Mild chronic small vessel ischemic change.     No evidence of acute injury of the cervical spine.     Pathology exam from 9/22/2023:  Pancreatic mass fine needle aspiration (8 smears and a cell block):   -Mildly atypical ductal cells present.   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  Patient present with biliary obstruction with severe intra and extrahepatic biliary ductal dilatation.  The common bile duct was abruptly decompressed.  MRI abdomen on 8/27/2023 revealed a hypoenhancing pancreatic mass within the uncinate process and pancreatic head resulting in obstruction of the common bile duct and severe upstream intra and extrahepatic biliary ductal dilatation.  S/p ERCP with biliary duct stent placement on 8/29/2023.  Cytology from 8/29/2023 revealed scattered atypical ductal/glandular cells suspicious for well-differentiated neoplasm.  This was considered nondiagnostic of neoplasm.  CT on 9/10/2023 revealed interval placement of the biliary stent with resolution of the biliary duct dilatation. No pancreatic mass was identified.  Patient has cholecystostomy tube placed on 9/12/2023.  Bilirubin decreased since the placement of the jejunostomy tube.  Patient underwent Whipple procedure on 10/31/2023.  Pathology exam revealed the tumor to measure 2.1 cm.  Invasive carcinoma was present at the margin.  There was perineural invasion.  There was involvement of 6 out of 24 lymph nodes examined.  It was pT2 N2.  She is at increased risk for systemic recurrence due to the LN involvement.   With the positive resection margin, she is at increased  risk for local recurrence.  I explained the aggressive nature of pancreatic cancer and that patients with stage III are at high risk for development of metastatic disease, about 80%.   I explained that patients who develop metastatic disease have a limited overall survival of 1 year or less.  I explained that the standard approach for patients with resected stage III pancreatic cancer is adjuvant chemotherapy followed by chemoradiation.  However, the patient is 78 years old and her performance status is decreased.   The patient's friends who accompanied her to today's visit also expressed the concern regarding her mental function with presence of dementia.  They reported that her son is now her guardian.  Based on this, I expressed my concerns regarding tolerance to chemotherapy.  She is unlikely to tolerate the FOLFIRINOX regimen.  I explained that there are other regimens available but with a reduced overall benefit compared to FOLFIRINOX. It is also not clear if she would be able to tolerate.  The expected benefit in reducing risk for recurrence is 10-20%.   Due to the positive surgical margin, I recommended evaluation by Radiation Oncology for consideration for radiation therapy.      *Anemia.  9/9/2023: Hemoglobin 11.9.    9/11/2023: Hemoglobin decreased to 8.6.  9/12/2023: Hemoglobin decreased to 7.5.  S/p 1 unit PRBC transfusion.  Anemia work-up showed no evidence of iron vitamin B12 or folate deficiency.  9/13/2023: Hemoglobin improved to 9.9.  9/14/2023: Hemoglobin decreased to 8.8.  The drop was concerning for GI blood loss.  After the Whipple procedure, she had a drop in her hemoglobin to 7.9 on 11/4/2023 and to 7.4 on 11/10/2023.   She was transfused with PRBCs.  11/30/2023: Hemoglobin 9.1.  Ferritin 72. Transferrin saturation 6%.  Patient reports that she is on oral iron.     *Malnutrition.  Patient has a jejunostomy tube that was placed during the Whipple Surgery.  Patient reports that it is not  currently being used.  She tolerating PO but calorie intake is low.     *Left ovarian/adnexal cyst.  It was first identified on CT on 8/26/2023 measuring 5.7 cm.  Ultrasound on 8/30/2023 showed the lesion to have suspicious features.  There was endometrial thickening.  CT on 9/10/2023 showed the left adnexal lesion to be stable.  CT on 11/8/2023 revealed the left adnexal lesion to measure 5.2 cm.    PLAN:     1.  Refer to Radiation Oncology.  2.  I recommended that she continues to work with PT.  3.  I also recommended increasing calorie intake.  4.  Continue oral iron once daily.  5.  The pathology specimen will be sent for NGS testing.  6.  Follow up in 3 weeks. We will repeat CBC CMP CA 19-9 ferritin and iron panel.      I spent 80 minutes caring for Lizzie on this date of service. This time includes time spent by me in the following activities: preparing for the visit, reviewing tests, obtaining and/or reviewing a separately obtained history, performing a medically appropriate examination and/or evaluation, counseling and educating the patient/family/caregiver, ordering medications, tests, or procedures, referring and communicating with other health care professionals, documenting information in the medical record, and independently interpreting results and communicating that information with the patient/family/caregiver       Kate Mar MD  11/30/23

## 2023-12-05 ENCOUNTER — TELEPHONE (OUTPATIENT)
Dept: RADIATION ONCOLOGY | Facility: HOSPITAL | Age: 78
End: 2023-12-05
Payer: MEDICARE

## 2023-12-05 NOTE — TELEPHONE ENCOUNTER
John with [facility] called to verify appointment for Mrs. Bose.  They will arrive 15-20 prior to appointment to completed New Patient Packet. Per John patient does not have a pacemaker

## 2023-12-06 ENCOUNTER — CONSULT (OUTPATIENT)
Dept: RADIATION ONCOLOGY | Facility: HOSPITAL | Age: 78
End: 2023-12-06
Payer: MEDICARE

## 2023-12-06 VITALS
DIASTOLIC BLOOD PRESSURE: 72 MMHG | SYSTOLIC BLOOD PRESSURE: 116 MMHG | OXYGEN SATURATION: 99 % | BODY MASS INDEX: 22.5 KG/M2 | WEIGHT: 125.4 LBS | HEART RATE: 93 BPM

## 2023-12-06 DIAGNOSIS — C25.0 MALIGNANT NEOPLASM OF HEAD OF PANCREAS: ICD-10-CM

## 2023-12-06 NOTE — PROGRESS NOTES
[facility] Radiation Oncology   Consult    Chief Complaint  Pancreatic adenocarcinoma of the pancreatic head sp Whipple resection on 10/31/2023.  She is not a candidate for any systemic therapy.  Here to discuss adjuvant radiotherapy options.      Diagnosis:    Diagnosis Plan   1. Malignant neoplasm of head of pancreas              Overall Stage    Cancer Staging   Stage III (pT2, pN2, cM0) pancreatic adenocarcinoma of the head of the pancreas        Pacemaker: no  Prior History of Radiation: no  Contraindications to Radiation: no  Patient Requires Pregnancy Test: No, patient is female and >55 years and/or has undergone hysterectomy    HPI:    Lizzie Bose is a 78 y.o. female with history of stage III ([id number]) pancreatic adenocarcinoma of the head of the pancreas sp resection on 10/31/2023 with postive margins and 6 of 24 lymph nodes positive. She is not a candidate for chemotherapy due to health decline post surgery. She is referred to discuss adjuvant radiation therapy to the post-op bed.    8/26/2023 the patient presented to the emergency department with complaints of confusion, tachycardia, and painless jaundice.  Labs in the ED were significant for ALT of 144, AST of 163, Alk Phos of 284,  and Total Bilirubin of 33.2.  CT of the abdomen and pelvis was performed which showed biliary obstruction with severe intra and extrahepatic bile duct dilation with a compressed bile duct 1 settings to the pancreatic head.  Findings are concerning for pancreatic malignancy.  CA 19-9 was 2836.    MRI in the hospital showed a hypoenhancing pancreatic mass centered within the uncinate process of the pancreatic head with resultant obstruction of the common bile duct and severe upstream intra and extrahepatic biliary ductal dilation.    ERCP and stenting with biopsy and brushing was performed in the hospital.  Pathology showed scattered atypical ductal and glandular cells present in the ThinPrep smear suspicious for well  differentiated neoplasm.    CT of the abdomen and pelvis with contrast on 9/10/2023 showed interval placement of the biliary stent with resolution of the biliary duct dilation.  No pancreatic mass was seen on this image.    9/10/2023 - 9/18/2023 the patient was readmitted for sepsis and cholecystitis. This was managed by pancreatic biopsy could not be performed during her hospitalization.  She was transferred to [facility] for further management.    9/11/2023 the patient met with Dr. Mar in medical oncology.  Additional tissue biopsy was recommended.    9/25/2023 pancreatic mass fine-needle aspiration was performed.  Mildly atypical ductal cells were present this again was nondiagnostic.    10/18/2023 the patient met with Dr. Littlejohn at [facility] to discuss. Per Dr. Littlejohn note, patient CA 19-9 was still elevated at 860.  They discussed options of repeat biopsy versus proceeding directly to surgery.  The decision was made to proceed with Whipple surgery.    10/31/2023 the patient had a Whipple procedure performed.  Pathology showed ductal adenocarcinoma of the pancreatic head, grade 2, moderately differentiated.  The tumor measured 2.1 cm in greatest diameter.  The peripancreatic soft tissues and bile duct were involved with direct tumor extension.  Perineural invasion was present.  The uncinate margin was involved with invasive carcinoma.  6 of 24 lymph nodes were involved with tumor.  Final staging was stage III, [id number], pancreatic adenocarcinoma with positive margins postresection.    11/22/2023 the patient met with Dr. Littlejohn.  Her incision is well-healed with no significant drainage.  The surgical drain was removed.    11/30/2023 the patient met with Dr. Mar again in follow-up.  They discussed typical adjuvant treatment options for stage III pancreatic adenocarcinoma with positive margins and multiple positive lymph nodes.  They discussed the patient was not a good candidate for chemotherapy and  likely would not tolerate any chemotherapy at this time.  Given her positive margins and lymphadenopathy she was referred for discussion of adjuvant radiation.  She would not be a candidate for concurrent chemoradiation.    Today the patient reports she has recovered well from surgery. She denies issues with nausea. She is interested in learning about potential treatment options.     Imaging:      CT CHEST, CT ABDOMEN AND CT PELVIS W IV CON   11/08/2023 11:13 AM     HISTORY: Sepsis.     COMPARISON: None available.     TECHNIQUE: CT images of the chest, abdomen, and pelvis were obtained   after the administration of IV contrast. Radiation dose reduction   techniques were utilized per ALARA protocol.     CHEST FINDINGS:   There are small bilateral pleural effusions with dependent atelectasis   in the lower lobes. There is diffuse septal thickening most pronounced   in the upper lobes.     No pericardial effusion is identified. There are scattered coronary   arterial atherosclerotic calcifications. There are prominent   mediastinal lymph nodes, which are nonspecific and likely reactive.   The central airways are patent.     There are foci of gas in the right posterior cardiophrenic region,   which are external to the distal esophagus.     No acute osseous abnormality is identified in the visualized skeleton.     IMPRESSION:   1. Pulmonary edema with small bilateral pleural effusions and   dependent atelectasis in the lower lobes.   2. Foci of gas in the right posterior cardiophrenic region, which may   be extraluminal and related to recent surgery or perhaps located   within a small paraesophageal hernia. Dedicated fluoroscopic   evaluation or further evaluation with CT esophagram can be performed   to exclude perforation as clinically directed.       ABDOMEN/PELVIS FINDINGS:   There are post-surgical changes of a Whipple procedure. The remnant   pancreatic body and tail are normal in appearance. There is    small-volume abdominal pelvic free fluid. Ill-defined fluid and   stranding is noted throughout the pancreatectomy bed and alondra   hepatis, limiting evaluation for underlying tumor. A right lower   quadrant approach surgical drain extends to the left hemiabdomen. No   fluid collection is identified in this region. There is partially   loculated fluid along the anterior abdominal wall measuring 1.6 x 7.7   cm in transaxial dimension.  No walled-off fluid collection is   identified.     There is diffuse periportal edema. There is a small, ill-defined   hypoattenuating region and hepatic segment 4, which is incompletely   characterized on this nondedicated examination. The spleen and adrenal   glands are unremarkable.     There are subcentimeter renal cysts bilaterally. There is subtle   heterogeneous hypoenhancement in the superior pole the left kidney. No   nephrolithiasis or hydronephrosis is evident.     A jejunostomy tube extends to the region of the anastomosis. There is   bowel wall thickening at the hepaticojejunostomy and   pancreaticojejunostomy sites, likely postoperative. No bowel   dilatation is identified.     The abdominal aorta is normal in caliber with scattered   atherosclerotic calcifications. There is a left adnexal cystic lesion   measuring 5.2 cm.     The bladder is partially distended. There is pelvic floor laxity.     There are surgical changes of a left total hip arthroplasty with   associated streak artifact limiting evaluation of the surrounding   structures. No acute osseous abnormality is identified in the   visualized skeleton given this limitation.     IMPRESSION:   1. Post-surgical changes of a Whipple procedure with small volume   abdominopelvic free fluid as well as partial loculated fluid along the   anterior abdominal wall as detailed above.   2. Ill-defined fluid and stranding in the pancreatectomy operative bed   and alondra hepatis, which is likely postoperative and limits  evaluation   for underlying tumor. Attention to this region on follow-up is   recommended.   3. Bowel wall thickening at the hepaticojejunostomy and   pancreaticojejunostomy sites, likely post-surgical. No bowel   dilatation identified.   4. Subtle heterogeneous hypoenhancement in the superior pole of the   left kidney, which may reflect focal pyelonephritis or a small   evolving infarct.       CT Guided Abscess Drain Peritoneal    Result Date: 9/12/2023  Successful cholecystostomy tube placement as described above. Daily flushing (including at home) necessary. Tract maturation requires at least 6 weeks. Cystic duct patency to be assessed prior to potential removal.   This report was finalized on 9/12/2023 6:53 PM by Dr. Judson Stephens M.D.      XR Abdomen 2+ VW with Chest 1 VW    Result Date: 9/12/2023  No acute abnormality.  This report was finalized on 9/12/2023 3:55 PM by Dr. Joel Brewster M.D.      US Pelvis Transvaginal Non OB    Result Date: 8/30/2023  1. Left adnexal cyst with suspicious features. Consider MRI if clinically indicated  2. Endometrial thickening. Differential considerations include tamoxifen therapy, endometrial hyperplasia, and less likely endometrial cancer. Recommend attention on MRI with biopsy if clinically indicated.  This report was finalized on 8/30/2023 4:23 PM by Dr. Aneesh Eisenberg M.D.      FL ercp biliary duct only    Result Date: 8/30/2023   As described.  This report was finalized on 8/30/2023 8:37 AM by Dr. Seth Han M.D.      CT Chest With Contrast Diagnostic    Result Date: 8/29/2023  1. Primary pancreatic neoplasm within the posterior pancreatic head is better demonstrated on the most recent MRI. This is causing common bile duct obstruction. The gallbladder is distended with wall thickening and enhancement. There is no definite evidence of vascular invasion on CT. Sub-5 mm pulmonary nodules that are indeterminate and attention on followup is recommended. 2. Moderate  to large sized hiatal hernia. 3. There is suggestion of endometrial thickening as well as a cystic lesion within the left adnexa measuring up to 5.8 cm. Followup with pelvic sonogram is recommended.  Radiation dose reduction techniques were utilized, including automated exposure control and exposure modulation based on body size.   This report was finalized on 8/29/2023 1:14 PM by Dr. Walter Linda M.D.      CT Abdomen Pelvis With Contrast    Result Date: 8/29/2023  1. Primary pancreatic neoplasm within the posterior pancreatic head is better demonstrated on the most recent MRI. This is causing common bile duct obstruction. The gallbladder is distended with wall thickening and enhancement. There is no definite evidence of vascular invasion on CT. Sub-5 mm pulmonary nodules that are indeterminate and attention on followup is recommended. 2. Moderate to large sized hiatal hernia. 3. There is suggestion of endometrial thickening as well as a cystic lesion within the left adnexa measuring up to 5.8 cm. Followup with pelvic sonogram is recommended.  Radiation dose reduction techniques were utilized, including automated exposure control and exposure modulation based on body size.   This report was finalized on 8/29/2023 1:14 PM by Dr. Walter Linda M.D.      CT Head Without Contrast    Result Date: 8/27/2023  Electronically signed by Andrea Lozano MD on 08-27-23 at 1809    CT Abdomen Pelvis Without Contrast    Result Date: 8/26/2023  1.  Findings of biliary obstruction with severe intra and extrahepatic bili ductal dilation. The common bile duct becomes abruptly decompressed as it projects through the pancreatic head. Gallbladder is also distended. While findings are incompletely characterized, they are most concerning for underlying pancreatic malignancy and further evaluation with MRCP versus ERCP is recommended to further characterize. 2.  A few nodular areas of pulmonary opacification of the bilateral lung  bases which given the above findings are nonspecific. At least continued close interval follow-up is recommended. 3.  Left adnexal cystic lesion measuring up to 5.7 cm, incompletely evaluated. Further evaluation with pelvic sonogram is recommended to exclude neoplasm. 4.  Other findings as above.   This report was finalized on 8/26/2023 11:19 PM by Dr. Pierre Garcia M.D.         Pathology:    Path Report from Surgery on 10/31/2023  Synoptic Report:  Specimen      Procedure:  Pancreaticoduodenectomy (Whipple resection), partial  pancreatectomy  Tumor      Tumor Site:  Pancreatic head      Histologic Type:  Ductal adenocarcinoma (NOS)      Histologic Grade:  G2, moderately differentiated      Tumor Size:  2.1 Centimeters (cm)      Site(s) Involved by Direct Tumor Extension:  Peripancreatic soft tissues;  Bile duct      Treatment Effect:  No known presurgical therapy      Lymphovascular Invasion:  Not identified      Perineural Invasion:  Present  Margins      Margin Status for Invasive Carcinoma:  Invasive carcinoma present at margin      Margin(s) Involved by Invasive Carcinoma:  Uncinate (retroperitoneal /  superior mesenteric artery)      Margin Status for Dysplasia and Intraepithelial Neoplasia:  All margins  negative for dysplasia and intraepithelial neoplasia  Regional Lymph Nodes      Regional Lymph Node Status:        Regional Lymph Node Status:  Tumor present in regional lymph node(s)      Number of Lymph Nodes with Tumor:  6      Number of Lymph Nodes Examined:  24  Distant Metastasis      Distant Site(s) Involved:  Not applicable  Pathologic Stage Classification (pTNM, AJCC 8th Edition)      TNM Descriptors:  Not applicable      pT Category:  pT2      pN Category:  pN2      pM Category:  Not applicable - pM cannot be determined from the submitted  specimen(s)  Best Tumor Blocks for Future Studies      Tumor Block(s):  D17      Normal Block(s):  D7     Labs:    Lab Results   Component Value Date     CREATININE 0.60 11/30/2023                 Problem List:  Patient Active Problem List   Diagnosis    PAT (obstructive sleep apnea)    Lumbar pain    Primary osteoarthritis of left knee    Primary osteoarthritis of right knee    Benign essential hypertension    Mixed hyperlipidemia    Vitamin D deficiency    Age-related osteoporosis without current pathological fracture    Hyperinsulinism    Chronic insomnia    Attention deficit hyperactivity disorder (ADHD), combined type    Dyspnea on exertion    Dyslipidemia    Biliary obstruction    Elevated liver enzymes    Malignant neoplasm of head of pancreas    Severe sepsis          Medications:  Current Outpatient Medications on File Prior to Visit   Medication Sig Dispense Refill    cetirizine (zyrTEC) 10 MG tablet Take 1 tablet by mouth Daily. 30 tablet 0    cholecalciferol (VITAMIN D3) 25 MCG (1000 UT) tablet Take 1 tablet by mouth Daily.      hydrOXYzine (ATARAX) 25 MG tablet Take 1 tablet by mouth 3 (Three) Times a Day As Needed for Itching. 30 tablet 0    lactulose (CHRONULAC) 10 GM/15ML solution Take 15 mL by mouth 2 (Two) Times a Day As Needed (constipation). 473 mL 0    PRISTIQ 50 MG 24 hr tablet Take 1 tablet by mouth Daily.       Current Facility-Administered Medications on File Prior to Visit   Medication Dose Route Frequency Provider Last Rate Last Admin    Chlorhexidine Gluconate 2 % pads 2 each  2 pad  Apply externally BID Krish Ramirez MD              Allergies:  Allergies   Allergen Reactions    Influenza Virus Vaccine Unknown - Low Severity     Other Reaction(s): Other (See Comments)      Muscle pain    Atorvastatin          Family History:  Family History   Problem Relation Age of Onset    Diabetes Brother     Hypertension Brother     Diabetes Brother     Clotting disorder Son     Malig Hyperthermia Neg Hx            Social History:  Social History     Socioeconomic History    Marital status:    Tobacco Use    Smoking status: Never    Smokeless  "tobacco: Never   Vaping Use    Vaping Use: Never used   Substance and Sexual Activity    Alcohol use: No    Drug use: Never    Sexual activity: Defer         Distance From Clinic: <30 minutes    Patient has someone who can assist with transportation: yes      Review of Systems:    Review of Systems   HENT:  Positive for tinnitus.          Vital Signs:  There were no vitals taken for this visit.  Estimated body mass index is 22.32 kg/m² as calculated from the following:    Height as of 11/30/23: 159 cm (62.6\").    Weight as of 11/30/23: 56.4 kg (124 lb 6.4 oz).  There were no vitals filed for this visit.      ECOG: Ambulatory and capable of all selfcare but unable to carry out any work activities; up and about more than 50% of waking hours = 2    Physical Exam  Constitutional:       General: She is not in acute distress.  HENT:      Head: Normocephalic and atraumatic.   Pulmonary:      Effort: Pulmonary effort is normal. No respiratory distress.   Neurological:      Mental Status: She is alert and oriented to person, place, and time. Mental status is at baseline.   Psychiatric:         Mood and Affect: Mood normal.         Behavior: Behavior normal.            Result Review :             There are no diagnoses linked to this encounter.    Assessment:    Lizzie Bose is a 78 y.o. female with history of stage III (mE0T3T2) pancreatic adenocarcinoma of the head of the pancreas sp resection on 10/31/2023 with postive margins and 6 of 24 lymph nodes positive. She is not a candidate for chemotherapy due to health decline. She is referred to discuss adjuvant radiation therapy to the post-op bed.    Plan:    Orders:   - Discussed pros and cons of treatment. The patient elected against treatment at this time.  - No additional follow-up will be scheduled at this time. We are happy to see the patient back if she has questions, concerns, or new needs for radiation.     We discussed the patients natural history of her disease.  " We discussed her work-up and treatment received to date.  We discussed her recent conversation with Dr. Mar.  We discussed how she is not a candidate for adjuvant systemic therapy at this time.  We discussed the potential role of adjuvant radiation therapy given her positive margin and multiple lymph nodes.  We also discussed the high risk of distant recurrence with prostate cancer.  I think adjuvant radiation could be considered for her situation, however risks and quality of life decisions also come into play given her age and life situation.  We discussed a definitive course of adjuvant therapy would consist of 5 weeks of daily radiation therapy.  We discussed potential acute and late side effects of treatment.  We discussed how radiation therapy is planned and delivered.  We also discussed consideration of palliative radiation delivered at a future date if she develops symptoms from local recurrence or metastatic disease.  At this time the patient prefers no aggressive therapy at this time but may consider future palliative radiation if indicated.  The patient has follow-up with Dr. Mar to monitor her disease.  We will not schedule follow-up at this time.  We are happy to see the patient back if she has any questions, concerns, or new needs for radiation therapy.       I spent 60 minutes caring for Lizzie on this date of service. This time includes time spent by me in the following activities:preparing for the visit, reviewing tests, obtaining and/or reviewing a separately obtained history, performing a medically appropriate examination and/or evaluation , counseling and educating the patient/family/caregiver, referring and communicating with other health care professionals , documenting information in the medical record, and independently interpreting results and communicating that information with the patient/family/caregiver  Follow Up   No follow-ups on file.  Patient was given instructions and  counseling regarding her condition or for health maintenance advice. Please see specific information pulled into the AVS if appropriate.     Valdo Black MD

## 2023-12-26 ENCOUNTER — TELEPHONE (OUTPATIENT)
Dept: ONCOLOGY | Facility: CLINIC | Age: 78
End: 2023-12-26
Payer: MEDICARE

## 2023-12-26 NOTE — TELEPHONE ENCOUNTER
Caller: YISEL ARAIZA    Relationship to patient: Emergency Contact    Best call back number: [phone]    Patient is needing: TO R/S 1-18-24 LAB AND F/U APPT TO 1-10-24 OR 1-11-24 OR 1-15-24. IF R/S IS NOT POSSIBLE FOR THESE DAYS THEY WILL KEEP THE 1-18-24 DATE.

## 2024-01-16 ENCOUNTER — TELEPHONE (OUTPATIENT)
Dept: ONCOLOGY | Facility: CLINIC | Age: 79
End: 2024-01-16
Payer: MEDICARE

## 2024-01-16 NOTE — TELEPHONE ENCOUNTER
Caller: KAUSHIK ARAIZA    Relationship: Emergency Contact    Best call back number: [phone]      What was the call regarding: FAMILY WOULD LIKE TO SPEAK TO THE NURSE REGARDING PATIENTS PLAN OF CARE

## 2024-01-16 NOTE — TELEPHONE ENCOUNTER
Called Serina to see what questions she had. She states that the patients son Ari is POA now and that the patient most likely is going to refuse to come in Thursday but they would like to know prognosis and she wanted to make sure they would be able to talk with Dr. Mar over the phone for a short discussion over this. I let her know we would wait to see if the patient was agreeable but that we would answer their questions and she v/u.

## 2024-01-16 NOTE — TELEPHONE ENCOUNTER
Left voicemail with my direct line, [phone], to speak with patient regarding new [facility] HMO insurance

## 2024-01-17 ENCOUNTER — TELEPHONE (OUTPATIENT)
Dept: ONCOLOGY | Facility: CLINIC | Age: 79
End: 2024-01-17
Payer: MEDICARE

## 2024-01-17 DIAGNOSIS — R97.8 ELEVATED TUMOR MARKERS: ICD-10-CM

## 2024-01-17 DIAGNOSIS — C25.0 MALIGNANT NEOPLASM OF HEAD OF PANCREAS: Primary | ICD-10-CM

## 2024-01-17 NOTE — TELEPHONE ENCOUNTER
"Spoke with patient today regarding her new 2024 [facility] MCR HMO plan. Patient was scheduled for an appointment with Dr. Mar on 1/18/24. Called to give patient information/options to be able to continue to see Dr. Mar. Patient voiced frustration with [employer] and [facility] being out of network with her insurance. I explained to her that in order to continue to see Dr. Mar as a provider she could call [facility] and see if it was possible to switch to a [facility] PPO plan. She could also change insurances all together to a plan [employer] participates with or she could go with C of C through [facility].  Patient stated \"you did this, you fix this\". I explained to her that this is something I am unable to do for her, and that she would need to reach out to [facility].  Patient was still frustrated with my answers, and decided she wants to be referred to another provider/facility that takes her insurance.    Message sent to clinic nurse to place referral and start transfer to [facility]. Patient to be transferred to an in-network provider per patient's request.  "

## 2024-01-18 ENCOUNTER — TELEPHONE (OUTPATIENT)
Dept: ONCOLOGY | Facility: CLINIC | Age: 79
End: 2024-01-18

## 2024-01-18 NOTE — TELEPHONE ENCOUNTER
Hub to read:    I have tried to reach out to Ari and had to leave a voicemail. Patient's appointment was cancelled, because her plan is out of network and she is being transferred to [facility]. If he calls back, please route call to me at [phone].

## 2024-01-18 NOTE — TELEPHONE ENCOUNTER
Referral to [facility] cancelled.    Spoke with patient's son Ari today. Per Ari, patient is already in the process of changing insurance to [employer]. Left him my direct line, [phone], to call me when they have an ID # and I will have patient added back to Dr. Mar's schedule. Son v/u, and stated they are going to also fill out a C of C with [facility] MCR to see if he can get patient added back on to the schedule sooner.

## 2024-01-18 NOTE — TELEPHONE ENCOUNTER
This is Ari Bose. I'm Joyce Chaddock, Wagner's legal guardian. Somebody called and spoke to Lizzie today. I believe it was Ashia, and my mother had canceled the appointment as her guardian. I'm not authorizing that cancellation. She's going to, we're gonna do our best to make her attend the appointment. We have plans for her to to go, but she doesn't have that legal right anymore. And if you could reach me at [phone], you know, we really need to understand the prognosis of her cancer. Thank you.  01/17/2024 08:11 PM  This was left on [facility]

## 2024-01-18 NOTE — TELEPHONE ENCOUNTER
Referral to [facility] cancelled.    Spoke with patient's son Ari today. Per Ari, patient is already in the process of changing insurance to [place]. Left him my direct line, [phone], to call me when they have an ID # and I will have patient added back to Dr. Mar's schedule. Son v/u, and stated they are going to also fill out a C of C with [facility] to see if he can get patient added back on to the schedule sooner.

## 2024-03-25 ENCOUNTER — OFFICE VISIT (OUTPATIENT)
Dept: ONCOLOGY | Facility: CLINIC | Age: 79
End: 2024-03-25
Payer: MEDICARE

## 2024-03-25 ENCOUNTER — LAB (OUTPATIENT)
Dept: LAB | Facility: HOSPITAL | Age: 79
End: 2024-03-25
Payer: MEDICARE

## 2024-03-25 VITALS
HEART RATE: 109 BPM | TEMPERATURE: 97.2 F | WEIGHT: 129.6 LBS | BODY MASS INDEX: 22.96 KG/M2 | HEIGHT: 63 IN | SYSTOLIC BLOOD PRESSURE: 138 MMHG | OXYGEN SATURATION: 97 % | RESPIRATION RATE: 16 BRPM | DIASTOLIC BLOOD PRESSURE: 74 MMHG

## 2024-03-25 DIAGNOSIS — R97.8 ELEVATED TUMOR MARKERS: ICD-10-CM

## 2024-03-25 DIAGNOSIS — C80.1 ANXIETY ASSOCIATED WITH CANCER DIAGNOSIS: ICD-10-CM

## 2024-03-25 DIAGNOSIS — C25.0 MALIGNANT NEOPLASM OF HEAD OF PANCREAS: Primary | ICD-10-CM

## 2024-03-25 DIAGNOSIS — C25.0 MALIGNANT NEOPLASM OF HEAD OF PANCREAS: ICD-10-CM

## 2024-03-25 DIAGNOSIS — F41.1 ANXIETY ASSOCIATED WITH CANCER DIAGNOSIS: ICD-10-CM

## 2024-03-25 DIAGNOSIS — D50.0 IRON DEFICIENCY ANEMIA DUE TO CHRONIC BLOOD LOSS: ICD-10-CM

## 2024-03-25 DIAGNOSIS — N94.89 ADNEXAL MASS: ICD-10-CM

## 2024-03-25 LAB
ALBUMIN SERPL-MCNC: 3.9 G/DL (ref 3.5–5.2)
ALBUMIN/GLOB SERPL: 0.9 G/DL
ALP SERPL-CCNC: 188 U/L (ref 39–117)
ALT SERPL W P-5'-P-CCNC: 23 U/L (ref 1–33)
ANION GAP SERPL CALCULATED.3IONS-SCNC: 16.9 MMOL/L (ref 5–15)
AST SERPL-CCNC: 23 U/L (ref 1–32)
BASOPHILS # BLD AUTO: 0.03 10*3/MM3 (ref 0–0.2)
BASOPHILS NFR BLD AUTO: 0.3 % (ref 0–1.5)
BILIRUB SERPL-MCNC: 0.3 MG/DL (ref 0–1.2)
BUN SERPL-MCNC: 12 MG/DL (ref 8–23)
BUN/CREAT SERPL: 20.7 (ref 7–25)
CALCIUM SPEC-SCNC: 9.3 MG/DL (ref 8.6–10.5)
CANCER AG19-9 SERPL-ACNC: 237 U/ML
CHLORIDE SERPL-SCNC: 98 MMOL/L (ref 98–107)
CO2 SERPL-SCNC: 21.1 MMOL/L (ref 22–29)
CREAT SERPL-MCNC: 0.58 MG/DL (ref 0.57–1)
DEPRECATED RDW RBC AUTO: 45.9 FL (ref 37–54)
EGFRCR SERPLBLD CKD-EPI 2021: 91.6 ML/MIN/1.73
EOSINOPHIL # BLD AUTO: 0.27 10*3/MM3 (ref 0–0.4)
EOSINOPHIL NFR BLD AUTO: 2.5 % (ref 0.3–6.2)
ERYTHROCYTE [DISTWIDTH] IN BLOOD BY AUTOMATED COUNT: 18.9 % (ref 12.3–15.4)
FERRITIN SERPL-MCNC: 30.6 NG/ML (ref 13–150)
GLOBULIN UR ELPH-MCNC: 4.2 GM/DL
GLUCOSE SERPL-MCNC: 146 MG/DL (ref 65–99)
HCT VFR BLD AUTO: 31.6 % (ref 34–46.6)
HGB BLD-MCNC: 8.8 G/DL (ref 12–15.9)
IMM GRANULOCYTES # BLD AUTO: 0.06 10*3/MM3 (ref 0–0.05)
IMM GRANULOCYTES NFR BLD AUTO: 0.6 % (ref 0–0.5)
IRON 24H UR-MRATE: 21 MCG/DL (ref 37–145)
IRON SATN MFR SERPL: 4 % (ref 20–50)
LYMPHOCYTES # BLD AUTO: 2.43 10*3/MM3 (ref 0.7–3.1)
LYMPHOCYTES NFR BLD AUTO: 22.6 % (ref 19.6–45.3)
MCH RBC QN AUTO: 19.5 PG (ref 26.6–33)
MCHC RBC AUTO-ENTMCNC: 27.8 G/DL (ref 31.5–35.7)
MCV RBC AUTO: 69.9 FL (ref 79–97)
MONOCYTES # BLD AUTO: 0.73 10*3/MM3 (ref 0.1–0.9)
MONOCYTES NFR BLD AUTO: 6.8 % (ref 5–12)
NEUTROPHILS NFR BLD AUTO: 67.2 % (ref 42.7–76)
NEUTROPHILS NFR BLD AUTO: 7.22 10*3/MM3 (ref 1.7–7)
NRBC BLD AUTO-RTO: 0 /100 WBC (ref 0–0.2)
PLATELET # BLD AUTO: 646 10*3/MM3 (ref 140–450)
PMV BLD AUTO: 9.4 FL (ref 6–12)
POTASSIUM SERPL-SCNC: 3.5 MMOL/L (ref 3.5–5.2)
PROT SERPL-MCNC: 8.1 G/DL (ref 6–8.5)
RBC # BLD AUTO: 4.52 10*6/MM3 (ref 3.77–5.28)
SODIUM SERPL-SCNC: 136 MMOL/L (ref 136–145)
TIBC SERPL-MCNC: 560 MCG/DL (ref 298–536)
TRANSFERRIN SERPL-MCNC: 376 MG/DL (ref 200–360)
WBC NRBC COR # BLD AUTO: 10.74 10*3/MM3 (ref 3.4–10.8)

## 2024-03-25 PROCEDURE — 36415 COLL VENOUS BLD VENIPUNCTURE: CPT

## 2024-03-25 PROCEDURE — 1159F MED LIST DOCD IN RCRD: CPT | Performed by: INTERNAL MEDICINE

## 2024-03-25 PROCEDURE — 86301 IMMUNOASSAY TUMOR CA 19-9: CPT | Performed by: INTERNAL MEDICINE

## 2024-03-25 PROCEDURE — 3078F DIAST BP <80 MM HG: CPT | Performed by: INTERNAL MEDICINE

## 2024-03-25 PROCEDURE — 82728 ASSAY OF FERRITIN: CPT

## 2024-03-25 PROCEDURE — 83540 ASSAY OF IRON: CPT

## 2024-03-25 PROCEDURE — 3075F SYST BP GE 130 - 139MM HG: CPT | Performed by: INTERNAL MEDICINE

## 2024-03-25 PROCEDURE — 1126F AMNT PAIN NOTED NONE PRSNT: CPT | Performed by: INTERNAL MEDICINE

## 2024-03-25 PROCEDURE — 80053 COMPREHEN METABOLIC PANEL: CPT

## 2024-03-25 PROCEDURE — 85025 COMPLETE CBC W/AUTO DIFF WBC: CPT

## 2024-03-25 PROCEDURE — 1160F RVW MEDS BY RX/DR IN RCRD: CPT | Performed by: INTERNAL MEDICINE

## 2024-03-25 PROCEDURE — 84466 ASSAY OF TRANSFERRIN: CPT

## 2024-03-25 PROCEDURE — 99215 OFFICE O/P EST HI 40 MIN: CPT | Performed by: INTERNAL MEDICINE

## 2024-03-25 RX ORDER — FERROUS SULFATE 325(65) MG
325 TABLET ORAL DAILY
Start: 2024-03-25

## 2024-03-25 NOTE — PROGRESS NOTES
"Subjective     CHIEF COMPLAINT:      Chief Complaint   Patient presents with    Follow-up     Discuss next generation testing and possible upcoming scans      HISTORY OF PRESENT ILLNESS:     Lizzie Bose is a 78 y.o. female patient who returns today for follow up on her pancreatic cancer.  She returns today for follow-up reporting anxiety related to the cancer and the follow-up visit today.  Blood pressure was elevated as a result.  She is being followed by hospice.  Our staff checked on the list of active medicines and was not able to obtain the names of all the medicines that she is currently taking.    ROS:  Pertinent ROS is in the HPI.     Past medical, surgical, social and family history were reviewed.     MEDICATIONS:    Current Outpatient Medications:     cetirizine (zyrTEC) 10 MG tablet, Take 1 tablet by mouth Daily., Disp: 30 tablet, Rfl: 0    cholecalciferol (VITAMIN D3) 25 MCG (1000 UT) tablet, Take 1 tablet by mouth Daily., Disp: , Rfl:     hydrOXYzine (ATARAX) 25 MG tablet, Take 1 tablet by mouth 3 (Three) Times a Day As Needed for Itching., Disp: 30 tablet, Rfl: 0    lactulose (CHRONULAC) 10 GM/15ML solution, Take 15 mL by mouth 2 (Two) Times a Day As Needed (constipation)., Disp: 473 mL, Rfl: 0    PRISTIQ 50 MG 24 hr tablet, Take 1 tablet by mouth Daily., Disp: , Rfl:   No current facility-administered medications for this visit.    Facility-Administered Medications Ordered in Other Visits:     Chlorhexidine Gluconate 2 % pads 2 each, 2 pad , Apply externally, BID, Krish Ramirez MD    Objective     VITAL SIGNS:     Vitals:    03/25/24 1616   BP: 138/74   Pulse: 109   Resp: 16   Temp: 97.2 °F (36.2 °C)   TempSrc: Temporal   SpO2: 97%   Weight: 58.8 kg (129 lb 9.6 oz)   Height: 159 cm (62.6\")   PainSc: 0-No pain     Body mass index is 23.25 kg/m².     Wt Readings from Last 5 Encounters:   03/25/24 58.8 kg (129 lb 9.6 oz)   12/06/23 56.9 kg (125 lb 6.4 oz)   11/30/23 56.4 kg (124 lb 6.4 oz)   09/10/23 " 56.9 kg (125 lb 8 oz)   08/27/23 59 kg (130 lb)     PHYSICAL EXAMINATION:   GENERAL: The patient appears in good general condition, not in acute distress.   SKIN: No ecchymosis.  EYES: No jaundice. No Pallor.  CHEST: Normal respiratory effort.  Lungs clear bilaterally.  No added sounds.  CVS: No edema.  ABDOMEN: Incision is well-healed.  Soft. No tenderness. No Hepatomegaly. No Splenomegaly. No masses.  EXTREMITIES: No joint deformity.     DIAGNOSTIC DATA:     Results from last 7 days   Lab Units 03/25/24  1601   WBC 10*3/mm3 10.74   NEUTROS ABS 10*3/mm3 7.22*   HEMOGLOBIN g/dL 8.8*   HEMATOCRIT % 31.6*   PLATELETS 10*3/mm3 646*     Results from last 7 days   Lab Units 03/25/24  1601   SODIUM mmol/L 136   POTASSIUM mmol/L 3.5   CHLORIDE mmol/L 98   CO2 mmol/L 21.1*   BUN mg/dL 12   CREATININE mg/dL 0.58   CALCIUM mg/dL 9.3   ALBUMIN g/dL 3.9   BILIRUBIN mg/dL 0.3   ALK PHOS U/L 188*   ALT (SGPT) U/L 23   AST (SGOT) U/L 23   GLUCOSE mg/dL 146*         Lab 03/25/24  1601   IRON 21*   IRON SATURATION (TSAT) 4*   TIBC 560*   TRANSFERRIN 376*   FERRITIN 30.60      Component      Latest Ref Rng 8/28/2023 11/30/2023 3/25/2024   CA 19-9      <=35.0 U/mL 2,836.0 (H)  98.0 (H)  237.0 (H)       Assessment & Plan    *Pancreatic adenocarcinoma arising from the pancreatic head.  Patient present with biliary obstruction with severe intra and extrahepatic biliary ductal dilatation.  The common bile duct was abruptly decompressed.  MRI abdomen on 8/27/2023 revealed a hypoenhancing pancreatic mass within the uncinate process and pancreatic head resulting in obstruction of the common bile duct and severe upstream intra and extrahepatic biliary ductal dilatation.  S/p ERCP with biliary duct stent placement on 8/29/2023.  Cytology from 8/29/2023 revealed scattered atypical ductal/glandular cells suspicious for well-differentiated neoplasm.  This was considered nondiagnostic of neoplasm.  CT on 9/10/2023 revealed interval placement of  the biliary stent with resolution of the biliary duct dilatation. No pancreatic mass was identified.  Patient has cholecystostomy tube placed on 9/12/2023.  Patient underwent Whipple procedure on 10/31/2023.  Pathology exam revealed the tumor to measure 2.1 cm.  Invasive carcinoma was present at the margin.  There was perineural invasion.  There was involvement of 6 out of 24 lymph nodes examined.  It was pT2 N2, stage III.  She is at increased risk for systemic recurrence due to the LN involvement.   With the positive resection margin, she is at increased risk for local recurrence.  The risk of recurrence of the pancreatic cancer was estimated at 80% in patients with stage III disease.  We discussed the options of adjuvant chemotherapy.  FOLFIRINOX carries high risk for complications as was not recommended.  She is unlikely to tolerate Gemzar + Abraxane.  Although Gemzar as a single agent is an option for adjuvant chemotherapy, the benefit is limited to reducing the risk by 10-20%.  Due to her age and decreased performance status, adjuvant chemotherapy was not recommended.  11/30/2023: CA 19-9 was 98.0.  [facility] revealed no actionable mutation.  Patient enrolled with hospice after the last visit.  3/25/2024: CA 19-9 increased to 237.  This is concerning for disease recurrence.     *Anemia.  9/9/2023: Hemoglobin 11.9.    9/11/2023: Hemoglobin decreased to 8.6.  9/12/2023: Hemoglobin decreased to 7.5.  S/p 1 unit PRBC transfusion.  Anemia work-up showed no evidence of iron vitamin B12 or folate deficiency.  9/13/2023: Hemoglobin improved to 9.9.  9/14/2023: Hemoglobin decreased to 8.8.  The drop was concerning for GI blood loss.  After the Whipple procedure, she had a drop in her hemoglobin to 7.9 on 11/4/2023 and to 7.4 on 11/10/2023.   She was transfused with PRBCs.  11/30/2023: Hemoglobin 9.1.  Ferritin 72. Transferrin saturation 6%.  Patient was previously on oral iron but she is no longer taking  it.  3/25/2024: Hemoglobin decreased to 8.8.  Iron stores decreased with ferritin at 30.6 and transferrin saturation at 4%.     *Malnutrition.  Patient had a jejunostomy tube that was placed during the Whipple Surgery.  She previously had low calorie intake.  11/30/2023: Weight was 124 pounds.  3/25/2024: Weight improved to 129 pounds.     *Left ovarian/adnexal cyst.  It was first identified on CT on 8/26/2023 measuring 5.7 cm.  Ultrasound on 8/30/2023 showed the lesion to have suspicious features.  There was endometrial thickening.  CT on 9/10/2023 showed the left adnexal lesion to be stable.  CT on 11/8/2023 revealed the left adnexal lesion to measure 5.2 cm.  This will be reevaluated on the follow-up scan.    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  I recommended a referral to the supportive oncology clinic.    PLAN:     1.  We will obtain CT scan of the chest abdomen pelvis within 1 week.  2.  We will refer to the supportive oncology clinic.  3.  Start oral iron once daily.  4.  Follow-up after the CT scans.  Will repeat CBC and CMP.        I spent 60 minutes caring for Lizzie on this date of service. This time includes time spent by me in the following activities: preparing for the visit, reviewing tests, obtaining and/or reviewing a separately obtained history, performing a medically appropriate examination and/or evaluation, counseling and educating the patient/family/caregiver, ordering medications, tests, or procedures, documenting information in the medical record, independently interpreting results and communicating that information with the patient/family/caregiver, and care coordination         Kate Mar MD  03/25/24

## 2024-03-26 ENCOUNTER — TELEPHONE (OUTPATIENT)
Dept: ONCOLOGY | Facility: CLINIC | Age: 79
End: 2024-03-26
Payer: MEDICARE

## 2024-03-26 NOTE — TELEPHONE ENCOUNTER
----- Message from Kate Mar MD sent at 3/25/2024  5:40 PM EDT -----  Please inform the patient and son that the tumor marker increased to 237.  Based on that, I recommend obtaining CT scan within 1 week and to see her in follow-up afterwards-2 units with CBC CMP.    Please refer to the supportive oncology clinic-RE: Anxiety due to cancer.    Thank you

## 2024-03-26 NOTE — TELEPHONE ENCOUNTER
Called patients son Ari and daughter in law Serina YEHUDA with date and time for follow up and scan appt. Advised Ari to call back.

## 2024-04-03 ENCOUNTER — HOSPITAL ENCOUNTER (OUTPATIENT)
Dept: CT IMAGING | Facility: HOSPITAL | Age: 79
Discharge: HOME | End: 2024-04-03
Admitting: INTERNAL MEDICINE
Payer: MEDICARE

## 2024-04-03 DIAGNOSIS — R97.8 ELEVATED TUMOR MARKERS: ICD-10-CM

## 2024-04-03 DIAGNOSIS — C25.0 MALIGNANT NEOPLASM OF HEAD OF PANCREAS: ICD-10-CM

## 2024-04-03 DIAGNOSIS — N94.89 ADNEXAL MASS: ICD-10-CM

## 2024-04-03 LAB — CREAT BLDA-MCNC: 0.6 MG/DL (ref 0.6–1.3)

## 2024-04-03 PROCEDURE — 71260 CT THORAX DX C+: CPT

## 2024-04-03 PROCEDURE — 25510000001 IOPAMIDOL 61 % SOLUTION: Performed by: INTERNAL MEDICINE

## 2024-04-03 PROCEDURE — 74177 CT ABD & PELVIS W/CONTRAST: CPT

## 2024-04-03 PROCEDURE — 82565 ASSAY OF CREATININE: CPT

## 2024-04-03 PROCEDURE — 0 DIATRIZOATE MEGLUMINE & SODIUM PER 1 ML: Performed by: INTERNAL MEDICINE

## 2024-04-03 RX ADMIN — DIATRIZOATE MEGLUMINE AND DIATRIZOATE SODIUM 30 ML: 660; 100 LIQUID ORAL; RECTAL at 13:15

## 2024-04-03 RX ADMIN — IOPAMIDOL 85 ML: 612 INJECTION, SOLUTION INTRAVENOUS at 13:15

## 2024-04-05 ENCOUNTER — OFFICE VISIT (OUTPATIENT)
Dept: ONCOLOGY | Facility: CLINIC | Age: 79
End: 2024-04-05
Payer: MEDICARE

## 2024-04-05 ENCOUNTER — LAB (OUTPATIENT)
Dept: LAB | Facility: HOSPITAL | Age: 79
End: 2024-04-05
Payer: MEDICARE

## 2024-04-05 VITALS
TEMPERATURE: 97.7 F | OXYGEN SATURATION: 96 % | RESPIRATION RATE: 16 BRPM | BODY MASS INDEX: 23.2 KG/M2 | WEIGHT: 130.9 LBS | SYSTOLIC BLOOD PRESSURE: 131 MMHG | HEIGHT: 63 IN | DIASTOLIC BLOOD PRESSURE: 85 MMHG | HEART RATE: 113 BPM

## 2024-04-05 DIAGNOSIS — D50.0 IRON DEFICIENCY ANEMIA DUE TO CHRONIC BLOOD LOSS: ICD-10-CM

## 2024-04-05 DIAGNOSIS — N94.89 ADNEXAL MASS: ICD-10-CM

## 2024-04-05 DIAGNOSIS — C78.02 MALIGNANT NEOPLASM METASTATIC TO BOTH LUNGS: ICD-10-CM

## 2024-04-05 DIAGNOSIS — F41.1 ANXIETY ASSOCIATED WITH CANCER DIAGNOSIS: ICD-10-CM

## 2024-04-05 DIAGNOSIS — C25.0 MALIGNANT NEOPLASM OF HEAD OF PANCREAS: Primary | ICD-10-CM

## 2024-04-05 DIAGNOSIS — C80.1 ANXIETY ASSOCIATED WITH CANCER DIAGNOSIS: ICD-10-CM

## 2024-04-05 DIAGNOSIS — C25.0 MALIGNANT NEOPLASM OF HEAD OF PANCREAS: ICD-10-CM

## 2024-04-05 DIAGNOSIS — C78.01 MALIGNANT NEOPLASM METASTATIC TO BOTH LUNGS: ICD-10-CM

## 2024-04-05 DIAGNOSIS — R97.8 ELEVATED TUMOR MARKERS: ICD-10-CM

## 2024-04-05 LAB
ALBUMIN SERPL-MCNC: 4.2 G/DL (ref 3.5–5.2)
ALBUMIN/GLOB SERPL: 1.2 G/DL
ALP SERPL-CCNC: 170 U/L (ref 39–117)
ALT SERPL W P-5'-P-CCNC: 21 U/L (ref 1–33)
ANION GAP SERPL CALCULATED.3IONS-SCNC: 12 MMOL/L (ref 5–15)
AST SERPL-CCNC: 27 U/L (ref 1–32)
BASOPHILS # BLD AUTO: 0.02 10*3/MM3 (ref 0–0.2)
BASOPHILS NFR BLD AUTO: 0.3 % (ref 0–1.5)
BILIRUB SERPL-MCNC: 0.3 MG/DL (ref 0–1.2)
BUN SERPL-MCNC: 16 MG/DL (ref 8–23)
BUN/CREAT SERPL: 25.8 (ref 7–25)
CALCIUM SPEC-SCNC: 9.4 MG/DL (ref 8.6–10.5)
CHLORIDE SERPL-SCNC: 101 MMOL/L (ref 98–107)
CO2 SERPL-SCNC: 23 MMOL/L (ref 22–29)
CREAT SERPL-MCNC: 0.62 MG/DL (ref 0.57–1)
DEPRECATED RDW RBC AUTO: 52.7 FL (ref 37–54)
EGFRCR SERPLBLD CKD-EPI 2021: 89.6 ML/MIN/1.73
EOSINOPHIL # BLD AUTO: 0.18 10*3/MM3 (ref 0–0.4)
EOSINOPHIL NFR BLD AUTO: 2.5 % (ref 0.3–6.2)
ERYTHROCYTE [DISTWIDTH] IN BLOOD BY AUTOMATED COUNT: 21.5 % (ref 12.3–15.4)
GLOBULIN UR ELPH-MCNC: 3.4 GM/DL
GLUCOSE SERPL-MCNC: 152 MG/DL (ref 65–99)
HCT VFR BLD AUTO: 34 % (ref 34–46.6)
HGB BLD-MCNC: 9.1 G/DL (ref 12–15.9)
IMM GRANULOCYTES # BLD AUTO: 0.05 10*3/MM3 (ref 0–0.05)
IMM GRANULOCYTES NFR BLD AUTO: 0.7 % (ref 0–0.5)
LYMPHOCYTES # BLD AUTO: 1.26 10*3/MM3 (ref 0.7–3.1)
LYMPHOCYTES NFR BLD AUTO: 17.2 % (ref 19.6–45.3)
MCH RBC QN AUTO: 19.1 PG (ref 26.6–33)
MCHC RBC AUTO-ENTMCNC: 26.8 G/DL (ref 31.5–35.7)
MCV RBC AUTO: 71.3 FL (ref 79–97)
MONOCYTES # BLD AUTO: 0.59 10*3/MM3 (ref 0.1–0.9)
MONOCYTES NFR BLD AUTO: 8 % (ref 5–12)
NEUTROPHILS NFR BLD AUTO: 5.23 10*3/MM3 (ref 1.7–7)
NEUTROPHILS NFR BLD AUTO: 71.3 % (ref 42.7–76)
NRBC BLD AUTO-RTO: 0 /100 WBC (ref 0–0.2)
PLATELET # BLD AUTO: 508 10*3/MM3 (ref 140–450)
PMV BLD AUTO: 9.3 FL (ref 6–12)
POTASSIUM SERPL-SCNC: 3.7 MMOL/L (ref 3.5–5.2)
PROT SERPL-MCNC: 7.6 G/DL (ref 6–8.5)
RBC # BLD AUTO: 4.77 10*6/MM3 (ref 3.77–5.28)
SODIUM SERPL-SCNC: 136 MMOL/L (ref 136–145)
WBC NRBC COR # BLD AUTO: 7.33 10*3/MM3 (ref 3.4–10.8)

## 2024-04-05 PROCEDURE — 80053 COMPREHEN METABOLIC PANEL: CPT

## 2024-04-05 PROCEDURE — 85025 COMPLETE CBC W/AUTO DIFF WBC: CPT

## 2024-04-05 PROCEDURE — 36415 COLL VENOUS BLD VENIPUNCTURE: CPT

## 2024-04-05 RX ORDER — ONDANSETRON 4 MG/1
4 TABLET, ORALLY DISINTEGRATING ORAL EVERY 8 HOURS PRN
COMMUNITY

## 2024-04-05 RX ORDER — DIVALPROEX SODIUM 500 MG/1
500 TABLET, EXTENDED RELEASE ORAL DAILY
COMMUNITY

## 2024-04-05 RX ORDER — MIRTAZAPINE 30 MG/1
30 TABLET, FILM COATED ORAL NIGHTLY
COMMUNITY

## 2024-04-05 RX ORDER — CARVEDILOL 3.12 MG/1
3.12 TABLET ORAL 2 TIMES DAILY WITH MEALS
COMMUNITY
Start: 2023-12-28

## 2024-04-05 RX ORDER — CLONAZEPAM 1 MG/1
1 TABLET ORAL 2 TIMES DAILY PRN
COMMUNITY
Start: 2023-12-28

## 2024-04-05 NOTE — PROGRESS NOTES
Subjective     CHIEF COMPLAINT:      Chief Complaint   Patient presents with    Follow-up     DISCUSS CT SCAN       HISTORY OF PRESENT ILLNESS:     Lizzie Bose is a 79 y.o. female patient who returns today for follow up on her pancreatic cancer. She returns today for follow up after undergoing CT scan of the chest abdomen and pelvis. She reports feeling anxious since her last visit. She is on Prestiq and Remeron but reports that she does not take them regularly. She was started on Ferrous Sulfate after her last visit. She reports that she has been taking the iron but she missed some doses. No abdominal pain. No constipation.     ROS:  Pertinent ROS is in the HPI.     Past medical, surgical, social and family history were reviewed.     MEDICATIONS:    Current Outpatient Medications:     carvedilol (COREG) 3.125 MG tablet, Take 1 tablet by mouth 2 (Two) Times a Day With Meals., Disp: , Rfl:     cetirizine (zyrTEC) 10 MG tablet, Take 1 tablet by mouth Daily., Disp: 30 tablet, Rfl: 0    cholecalciferol (VITAMIN D3) 25 MCG (1000 UT) tablet, Take 1 tablet by mouth Daily., Disp: , Rfl:     clonazePAM (KlonoPIN) 1 MG tablet, Take 1 tablet by mouth 2 (Two) Times a Day As Needed., Disp: , Rfl:     divalproex (Depakote ER) 500 MG 24 hr tablet, Take 1 tablet by mouth Daily., Disp: , Rfl:     ferrous sulfate 325 (65 FE) MG tablet, Take 1 tablet by mouth Daily., Disp: , Rfl:     hydrOXYzine (ATARAX) 25 MG tablet, Take 1 tablet by mouth 3 (Three) Times a Day As Needed for Itching., Disp: 30 tablet, Rfl: 0    lactulose (CHRONULAC) 10 GM/15ML solution, Take 15 mL by mouth 2 (Two) Times a Day As Needed (constipation)., Disp: 473 mL, Rfl: 0    mirtazapine (REMERON SOL-TAB) 30 MG disintegrating tablet, Place 1 tablet on the tongue Every Night., Disp: , Rfl:     ondansetron ODT (ZOFRAN-ODT) 4 MG disintegrating tablet, Place 1 tablet on the tongue Every 8 (Eight) Hours As Needed., Disp: , Rfl:     PRISTIQ 50 MG 24 hr tablet, Take 1  "tablet by mouth Daily., Disp: , Rfl:   No current facility-administered medications for this visit.    Facility-Administered Medications Ordered in Other Visits:     Chlorhexidine Gluconate 2 % pads 2 each, 2 pad , Apply externally, BID, Krish Ramirez MD    Objective     VITAL SIGNS:     Vitals:    04/05/24 1137   BP: 131/85   Pulse: 113   Resp: 16   Temp: 97.7 °F (36.5 °C)   TempSrc: Temporal   SpO2: 96%   Weight: 59.4 kg (130 lb 14.4 oz)   Height: 159 cm (62.6\")   PainSc: 0-No pain     Body mass index is 23.49 kg/m².     Wt Readings from Last 5 Encounters:   04/05/24 59.4 kg (130 lb 14.4 oz)   03/25/24 58.8 kg (129 lb 9.6 oz)   12/06/23 56.9 kg (125 lb 6.4 oz)   11/30/23 56.4 kg (124 lb 6.4 oz)   09/10/23 56.9 kg (125 lb 8 oz)     PHYSICAL EXAMINATION:   GENERAL: The patient appears in fair general condition, not in acute distress.   SKIN: No ecchymosis.  EYES: No jaundice. Pallor.  CHEST: Normal respiratory effort.   CVS: No edema.  ABDOMEN: Non-distended.  EXTREMITIES: No joint deformity.   PSYCH: Anxious.     DIAGNOSTIC DATA:     Results from last 7 days   Lab Units 04/05/24  1111   WBC 10*3/mm3 7.33   NEUTROS ABS 10*3/mm3 5.23   HEMOGLOBIN g/dL 9.1*   HEMATOCRIT % 34.0   PLATELETS 10*3/mm3 508*     Results from last 7 days   Lab Units 04/05/24  1111 04/03/24  1302   SODIUM mmol/L 136  --    POTASSIUM mmol/L 3.7  --    CHLORIDE mmol/L 101  --    CO2 mmol/L 23.0  --    BUN mg/dL 16  --    CREATININE mg/dL 0.62 0.60   CALCIUM mg/dL 9.4  --    ALBUMIN g/dL 4.2  --    BILIRUBIN mg/dL 0.3  --    ALK PHOS U/L 170*  --    ALT (SGPT) U/L 21  --    AST (SGOT) U/L 27  --    GLUCOSE mg/dL 152*  --      Component      Latest Ref Rng 11/30/2023 3/25/2024   CA 19-9      <=35.0 U/mL 98.0 (H)  237.0 (H)       CT chest abdomen pelvis on 4/3/2024:  CT CHEST:  Multiple new pulmonary nodules in the right lower lobe measuring up to 8  mm in the base of the right lower lobe (axial series 3 image 74). New 8  mm mixed solid and " groundglass nodule in the lateral base of the left  upper lobe (axial series 3 image 47). Multiple new pulmonary nodules in  the left lower lobe measuring 6 mm in average diameter (axial series 3  images 57 and 58). Multiple additional sub-4 mm pulmonary nodules in  both lungs. Mild chronic peripheral interstitial thickening in both  lungs. Mild multifocal bibasilar subsegmental atelectasis and/or  scarring. The central airways are widely patent. No pneumothorax or  pleural effusion.     Heart and great vessels of the chest are normal in size. Extensive  calcified coronary artery disease. Dense aortic valve and mitral annulus  calcifications. Trace pericardial effusion. Mild calcified  atherosclerotic disease in the thoracic aorta. No pathologically  enlarged intrathoracic lymph nodes are identified. Small hiatal hernia.     Similar-appearing multilevel thoracic and lower cervical spondylosis.  Partially imaged chronic changes in both shoulders. No acute osseous  abnormality or concerning osseous lesion.     CT ABDOMEN AND PELVIS:  Postoperative changes from Whipple procedure. The liver and spleen are  unremarkable. The remaining pancreatic body and tail are unremarkable.  The adrenal glands and kidneys are unremarkable. Mild diffuse urinary  bladder wall thickening, which could be accentuated by under distention.  Stable 5.5 cm cystic lesion in the posterior left hemipelvis, likely  adnexal. The uterus and right adnexa are unremarkable in CT appearance.     Mild nonspecific diffuse gastric wall thickening. Mild colorectal stool.  Colonic diverticula, without CT evidence of diverticulitis. No bowel  obstruction or significant bowel wall thickening. Status post  appendectomy.     No free fluid in the abdomen or pelvis. No free intraperitoneal air.  Enlarged periportal/alondra hepatic lymph node in the right upper  quadrant, measuring 2 cm x 1.5 cm (axial series 2 image 97). Mild  calcified and noncalcified  atherosclerotic disease in the abdominal  aorta and its distal branches without aneurysm.     Similar-appearing multilevel lumbar spondylosis. Partially imaged left  DIONICIO with the included hardware in its expected position and without  evidence of hardware complications. No acute osseous abnormality or  concerning osseous lesion.     IMPRESSION:     1. Postoperative changes from Whipple procedure with an enlarged  periportal/alondra hepatic lymph node in the right upper quadrant  concerning for metastatic disease. Could consider further evaluation  with PET/CT if clinically indicated.  2. Multiple new subcentimeter pulmonary nodules in both lungs,  concerning for pulmonary metastatic disease.  3. Mild diffuse urinary bladder wall thickening, which could be  accentuated by underdistention but could reflect a nonspecific cystitis.  Consider correlating with urinalysis.  4. Stable left adnexal cyst.    Assessment & Plan    *Pancreatic adenocarcinoma arising from the pancreatic head.  Patient present with biliary obstruction with severe intra and extrahepatic biliary ductal dilatation.  The common bile duct was abruptly decompressed.  MRI abdomen on 8/27/2023 revealed a hypoenhancing pancreatic mass within the uncinate process and pancreatic head resulting in obstruction of the common bile duct and severe upstream intra and extrahepatic biliary ductal dilatation.  S/p ERCP with biliary duct stent placement on 8/29/2023.  Cytology from 8/29/2023 revealed scattered atypical ductal/glandular cells suspicious for well-differentiated neoplasm.  This was considered nondiagnostic of neoplasm.  CT on 9/10/2023 revealed interval placement of the biliary stent with resolution of the biliary duct dilatation. No pancreatic mass was identified.  Patient has cholecystostomy tube placed on 9/12/2023.  Patient underwent Whipple procedure on 10/31/2023.  Pathology exam revealed the tumor to measure 2.1 cm.  Invasive carcinoma was present  at the margin.  There was perineural invasion.  There was involvement of 6 out of 24 lymph nodes examined.  It was pT2 N2, stage III.  She was considered to be at increased risk for local and systemic recurrence.   With the positive resection margin, she is at increased risk for local recurrence.  Due to her age and decreased performance status, adjuvant chemotherapy was not recommended.  11/30/2023: CA 19-9 was 98.0.  Liquid biopsy at [facility] revealed no actionable mutation.  Patient enrolled with hospice in late 2023.  3/25/2024: CA 19-9 increased to 237.  CT scan on 4/3/2024 revealed a suspicious alondra hepatis LN that increased to 2 x 1.5 cm in size.   There were new bilateral lung lesions, up to 8 mm in size, suspicious for metastasis.   With the increase in the CA 19-9 level, the findings are indicative of development of metastatic disease (stage IV).  I explained the findings to the patient and her family.  The patient asked if the lesions can be removed surgically. I explained that this is not feasible due to the extent of disease. She will continue to be at risk of developing new metastatic lesions in the future.    Due to her age and decreased performance status, she is not a good candidate for FOLFIRINOX or Gemzar Abraxane combination.  I explained that single agent Gemzar is associated with a low probability of response (10-20%) with the benefit being limited to improving overall survival by about 1 month.  I explained that chemotherapy treatment is more likely to result in harm than benefit.   I recommended continuing with the current Hospice care.  Based on the CT findings, she may start developing symptoms from the metastasis in about 2-3 months.      *Iron deficiency anemia  9/9/2023: Hemoglobin 11.9.    9/14/2023: Hemoglobin decreased to 8.8.  The drop was concerning for GI blood loss.  After the Whipple procedure, she had a drop in her hemoglobin to 7.9 on 11/4/2023 and to 7.4 on 11/10/2023.   She  was transfused with PRBCs.  11/30/2023: Hemoglobin 9.1.  Ferritin 72. Transferrin saturation 6%.  Patient was previously on oral iron but she is no longer taking it.  3/25/2024: Hemoglobin decreased to 8.8.  Iron stores decreased with ferritin at 30.6 and transferrin saturation at 4%.  She was started on ferrous sulfate 325 mg once daily.  She tolerated it without upset stomach or constipation.   4/5/2024: Hemoglobin improved to 9.1.     *Malnutrition.  She previously had a jejunostomy tube that was placed during the Whipple Surgery.  She previously had low calorie intake.  11/30/2023: Weight was 124 pounds.  3/25/2024: Weight improved to 129 pounds.  4/5/2024: Weight slightly improved to 130 pounds.     *Left ovarian/adnexal cyst.  It was first identified on CT on 8/26/2023 measuring 5.7 cm.  Ultrasound on 8/30/2023 showed the lesion to have suspicious features.  There was endometrial thickening.  CT on 9/10/2023 showed the left adnexal lesion to be stable.  CT on 11/8/2023 revealed the left adnexal lesion to measure 5.2 cm.  CT on 4/3/2024 revealed stable left adnexal cyst.     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  Patient has depression and she is on Remeron and Pristiq.  However, she indicated that she was not taking the medications regularly.     PLAN:     1.  Continue Hospice care.  2.  Refer to Supportive Oncology.  3.  Continue ferrous sulfate 325 mg once daily.  4.  I did not schedule a follow-up visit.      Discussed with the patient's son over the phone.    I spent 60 minutes caring for Lizzie on this date of service. This time includes time spent by me in the following activities: preparing for the visit, reviewing tests, obtaining and/or reviewing a separately obtained history, counseling and educating the patient/family/caregiver, documenting information in the medical record, and independently interpreting results and communicating that information with the patient/family/caregiver          Kate  ALE Mar MD  04/05/24

## (undated) DEVICE — ERBE NESSY®PLATE 170 SPLIT; 168CM²; CABLE 3M: Brand: ERBE

## (undated) DEVICE — WIREGUIDED CYTOLOGY BRUSH: Brand: RX CYTOLOGY BRUSH

## (undated) DEVICE — MAT FLR ABSORBENT LG 4FT 10 2.5FT

## (undated) DEVICE — PREP SOL POVIDONE/IODINE BT 4OZ

## (undated) DEVICE — GLV SURG SENSICARE PI PF LF 7 GRN STRL

## (undated) DEVICE — BNDG ELAS ELITE V/CLOSE 6IN 5YD LF STRL

## (undated) DEVICE — SENSR O2 OXIMAX FNGR A/ 18IN NONSTR

## (undated) DEVICE — SINGLE USE DISTAL COVER MAJ-2315: Brand: SINGLE USE DISTAL COVER

## (undated) DEVICE — STPLR SKIN VISISTAT WD 35CT

## (undated) DEVICE — TUBING, SUCTION, 1/4" X 10', STRAIGHT: Brand: MEDLINE

## (undated) DEVICE — GLV SURG SENSICARE W/ALOE PF LF 7.5 STRL

## (undated) DEVICE — MEDI-VAC YANKAUER SUCTION HANDLE W/BULBOUS TIP: Brand: CARDINAL HEALTH

## (undated) DEVICE — Device

## (undated) DEVICE — APPL DURAPREP IODOPHOR APL 26ML

## (undated) DEVICE — GLV SURG PREMIERPRO ORTHO LTX PF SZ7.5 BRN

## (undated) DEVICE — ADHS SKIN DERMABOND TOP ADVANCED

## (undated) DEVICE — SPHINCTEROTOME: Brand: HYDRATOME RX 44

## (undated) DEVICE — DUAL CUT SAGITTAL BLADE

## (undated) DEVICE — NEEDLE, QUINCKE 22GX3.5": Brand: MEDLINE INDUSTRIES, INC.

## (undated) DEVICE — PREMIUM WET SKIN PREP TRAY: Brand: MEDLINE INDUSTRIES, INC.

## (undated) DEVICE — CANN O2 ETCO2 FITS ALL CONN CO2 SMPL A/ 7IN DISP LF

## (undated) DEVICE — GLV SURG SENSICARE W/ALOE PF LF 8 STRL

## (undated) DEVICE — 3M™ IOBAN™ 2 ANTIMICROBIAL INCISE DRAPE 6640EZ: Brand: IOBAN™ 2

## (undated) DEVICE — GLV SURG SENSICARE PI MIC PF SZ7 LF STRL

## (undated) DEVICE — SUT VIC 1 CT1 36IN J947H

## (undated) DEVICE — KT DRN EVAC WND PVC PCH WTROC RND 10F400

## (undated) DEVICE — LN SMPL CO2 SHTRM SD STREAM W/M LUER

## (undated) DEVICE — PK KN TOTL 40

## (undated) DEVICE — PENCL E/S ULTRAVAC TELESCP NOSE HOLSTR 10FT

## (undated) DEVICE — SUT VIC 0 CT1 36IN J946H

## (undated) DEVICE — KT ORCA ORCAPOD DISP STRL

## (undated) DEVICE — SOL NACL 0.9PCT 100ML SGL

## (undated) DEVICE — BITEBLOCK OMNI BLOC

## (undated) DEVICE — ADAPT CLN BIOGUARD AIR/H2O DISP

## (undated) DEVICE — UNDERCAST PADDING: Brand: DEROYAL

## (undated) DEVICE — STERILE PATIENT PROTECTIVE PAD FOR IMP® KNEE POSITIONERS & COHESIVE WRAP (10 / CASE): Brand: DE MAYO KNEE POSITIONER®

## (undated) DEVICE — TRAP FLD MINIVAC MEGADYNE 100ML

## (undated) DEVICE — DEV LK WIREGUIDE FUSN OLYMP SCP